# Patient Record
Sex: FEMALE | Race: WHITE | Employment: UNEMPLOYED | URBAN - NONMETROPOLITAN AREA
[De-identification: names, ages, dates, MRNs, and addresses within clinical notes are randomized per-mention and may not be internally consistent; named-entity substitution may affect disease eponyms.]

---

## 2017-05-19 ENCOUNTER — HOSPITAL ENCOUNTER (EMERGENCY)
Age: 42
Discharge: HOME | End: 2017-05-19
Attending: EMERGENCY MEDICINE
Payer: COMMERCIAL

## 2017-05-19 VITALS
RESPIRATION RATE: 20 BRPM | DIASTOLIC BLOOD PRESSURE: 61 MMHG | SYSTOLIC BLOOD PRESSURE: 155 MMHG | OXYGEN SATURATION: 96 % | HEART RATE: 86 BPM | TEMPERATURE: 98.1 F

## 2017-05-19 DIAGNOSIS — J45.901 ASTHMA ATTACK: Primary | ICD-10-CM

## 2017-05-19 PROCEDURE — 6370000000 HC RX 637 (ALT 250 FOR IP)

## 2017-05-19 PROCEDURE — 99284 EMERGENCY DEPT VISIT MOD MDM: CPT

## 2017-05-19 PROCEDURE — 6360000002 HC RX W HCPCS: Performed by: EMERGENCY MEDICINE

## 2017-05-19 PROCEDURE — 94664 DEMO&/EVAL PT USE INHALER: CPT

## 2017-05-19 RX ORDER — ALBUTEROL SULFATE 2.5 MG/3ML
2.5 SOLUTION RESPIRATORY (INHALATION) ONCE
Status: COMPLETED | OUTPATIENT
Start: 2017-05-19 | End: 2017-05-19

## 2017-05-19 RX ORDER — ALBUTEROL SULFATE 90 UG/1
2 AEROSOL, METERED RESPIRATORY (INHALATION) EVERY 6 HOURS PRN
Qty: 1 INHALER | Refills: 0 | Status: SHIPPED | OUTPATIENT
Start: 2017-05-19

## 2017-05-19 RX ORDER — ALBUTEROL SULFATE 90 UG/1
2 AEROSOL, METERED RESPIRATORY (INHALATION) ONCE
Status: COMPLETED | OUTPATIENT
Start: 2017-05-19 | End: 2017-05-19

## 2017-05-19 RX ORDER — ALBUTEROL SULFATE 90 UG/1
2 AEROSOL, METERED RESPIRATORY (INHALATION) EVERY 6 HOURS PRN
COMMUNITY
End: 2017-05-19 | Stop reason: HOSPADM

## 2017-05-19 RX ORDER — VALACYCLOVIR HYDROCHLORIDE 500 MG/1
500 TABLET, FILM COATED ORAL DAILY
COMMUNITY

## 2017-05-19 RX ORDER — INSULIN GLARGINE 100 [IU]/ML
40 INJECTION, SOLUTION SUBCUTANEOUS NIGHTLY
COMMUNITY
End: 2018-09-11 | Stop reason: ALTCHOICE

## 2017-05-19 RX ORDER — ALBUTEROL SULFATE 90 UG/1
AEROSOL, METERED RESPIRATORY (INHALATION)
Status: COMPLETED
Start: 2017-05-19 | End: 2017-05-19

## 2017-05-19 RX ADMIN — ALBUTEROL SULFATE 2 PUFF: 90 AEROSOL, METERED RESPIRATORY (INHALATION) at 23:14

## 2017-05-19 RX ADMIN — ALBUTEROL SULFATE 2.5 MG: 2.5 SOLUTION RESPIRATORY (INHALATION) at 22:27

## 2017-05-19 ASSESSMENT — ENCOUNTER SYMPTOMS
ABDOMINAL PAIN: 0
COUGH: 0
CHEST TIGHTNESS: 0
APNEA: 0
SWOLLEN GLANDS: 0
SPUTUM PRODUCTION: 0
HEMOPTYSIS: 0
SHORTNESS OF BREATH: 1
EYES NEGATIVE: 1
VOMITING: 0
SORE THROAT: 0
ALLERGIC/IMMUNOLOGIC NEGATIVE: 1
GASTROINTESTINAL NEGATIVE: 1
CHOKING: 0
WHEEZING: 1

## 2017-06-02 ENCOUNTER — HOSPITAL ENCOUNTER (EMERGENCY)
Age: 42
Discharge: HOME | End: 2017-06-02
Attending: EMERGENCY MEDICINE
Payer: COMMERCIAL

## 2017-06-02 VITALS
TEMPERATURE: 97.6 F | SYSTOLIC BLOOD PRESSURE: 158 MMHG | RESPIRATION RATE: 20 BRPM | OXYGEN SATURATION: 95 % | HEART RATE: 95 BPM | DIASTOLIC BLOOD PRESSURE: 92 MMHG

## 2017-06-02 DIAGNOSIS — T17.1XXA NASAL FOREIGN BODY, INITIAL ENCOUNTER: ICD-10-CM

## 2017-06-02 DIAGNOSIS — L73.9 FOLLICULITIS: Primary | ICD-10-CM

## 2017-06-02 PROCEDURE — 99283 EMERGENCY DEPT VISIT LOW MDM: CPT

## 2017-06-02 RX ORDER — SULFAMETHOXAZOLE AND TRIMETHOPRIM 800; 160 MG/1; MG/1
1 TABLET ORAL 2 TIMES DAILY
Qty: 14 TABLET | Refills: 0 | Status: SHIPPED | OUTPATIENT
Start: 2017-06-02 | End: 2017-06-12

## 2017-06-02 RX ORDER — ALBUTEROL SULFATE 90 UG/1
2 AEROSOL, METERED RESPIRATORY (INHALATION) EVERY 6 HOURS PRN
Status: DISCONTINUED | OUTPATIENT
Start: 2017-06-02 | End: 2017-06-03 | Stop reason: HOSPADM

## 2018-09-11 ENCOUNTER — APPOINTMENT (OUTPATIENT)
Dept: CT IMAGING | Age: 43
End: 2018-09-11
Payer: COMMERCIAL

## 2018-09-11 ENCOUNTER — HOSPITAL ENCOUNTER (EMERGENCY)
Age: 43
Discharge: HOME | End: 2018-09-11
Attending: EMERGENCY MEDICINE
Payer: COMMERCIAL

## 2018-09-11 VITALS
DIASTOLIC BLOOD PRESSURE: 78 MMHG | HEART RATE: 70 BPM | WEIGHT: 260 LBS | OXYGEN SATURATION: 97 % | TEMPERATURE: 97.8 F | SYSTOLIC BLOOD PRESSURE: 172 MMHG | BODY MASS INDEX: 41.97 KG/M2 | RESPIRATION RATE: 16 BRPM

## 2018-09-11 DIAGNOSIS — N39.0 URINARY TRACT INFECTION WITHOUT HEMATURIA, SITE UNSPECIFIED: ICD-10-CM

## 2018-09-11 DIAGNOSIS — R10.9 ABDOMINAL PAIN, UNSPECIFIED ABDOMINAL LOCATION: Primary | ICD-10-CM

## 2018-09-11 LAB
-: NORMAL
ABSOLUTE EOS #: 0.3 K/UL (ref 0–0.4)
ABSOLUTE IMMATURE GRANULOCYTE: ABNORMAL K/UL (ref 0–0.3)
ABSOLUTE LYMPH #: 3.8 K/UL (ref 1–4.8)
ABSOLUTE MONO #: 0.8 K/UL (ref 0–1)
ALBUMIN SERPL-MCNC: 3.7 G/DL (ref 3.5–5.2)
ALBUMIN/GLOBULIN RATIO: ABNORMAL (ref 1–2.5)
ALP BLD-CCNC: 64 U/L (ref 35–104)
ALT SERPL-CCNC: 22 U/L (ref 5–33)
AMORPHOUS: NORMAL
ANION GAP SERPL CALCULATED.3IONS-SCNC: 16 MMOL/L (ref 9–17)
AST SERPL-CCNC: 13 U/L
BACTERIA: NORMAL
BASOPHILS # BLD: 1 % (ref 0–2)
BASOPHILS ABSOLUTE: 0.1 K/UL (ref 0–0.2)
BILIRUB SERPL-MCNC: 0.2 MG/DL (ref 0.3–1.2)
BILIRUBIN URINE: ABNORMAL
BUN BLDV-MCNC: 13 MG/DL (ref 6–20)
BUN/CREAT BLD: 23 (ref 9–20)
CALCIUM SERPL-MCNC: 9 MG/DL (ref 8.6–10.4)
CASTS UA: NORMAL /LPF
CHLORIDE BLD-SCNC: 99 MMOL/L (ref 98–107)
CO2: 20 MMOL/L (ref 20–31)
COLOR: YELLOW
COMMENT UA: ABNORMAL
CREAT SERPL-MCNC: 0.56 MG/DL (ref 0.5–0.9)
CRYSTALS, UA: NORMAL /HPF
DIFFERENTIAL TYPE: YES
EKG ATRIAL RATE: 74 BPM
EKG P AXIS: 59 DEGREES
EKG P-R INTERVAL: 144 MS
EKG Q-T INTERVAL: 394 MS
EKG QRS DURATION: 84 MS
EKG QTC CALCULATION (BAZETT): 437 MS
EKG R AXIS: 74 DEGREES
EKG T AXIS: 49 DEGREES
EKG VENTRICULAR RATE: 74 BPM
EOSINOPHILS RELATIVE PERCENT: 2 % (ref 0–5)
EPITHELIAL CELLS UA: NORMAL /HPF
GFR AFRICAN AMERICAN: >60 ML/MIN
GFR NON-AFRICAN AMERICAN: >60 ML/MIN
GFR SERPL CREATININE-BSD FRML MDRD: ABNORMAL ML/MIN/{1.73_M2}
GFR SERPL CREATININE-BSD FRML MDRD: ABNORMAL ML/MIN/{1.73_M2}
GLUCOSE BLD-MCNC: 227 MG/DL (ref 70–99)
GLUCOSE URINE: ABNORMAL
HCT VFR BLD CALC: 53.2 % (ref 36–46)
HEMOGLOBIN: 18 G/DL (ref 12–16)
IMMATURE GRANULOCYTES: ABNORMAL %
KETONES, URINE: ABNORMAL
LACTIC ACID, WHOLE BLOOD: ABNORMAL MMOL/L (ref 0.7–2.1)
LACTIC ACID: 2.7 MMOL/L (ref 0.5–2.2)
LEUKOCYTE ESTERASE, URINE: ABNORMAL
LIPASE: 56 U/L (ref 13–60)
LYMPHOCYTES # BLD: 25 % (ref 15–40)
MCH RBC QN AUTO: 32.6 PG (ref 26–34)
MCHC RBC AUTO-ENTMCNC: 33.8 G/DL (ref 31–37)
MCV RBC AUTO: 96.5 FL (ref 80–100)
MONOCYTES # BLD: 5 % (ref 4–8)
MUCUS: NORMAL
NITRITE, URINE: NEGATIVE
NRBC AUTOMATED: ABNORMAL PER 100 WBC
OTHER OBSERVATIONS UA: NORMAL
PDW BLD-RTO: 13.9 % (ref 12.1–15.2)
PH UA: 5 (ref 5–8)
PLATELET # BLD: 222 K/UL (ref 140–450)
PLATELET ESTIMATE: ABNORMAL
PMV BLD AUTO: ABNORMAL FL (ref 6–12)
POTASSIUM SERPL-SCNC: 4 MMOL/L (ref 3.7–5.3)
PROTEIN UA: ABNORMAL
RBC # BLD: 5.51 M/UL (ref 4–5.2)
RBC # BLD: ABNORMAL 10*6/UL
RBC UA: NORMAL /HPF (ref 0–2)
RENAL EPITHELIAL, UA: NORMAL /HPF
SEG NEUTROPHILS: 67 % (ref 47–75)
SEGMENTED NEUTROPHILS ABSOLUTE COUNT: 10 K/UL (ref 2.5–7)
SODIUM BLD-SCNC: 135 MMOL/L (ref 135–144)
SPECIFIC GRAVITY UA: 1.02 (ref 1–1.03)
TOTAL PROTEIN: 7 G/DL (ref 6.4–8.3)
TRICHOMONAS: NORMAL
TROPONIN INTERP: NORMAL
TROPONIN T: <0.03 NG/ML
TURBIDITY: CLEAR
URINE HGB: ABNORMAL
UROBILINOGEN, URINE: ABNORMAL
WBC # BLD: 15 K/UL (ref 3.5–11)
WBC # BLD: ABNORMAL 10*3/UL
WBC UA: NORMAL /HPF
YEAST: NORMAL

## 2018-09-11 PROCEDURE — 99284 EMERGENCY DEPT VISIT MOD MDM: CPT

## 2018-09-11 PROCEDURE — 83690 ASSAY OF LIPASE: CPT

## 2018-09-11 PROCEDURE — 6360000004 HC RX CONTRAST MEDICATION: Performed by: EMERGENCY MEDICINE

## 2018-09-11 PROCEDURE — 81001 URINALYSIS AUTO W/SCOPE: CPT

## 2018-09-11 PROCEDURE — 6360000002 HC RX W HCPCS: Performed by: EMERGENCY MEDICINE

## 2018-09-11 PROCEDURE — 83605 ASSAY OF LACTIC ACID: CPT

## 2018-09-11 PROCEDURE — 87086 URINE CULTURE/COLONY COUNT: CPT

## 2018-09-11 PROCEDURE — 93005 ELECTROCARDIOGRAM TRACING: CPT

## 2018-09-11 PROCEDURE — 6370000000 HC RX 637 (ALT 250 FOR IP): Performed by: EMERGENCY MEDICINE

## 2018-09-11 PROCEDURE — 85025 COMPLETE CBC W/AUTO DIFF WBC: CPT

## 2018-09-11 PROCEDURE — 96372 THER/PROPH/DIAG INJ SC/IM: CPT

## 2018-09-11 PROCEDURE — 80053 COMPREHEN METABOLIC PANEL: CPT

## 2018-09-11 PROCEDURE — 74177 CT ABD & PELVIS W/CONTRAST: CPT

## 2018-09-11 PROCEDURE — 84484 ASSAY OF TROPONIN QUANT: CPT

## 2018-09-11 PROCEDURE — 36415 COLL VENOUS BLD VENIPUNCTURE: CPT

## 2018-09-11 RX ORDER — FLUCONAZOLE 100 MG/1
100 TABLET ORAL DAILY
Qty: 5 TABLET | Refills: 0 | Status: SHIPPED | OUTPATIENT
Start: 2018-09-11 | End: 2018-09-16

## 2018-09-11 RX ORDER — NITROFURANTOIN 25; 75 MG/1; MG/1
100 CAPSULE ORAL ONCE
Status: COMPLETED | OUTPATIENT
Start: 2018-09-11 | End: 2018-09-11

## 2018-09-11 RX ORDER — NALBUPHINE HCL 10 MG/ML
20 AMPUL (ML) INJECTION ONCE
Status: COMPLETED | OUTPATIENT
Start: 2018-09-11 | End: 2018-09-11

## 2018-09-11 RX ORDER — NITROFURANTOIN 25; 75 MG/1; MG/1
100 CAPSULE ORAL 2 TIMES DAILY
Qty: 14 CAPSULE | Refills: 0 | Status: SHIPPED | OUTPATIENT
Start: 2018-09-11 | End: 2018-09-18

## 2018-09-11 RX ADMIN — NALBUPHINE HYDROCHLORIDE 20 MG: 10 INJECTION, SOLUTION INTRAMUSCULAR; INTRAVENOUS; SUBCUTANEOUS at 21:16

## 2018-09-11 RX ADMIN — NITROFURANTOIN MONOHYDRATE/MACROCRYSTALLINE 100 MG: 25; 75 CAPSULE ORAL at 23:40

## 2018-09-11 RX ADMIN — IOPAMIDOL 75 ML: 755 INJECTION, SOLUTION INTRAVENOUS at 21:40

## 2018-09-11 ASSESSMENT — PAIN SCALES - GENERAL
PAINLEVEL_OUTOF10: 7
PAINLEVEL_OUTOF10: 7

## 2018-09-11 ASSESSMENT — PAIN DESCRIPTION - FREQUENCY: FREQUENCY: CONTINUOUS

## 2018-09-11 ASSESSMENT — PAIN DESCRIPTION - ONSET: ONSET: ON-GOING

## 2018-09-11 ASSESSMENT — PAIN DESCRIPTION - PAIN TYPE: TYPE: ACUTE PAIN

## 2018-09-11 ASSESSMENT — PAIN DESCRIPTION - DESCRIPTORS: DESCRIPTORS: ACHING

## 2018-09-11 ASSESSMENT — PAIN DESCRIPTION - ORIENTATION: ORIENTATION: RIGHT;LEFT

## 2018-09-11 ASSESSMENT — PAIN DESCRIPTION - LOCATION: LOCATION: ABDOMEN

## 2018-09-12 LAB
CULTURE: NORMAL
Lab: NORMAL
SPECIMEN DESCRIPTION: NORMAL
STATUS: NORMAL

## 2018-09-12 NOTE — ED PROVIDER NOTES
eMERGENCY dEPARTMENT eNCOUnter      [facility]    Chief Complaint   Patient presents with    Flank Pain     Pt states this all started today     Urinary Tract Infection       HPI    Stlela Mitchell is a 43 y.o. female who presents to ED from home. By car. With complaint of Epigastric abdominal pain that radiates to the back. Onset after lunch today. Intensity of symptoms mother and  Location of symptoms epigastric pain with radiation to her back. Patient complains of nausea no vomiting. No diarrhea  Patient denies alcohol use. Patient has periods history of  cholecystectomy.     REVIEW OF SYSTEMS    All systems reviewed and positives are in the HPI    PAST MEDICAL HISTORY    Past Medical History:   Diagnosis Date    Acid reflux     Asthma     Bipolar 1 disorder ([facility] [address]     Brunner's gland hyperplasia of duodenum     Depression     Diabetes mellitus ([facility] [address]     States \"diabetes is controlled\"    DUB (dysfunctional uterine bleeding)     Gastritis and duodenitis     Headache(784.0)     Hiatal hernia     HIGH CHOLESTEROL     Hypertension     MRSA (methicillin resistant staph aureus) culture positive     Neuropathy     Neuropathy     from DM bilat UE and LE     Obesity     Polycystic ovarian disease     Restless legs syndrome     Seizures (HCC)     Last seizure june 2014    Sleep apnea     Unspecified sleep apnea        SURGICAL HISTORY    Past Surgical History:   Procedure Laterality Date    CHOLECYSTECTOMY      COLONOSCOPY  07-    DILATION AND CURETTAGE OF UTERUS      TONSILLECTOMY AND ADENOIDECTOMY      UPPER GASTROINTESTINAL ENDOSCOPY  07-    UPPER GASTROINTESTINAL ENDOSCOPY  09-11-13       CURRENT MEDICATIONS    Current Outpatient Rx   Medication Sig Dispense Refill    nitrofurantoin, macrocrystal-monohydrate, (MACROBID) 100 MG capsule Take 1 capsule by mouth 2 times daily for 7 days 14 capsule 0    fluconazole (DIFLUCAN) 100 MG tablet Take 1 tablet by mouth this visit:    Medications   nalbuphine (NUBAIN) injection 20 mg (20 mg Intramuscular Given 9/11/18 2116)   iopamidol (ISOVUE-370) 76 % injection 75 mL (75 mLs Intravenous Given 9/11/18 2140)   nitrofurantoin (macrocrystal-monohydrate) (MACROBID) capsule 100 mg (100 mg Oral Given 9/11/18 2340)       New Prescriptions from this visit:    Discharge Medication List as of 9/11/2018 11:36 PM      START taking these medications    Details   nitrofurantoin, macrocrystal-monohydrate, (MACROBID) 100 MG capsule Take 1 capsule by mouth 2 times daily for 7 days, Disp-14 capsule, R-0Print             Follow-up:  [facility] ED  [address] [address] [phone]    As needed, If symptoms worsen        Final Impression:   1. Abdominal pain, unspecified abdominal location    2.  Urinary tract infection without hematuria, site unspecified               (Please note that portions of this note were completed with a voice recognition program.  Efforts were made to edit the dictations but occasionally words are mis-transcribed.)      (Please note that portions of this note were completed with a voice recognition program.  Efforts were made to edit the dictations but occasionally words are mis-transcribed.)      Demetrice Bearden MD  09/12/18 0281

## 2018-10-20 ENCOUNTER — APPOINTMENT (OUTPATIENT)
Dept: GENERAL RADIOLOGY | Age: 43
End: 2018-10-20
Payer: COMMERCIAL

## 2018-10-20 ENCOUNTER — HOSPITAL ENCOUNTER (EMERGENCY)
Age: 43
Discharge: HOME | End: 2018-10-20
Attending: EMERGENCY MEDICINE
Payer: COMMERCIAL

## 2018-10-20 VITALS
OXYGEN SATURATION: 93 % | RESPIRATION RATE: 18 BRPM | HEIGHT: 65 IN | SYSTOLIC BLOOD PRESSURE: 111 MMHG | DIASTOLIC BLOOD PRESSURE: 62 MMHG | WEIGHT: 274 LBS | TEMPERATURE: 99 F | HEART RATE: 82 BPM | BODY MASS INDEX: 45.65 KG/M2

## 2018-10-20 DIAGNOSIS — S93.401A SPRAIN OF RIGHT ANKLE, UNSPECIFIED LIGAMENT, INITIAL ENCOUNTER: Primary | ICD-10-CM

## 2018-10-20 PROCEDURE — 6370000000 HC RX 637 (ALT 250 FOR IP): Performed by: EMERGENCY MEDICINE

## 2018-10-20 PROCEDURE — 99283 EMERGENCY DEPT VISIT LOW MDM: CPT

## 2018-10-20 PROCEDURE — 73590 X-RAY EXAM OF LOWER LEG: CPT

## 2018-10-20 PROCEDURE — 73610 X-RAY EXAM OF ANKLE: CPT

## 2018-10-20 RX ORDER — IBUPROFEN 200 MG
800 TABLET ORAL ONCE
Status: COMPLETED | OUTPATIENT
Start: 2018-10-20 | End: 2018-10-20

## 2018-10-20 RX ADMIN — IBUPROFEN 800 MG: 200 TABLET, FILM COATED ORAL at 21:52

## 2018-10-20 ASSESSMENT — PAIN DESCRIPTION - ORIENTATION: ORIENTATION: RIGHT

## 2018-10-20 ASSESSMENT — PAIN DESCRIPTION - LOCATION: LOCATION: ANKLE;FOOT

## 2018-10-20 ASSESSMENT — PAIN DESCRIPTION - PAIN TYPE: TYPE: ACUTE PAIN

## 2018-10-20 ASSESSMENT — PAIN SCALES - GENERAL
PAINLEVEL_OUTOF10: 5
PAINLEVEL_OUTOF10: 2
PAINLEVEL_OUTOF10: 5

## 2023-04-04 LAB
CALCIDIOL (25 OH VITAMIN D3) (NG/ML) IN SER/PLAS: 46 NG/ML
COBALAMIN (VITAMIN B12) (PG/ML) IN SER/PLAS: 187 PG/ML (ref 211–911)
ESTIMATED AVERAGE GLUCOSE FOR HBA1C: 123 MG/DL
HEMOGLOBIN A1C/HEMOGLOBIN TOTAL IN BLOOD: 5.9 %
THYROTROPIN (MIU/L) IN SER/PLAS BY DETECTION LIMIT <= 0.05 MIU/L: 1.02 MIU/L (ref 0.44–3.98)

## 2023-06-20 LAB
ANION GAP IN SER/PLAS: 13 MMOL/L (ref 10–20)
ANION GAP SERPL CALCULATED.3IONS-SCNC: 13 MMOL/L (ref 10–20)
BICARBONATE: 20 MMOL/L (ref 21–32)
CALCIUM (MG/DL) IN SER/PLAS: 9.2 MG/DL (ref 8.6–10.3)
CALCIUM SERPL-MCNC: 9.2 MG/DL (ref 8.6–10.3)
CARBON DIOXIDE, TOTAL (MMOL/L) IN SER/PLAS: 20 MMOL/L (ref 21–32)
CHLORIDE (MMOL/L) IN SER/PLAS: 107 MMOL/L (ref 98–107)
CHLORIDE BLD-SCNC: 107 MMOL/L (ref 98–107)
CREAT SERPL-MCNC: 0.9 MG/DL (ref 0.5–1.05)
CREATININE (MG/DL) IN SER/PLAS: 0.9 MG/DL (ref 0.5–1.05)
EGFR FEMALE: 79 ML/MIN/1.73M2
ERYTHROCYTE DISTRIBUTION WIDTH (RATIO) BY AUTOMATED COUNT: 12.9 % (ref 11.5–14.5)
ERYTHROCYTE MEAN CORPUSCULAR HEMOGLOBIN CONCENTRATION (G/DL) BY AUTOMATED: 34.1 G/DL (ref 32–36)
ERYTHROCYTE MEAN CORPUSCULAR VOLUME (FL) BY AUTOMATED COUNT: 102 FL (ref 80–100)
ERYTHROCYTE [DISTWIDTH] IN BLOOD BY AUTOMATED COUNT: 12.9 % (ref 11.5–14.5)
ERYTHROCYTES (10*6/UL) IN BLOOD BY AUTOMATED COUNT: 4.93 X10E12/L (ref 4–5.2)
GFR FEMALE: 79 ML/MIN/1.73M2
GLUCOSE (MG/DL) IN SER/PLAS: 98 MG/DL (ref 74–99)
GLUCOSE: 98 MG/DL (ref 74–99)
HCT VFR BLD CALC: 50.2 % (ref 36–46)
HEMATOCRIT (%) IN BLOOD BY AUTOMATED COUNT: 50.2 % (ref 36–46)
HEMOGLOBIN (G/DL) IN BLOOD: 17.1 G/DL (ref 12–16)
HEMOGLOBIN: 17.1 G/DL (ref 12–16)
INR BLD: 1 (ref 0.9–1.1)
INR IN PPP BY COAGULATION ASSAY: 1 (ref 0.9–1.1)
LEUKOCYTES (10*3/UL) IN BLOOD BY AUTOMATED COUNT: 11.9 X10E9/L (ref 4.4–11.3)
MCHC RBC AUTO-ENTMCNC: 34.1 G/DL (ref 32–36)
MCV RBC AUTO: 102 FL (ref 80–100)
PLATELET # BLD: 217 X10E9/L (ref 150–450)
PLATELETS (10*3/UL) IN BLOOD AUTOMATED COUNT: 217 X10E9/L (ref 150–450)
POTASSIUM (MMOL/L) IN SER/PLAS: 3.9 MMOL/L (ref 3.5–5.3)
POTASSIUM SERPL-SCNC: 3.9 MMOL/L (ref 3.5–5.3)
PROTHROMBIN TIME (PT) IN PPP BY COAGULATION ASSAY: 10.9 SEC (ref 9.8–12.8)
PROTHROMBIN TIME: 10.9 SEC (ref 9.8–12.8)
RBC # BLD: 4.93 X10E12/L (ref 4–5.2)
SODIUM (MMOL/L) IN SER/PLAS: 136 MMOL/L (ref 136–145)
SODIUM BLD-SCNC: 136 MMOL/L (ref 136–145)
UREA NITROGEN (MG/DL) IN SER/PLAS: 21 MG/DL (ref 6–23)
UREA NITROGEN: 21 MG/DL (ref 6–23)
WBC: 11.9 X10E9/L (ref 4.4–11.3)

## 2023-09-21 PROBLEM — E78.5 HYPERLIPIDEMIA LDL GOAL <100: Status: ACTIVE | Noted: 2023-09-21

## 2023-09-21 PROBLEM — E11.9 DIABETES MELLITUS TYPE II, NON INSULIN DEPENDENT (MULTI): Status: ACTIVE | Noted: 2023-09-21

## 2023-09-21 PROBLEM — I49.5 SINUS NODE DYSFUNCTION (MULTI): Status: ACTIVE | Noted: 2023-09-21

## 2023-09-21 PROBLEM — E04.2 MULTINODULAR GOITER (NONTOXIC): Status: ACTIVE | Noted: 2023-09-21

## 2023-09-21 PROBLEM — E28.2 PCOS (POLYCYSTIC OVARIAN SYNDROME): Status: ACTIVE | Noted: 2023-09-21

## 2023-09-21 PROBLEM — H90.A22 SENSORINEURAL HEARING LOSS (SNHL) OF LEFT EAR WITH RESTRICTED HEARING OF RIGHT EAR: Status: ACTIVE | Noted: 2023-09-21

## 2023-09-21 PROBLEM — E11.40 DIABETIC NEUROPATHY (MULTI): Status: ACTIVE | Noted: 2023-09-21

## 2023-09-21 PROBLEM — H90.A31 MIXED CONDUCTIVE AND SENSORINEURAL HEARING LOSS OF RIGHT EAR WITH RESTRICTED HEARING OF LEFT EAR: Status: ACTIVE | Noted: 2023-09-21

## 2023-09-21 PROBLEM — G40.909 SEIZURE DISORDER (MULTI): Status: ACTIVE | Noted: 2023-09-21

## 2023-09-21 PROBLEM — R51.9 UNILATERAL HEADACHE: Status: ACTIVE | Noted: 2023-09-21

## 2023-09-21 PROBLEM — R11.2 NAUSEA AND VOMITING: Status: ACTIVE | Noted: 2023-09-21

## 2023-09-21 PROBLEM — G62.9 NEUROPATHY: Status: ACTIVE | Noted: 2023-09-21

## 2023-09-21 PROBLEM — G95.0 SYRINGOMYELIA (MULTI): Status: ACTIVE | Noted: 2023-09-21

## 2023-09-21 PROBLEM — E55.9 VITAMIN D DEFICIENCY: Status: ACTIVE | Noted: 2023-09-21

## 2023-09-21 PROBLEM — R00.1 BRADYCARDIA: Status: ACTIVE | Noted: 2023-09-21

## 2023-09-21 PROBLEM — E66.9 OBESITY: Status: ACTIVE | Noted: 2023-09-21

## 2023-09-21 PROBLEM — E87.6 HYPOKALEMIA: Status: ACTIVE | Noted: 2023-09-21

## 2023-09-21 PROBLEM — R55 SYNCOPE AND COLLAPSE: Status: ACTIVE | Noted: 2023-09-21

## 2023-09-21 PROBLEM — I10 BENIGN ESSENTIAL HYPERTENSION: Status: ACTIVE | Noted: 2023-09-21

## 2023-09-21 PROBLEM — H65.91 FLUID LEVEL BEHIND TYMPANIC MEMBRANE OF RIGHT EAR: Status: ACTIVE | Noted: 2023-09-21

## 2023-09-21 PROBLEM — Z86.39 HISTORY OF THYROID NODULE: Status: ACTIVE | Noted: 2023-09-21

## 2023-09-21 PROBLEM — E53.8 VITAMIN B12 DEFICIENCY: Status: ACTIVE | Noted: 2023-09-21

## 2023-09-21 PROBLEM — G93.5 CHIARI MALFORMATION TYPE I (MULTI): Status: ACTIVE | Noted: 2023-09-21

## 2023-09-21 PROBLEM — E24.9 HYPERCORTISOLISM (MULTI): Status: ACTIVE | Noted: 2023-09-21

## 2023-09-21 RX ORDER — ATORVASTATIN CALCIUM 40 MG/1
1 TABLET, FILM COATED ORAL NIGHTLY
COMMUNITY
Start: 2019-09-24 | End: 2023-11-08

## 2023-09-21 RX ORDER — TOPIRAMATE 25 MG/1
100 TABLET ORAL DAILY
COMMUNITY
End: 2024-05-08 | Stop reason: SDUPTHER

## 2023-09-21 RX ORDER — MONTELUKAST SODIUM 10 MG/1
10 TABLET ORAL NIGHTLY
COMMUNITY
Start: 2020-07-08

## 2023-09-21 RX ORDER — DULAGLUTIDE 1.5 MG/.5ML
1.5 INJECTION, SOLUTION SUBCUTANEOUS
COMMUNITY
Start: 2021-12-07 | End: 2023-10-31 | Stop reason: SDUPTHER

## 2023-09-21 RX ORDER — ALBUTEROL SULFATE 90 UG/1
2 AEROSOL, METERED RESPIRATORY (INHALATION) EVERY 6 HOURS PRN
COMMUNITY
Start: 2017-02-24

## 2023-09-21 RX ORDER — BLOOD-GLUCOSE METER
EACH MISCELLANEOUS
COMMUNITY
Start: 2021-09-28

## 2023-09-21 RX ORDER — TRAMADOL HYDROCHLORIDE 50 MG/1
1 TABLET ORAL 3 TIMES DAILY
COMMUNITY
Start: 2021-12-07 | End: 2024-01-17 | Stop reason: WASHOUT

## 2023-09-21 RX ORDER — LISINOPRIL 5 MG/1
1 TABLET ORAL DAILY
COMMUNITY
Start: 2019-11-07 | End: 2023-11-14

## 2023-09-21 RX ORDER — PANTOPRAZOLE SODIUM 40 MG/1
1 TABLET, DELAYED RELEASE ORAL 2 TIMES DAILY
COMMUNITY

## 2023-09-21 RX ORDER — ONDANSETRON HYDROCHLORIDE 8 MG/1
8 TABLET, FILM COATED ORAL EVERY 8 HOURS PRN
COMMUNITY
Start: 2020-03-02

## 2023-09-21 RX ORDER — GABAPENTIN 600 MG/1
600 TABLET ORAL 3 TIMES DAILY
COMMUNITY

## 2023-09-21 RX ORDER — CHOLECALCIFEROL (VITAMIN D3) 125 MCG
1 CAPSULE ORAL
COMMUNITY

## 2023-09-21 RX ORDER — FENOFIBRATE 145 MG/1
1 TABLET, FILM COATED ORAL DAILY
COMMUNITY
Start: 2021-05-01 | End: 2023-10-20 | Stop reason: ALTCHOICE

## 2023-09-21 RX ORDER — INSULIN LISPRO 100 [IU]/ML
1 INJECTION, SOLUTION INTRAVENOUS; SUBCUTANEOUS
COMMUNITY
Start: 2020-06-01 | End: 2023-10-31 | Stop reason: SDUPTHER

## 2023-09-21 RX ORDER — TIOTROPIUM BROMIDE INHALATION SPRAY 3.12 UG/1
2 SPRAY, METERED RESPIRATORY (INHALATION) DAILY
COMMUNITY
Start: 2017-08-31 | End: 2023-10-20 | Stop reason: ALTCHOICE

## 2023-09-21 RX ORDER — ASPIRIN 81 MG/1
1 TABLET ORAL DAILY
COMMUNITY
Start: 2020-07-08 | End: 2024-01-17 | Stop reason: WASHOUT

## 2023-09-21 RX ORDER — CYCLOBENZAPRINE HCL 10 MG
10 TABLET ORAL 3 TIMES DAILY PRN
COMMUNITY
End: 2024-01-17 | Stop reason: WASHOUT

## 2023-10-06 ENCOUNTER — TELEPHONE (OUTPATIENT)
Dept: CARDIOLOGY | Facility: CLINIC | Age: 48
End: 2023-10-06
Payer: COMMERCIAL

## 2023-10-20 ENCOUNTER — OFFICE VISIT (OUTPATIENT)
Dept: CARDIOLOGY | Facility: CLINIC | Age: 48
End: 2023-10-20
Payer: COMMERCIAL

## 2023-10-20 ENCOUNTER — HOSPITAL ENCOUNTER (OUTPATIENT)
Facility: HOSPITAL | Age: 48
Setting detail: OUTPATIENT SURGERY
End: 2023-10-20
Attending: INTERNAL MEDICINE | Admitting: INTERNAL MEDICINE
Payer: COMMERCIAL

## 2023-10-20 VITALS
SYSTOLIC BLOOD PRESSURE: 116 MMHG | DIASTOLIC BLOOD PRESSURE: 64 MMHG | HEART RATE: 65 BPM | BODY MASS INDEX: 34.98 KG/M2 | WEIGHT: 207 LBS

## 2023-10-20 DIAGNOSIS — R55 SYNCOPE AND COLLAPSE: Primary | ICD-10-CM

## 2023-10-20 DIAGNOSIS — F17.200 CURRENT EVERY DAY SMOKER: ICD-10-CM

## 2023-10-20 DIAGNOSIS — Z01.818 PRE-OPERATIVE CLEARANCE: ICD-10-CM

## 2023-10-20 DIAGNOSIS — R00.1 BRADYCARDIA: ICD-10-CM

## 2023-10-20 DIAGNOSIS — E78.5 HYPERLIPIDEMIA LDL GOAL <100: ICD-10-CM

## 2023-10-20 DIAGNOSIS — R55 SYNCOPE AND COLLAPSE: ICD-10-CM

## 2023-10-20 DIAGNOSIS — I49.5 SINUS NODE DYSFUNCTION (MULTI): ICD-10-CM

## 2023-10-20 DIAGNOSIS — I10 BENIGN ESSENTIAL HYPERTENSION: ICD-10-CM

## 2023-10-20 PROCEDURE — 3044F HG A1C LEVEL LT 7.0%: CPT | Performed by: INTERNAL MEDICINE

## 2023-10-20 PROCEDURE — 3048F LDL-C <100 MG/DL: CPT | Performed by: INTERNAL MEDICINE

## 2023-10-20 PROCEDURE — 3008F BODY MASS INDEX DOCD: CPT | Performed by: INTERNAL MEDICINE

## 2023-10-20 PROCEDURE — 4010F ACE/ARB THERAPY RXD/TAKEN: CPT | Performed by: INTERNAL MEDICINE

## 2023-10-20 PROCEDURE — 3060F POS MICROALBUMINURIA REV: CPT | Performed by: INTERNAL MEDICINE

## 2023-10-20 PROCEDURE — 3074F SYST BP LT 130 MM HG: CPT | Performed by: INTERNAL MEDICINE

## 2023-10-20 PROCEDURE — 93000 ELECTROCARDIOGRAM COMPLETE: CPT | Performed by: INTERNAL MEDICINE

## 2023-10-20 PROCEDURE — 3078F DIAST BP <80 MM HG: CPT | Performed by: INTERNAL MEDICINE

## 2023-10-20 PROCEDURE — 99214 OFFICE O/P EST MOD 30 MIN: CPT | Performed by: INTERNAL MEDICINE

## 2023-10-20 RX ORDER — VALACYCLOVIR HYDROCHLORIDE 500 MG/1
500 TABLET, FILM COATED ORAL DAILY
COMMUNITY

## 2023-10-20 RX ORDER — LAMOTRIGINE 25 MG/1
50 TABLET ORAL DAILY
COMMUNITY
End: 2024-01-17 | Stop reason: WASHOUT

## 2023-10-20 NOTE — PROGRESS NOTES
CARDIOLOGY OFFICE VISIT      CHIEF COMPLAINT      HISTORY OF PRESENT ILLNESS  The patient is being seen today as a new patient visit for preoperative cardiac evaluation prior to undergoing left knee replacement surgery in [place].  The patient has been seeing Dr. Monson and EP.  He plans to have a loop recorder performed.  please see his office small from June 20, 2023 for complete details.  She is not having any chest discomfort recently.  She denies any significant dyspnea.  She denies palpitations and syncope.  She does have a past history of syncope.She also has a history of nonsustained ventricular tachycardia.  Therefore she has been to have loop recorder implanted.  She has not had any problems in the past with surgeries as far as an anesthesia or cardiac standpoint.  She states she is going to be evaluated in near future by her vascular doctor at [facility].  She states she does have some narrowing of what sounds like her left iliac artery.    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  The patient is a satisfactory risk for her upcoming needed knee surgery assuming routine preop lab is satisfactory.  The patient will hold her aspirin for 1 week prior to procedure.      Past Medical History  Past Medical History:   Diagnosis Date    Other disorders of lung     Lung trouble    Personal history of other diseases of the digestive system     History of gastroesophageal reflux (GERD)    Personal history of other diseases of the  musculoskeletal system and connective tissue     History of arthritis    Personal history of other diseases of the nervous system and sense organs     History of sleep apnea    Personal history of other diseases of the respiratory system     History of chronic obstructive lung disease    Personal history of other diseases of the respiratory system     History of bronchitis    Personal history of other diseases of the respiratory system     History of asthma    Personal history of other endocrine, nutritional and metabolic disease     History of diabetes mellitus    Personal history of other endocrine, nutritional and metabolic disease     History of thyroid disorder    Personal history of other mental and behavioral disorders     History of depression    Personal history of other mental and behavioral disorders     History of mental disorder    Personal history of other specified conditions     History of snoring       Social History  Social History     Tobacco Use    Smoking status: Every Day     Packs/day: .5     Types: Cigarettes    Smokeless tobacco: Never   Substance Use Topics    Alcohol use: Not Currently    Drug use: Yes     Types: Marijuana       Family History     Family History   Problem Relation Name Age of Onset    No Known Problems Mother      No Known Problems Father          Allergies:  Allergies   Allergen Reactions    Duloxetine Unknown    Pregabalin Unknown    Shellfish Derived Unknown        Outpatient Medications:  Current Outpatient Medications   Medication Instructions    albuterol (Ventolin HFA) 90 mcg/actuation inhaler 2 puffs, inhalation, 4 times daily    aspirin 81 mg EC tablet 1 tablet, oral, Daily    atorvastatin (Lipitor) 40 mg tablet 1 tablet, oral, Nightly    blood sugar diagnostic (OneTouch Verio test strips) strip Test blood sugars 4 times a day as directed    brivaracetam (Briviact) 100 mg tablet tablet 2 tablets, oral, 2 times daily    cholecalciferol (Vitamin D-3) 125 MCG (5000  UT) capsule 1 capsule, oral, Weekly    cyclobenzaprine (FLEXERIL) 10 mg, oral, 3 times daily PRN    gabapentin (NEURONTIN) 600 mg, oral, 3 times daily    insulin lispro (HUMALOG) 1 Units, subcutaneous, 3 times daily with meals, Sliding scale    lamoTRIgine (LAMICTAL) 50 mg, oral, Daily    lisinopril 5 mg tablet 1 tablet, oral, Daily    montelukast (SINGULAIR) 10 mg, oral, Nightly    ondansetron (ZOFRAN) 8 mg, oral, Every 8 hours PRN    pantoprazole (ProtoNix) 40 mg EC tablet 1 tablet, oral, Daily    perampaneL (FYCOMPA) 4 mg, oral, Nightly    topiramate (TOPAMAX) 25 mg, oral, 3 times daily    traMADol (Ultram) 50 mg tablet 1 tablet, oral, 3 times daily    Trulicity 1.5 mg, subcutaneous, Weekly    umeclidinium-vilanteroL (Anoro Ellipta) 62.5-25 mcg/actuation blister with device 1 puff, inhalation, Daily    valACYclovir (VALTREX) 500 mg, oral, Daily          REVIEW OF SYSTEMS  Review of Systems   All other systems reviewed and are negative.        VITALS  Vitals:    10/20/23 1123   BP: 116/64   Pulse: 65       PHYSICAL EXAM  Constitutional:       Appearance: Healthy appearance. Not in distress.   Neck:      Vascular: No JVR. JVD normal.   Pulmonary:      Effort: Pulmonary effort is normal.      Breath sounds: Normal breath sounds. No wheezing. No rhonchi. No rales.   Chest:      Chest wall: Not tender to palpatation.   Cardiovascular:      PMI at left midclavicular line. Normal rate. Regular rhythm. Normal S1. Normal S2.       Murmurs: There is no murmur.      No gallop.  No click. No rub.   Pulses:     Intact distal pulses.   Edema:     Peripheral edema absent.   Abdominal:      General: Bowel sounds are normal.      Palpations: Abdomen is soft.      Tenderness: There is no abdominal tenderness.   Musculoskeletal: Normal range of motion.         General: No tenderness. Skin:     General: Skin is warm and dry.   Neurological:      General: No focal deficit present.      Mental Status: Alert and oriented to person,  place and time.           ASSESSMENT AND PLAN      [unfilled]

## 2023-10-20 NOTE — PATIENT INSTRUCTIONS
Continue same medications/treatment.  Patient educated on proper medication use.  Patient educated on risk factor modification.  Please bring any lab results from other providers/physicians to your next appointment.    Please bring all medicines, vitamins, and herbal supplements with you when you come to the office.    Prescriptions will not be filled unless you are compliant with your follow up appointments or have a follow up appointment scheduled as per instruction of your physician. Refills should be requested at the time of your visit.    Follow with DR. Latrell Monson M.D.    Loop recorder implant    I, Jovita Beltrán RN, am scribing for and in the presence of, Dr. Alfredito Hudson MD, FACC

## 2023-10-23 NOTE — TELEPHONE ENCOUNTER
This patient is scheduled for the loop recorder to be done 11/14/23. She was here the other day and we took care of it I believe.

## 2023-10-31 ENCOUNTER — OFFICE VISIT (OUTPATIENT)
Dept: ENDOCRINOLOGY | Facility: CLINIC | Age: 48
End: 2023-10-31
Payer: COMMERCIAL

## 2023-10-31 ENCOUNTER — LAB (OUTPATIENT)
Dept: LAB | Facility: LAB | Age: 48
End: 2023-10-31
Payer: COMMERCIAL

## 2023-10-31 VITALS
DIASTOLIC BLOOD PRESSURE: 50 MMHG | HEIGHT: 65 IN | WEIGHT: 199.9 LBS | HEART RATE: 60 BPM | OXYGEN SATURATION: 95 % | SYSTOLIC BLOOD PRESSURE: 87 MMHG | BODY MASS INDEX: 33.3 KG/M2

## 2023-10-31 DIAGNOSIS — G95.0 SYRINGOMYELIA (MULTI): ICD-10-CM

## 2023-10-31 DIAGNOSIS — Z86.39 HISTORY OF THYROID NODULE: ICD-10-CM

## 2023-10-31 DIAGNOSIS — G40.909 SEIZURE DISORDER (MULTI): ICD-10-CM

## 2023-10-31 DIAGNOSIS — J43.8 OTHER EMPHYSEMA (MULTI): ICD-10-CM

## 2023-10-31 DIAGNOSIS — Q07.00 ARNOLD-CHIARI SYNDROME WITHOUT SPINA BIFIDA OR HYDROCEPHALUS (MULTI): ICD-10-CM

## 2023-10-31 DIAGNOSIS — E11.9 DIABETES MELLITUS TYPE II, NON INSULIN DEPENDENT (MULTI): Primary | ICD-10-CM

## 2023-10-31 DIAGNOSIS — E24.9 HYPERCORTISOLISM (MULTI): ICD-10-CM

## 2023-10-31 DIAGNOSIS — R55 SYNCOPE AND COLLAPSE: ICD-10-CM

## 2023-10-31 DIAGNOSIS — E11.42 DIABETIC POLYNEUROPATHY ASSOCIATED WITH TYPE 2 DIABETES MELLITUS (MULTI): ICD-10-CM

## 2023-10-31 DIAGNOSIS — E11.9 DIABETES MELLITUS TYPE II, NON INSULIN DEPENDENT (MULTI): ICD-10-CM

## 2023-10-31 LAB
ALBUMIN SERPL BCP-MCNC: 4.2 G/DL (ref 3.4–5)
ALP SERPL-CCNC: 55 U/L (ref 33–110)
ALT SERPL W P-5'-P-CCNC: 23 U/L (ref 7–45)
ANION GAP SERPL CALC-SCNC: 16 MMOL/L (ref 10–20)
AST SERPL W P-5'-P-CCNC: 15 U/L (ref 9–39)
BILIRUB SERPL-MCNC: 0.4 MG/DL (ref 0–1.2)
BUN SERPL-MCNC: 15 MG/DL (ref 6–23)
CALCIUM SERPL-MCNC: 8.5 MG/DL (ref 8.6–10.3)
CHLORIDE SERPL-SCNC: 110 MMOL/L (ref 98–107)
CHOLEST SERPL-MCNC: 161 MG/DL (ref 0–199)
CHOLESTEROL/HDL RATIO: 4.9
CO2 SERPL-SCNC: 18 MMOL/L (ref 21–32)
CREAT SERPL-MCNC: 0.7 MG/DL (ref 0.5–1.05)
CREAT UR-MCNC: 189.8 MG/DL (ref 20–320)
ERYTHROCYTE [DISTWIDTH] IN BLOOD BY AUTOMATED COUNT: 13.2 % (ref 11.5–14.5)
EST. AVERAGE GLUCOSE BLD GHB EST-MCNC: 114 MG/DL
GFR SERPL CREATININE-BSD FRML MDRD: >90 ML/MIN/1.73M*2
GLUCOSE SERPL-MCNC: 111 MG/DL (ref 74–99)
HBA1C MFR BLD: 5.6 %
HCT VFR BLD AUTO: 54.7 % (ref 36–46)
HDLC SERPL-MCNC: 33.1 MG/DL
HGB BLD-MCNC: 19 G/DL (ref 12–16)
INR PPP: 1 (ref 0.9–1.1)
LDLC SERPL CALC-MCNC: 96 MG/DL
MCH RBC QN AUTO: 34.7 PG (ref 26–34)
MCHC RBC AUTO-ENTMCNC: 34.7 G/DL (ref 32–36)
MCV RBC AUTO: 100 FL (ref 80–100)
MICROALBUMIN UR-MCNC: 34.7 MG/L
MICROALBUMIN/CREAT UR: 18.3 UG/MG CREAT
NON HDL CHOLESTEROL: 128 MG/DL (ref 0–149)
NRBC BLD-RTO: 0 /100 WBCS (ref 0–0)
PLATELET # BLD AUTO: 268 X10*3/UL (ref 150–450)
PMV BLD AUTO: 11.8 FL (ref 7.5–11.5)
POTASSIUM SERPL-SCNC: 4.1 MMOL/L (ref 3.5–5.3)
PROT SERPL-MCNC: 7.1 G/DL (ref 6.4–8.2)
PROTHROMBIN TIME: 11.6 SECONDS (ref 9.8–12.8)
RBC # BLD AUTO: 5.47 X10*6/UL (ref 4–5.2)
SODIUM SERPL-SCNC: 140 MMOL/L (ref 136–145)
T3 SERPL-MCNC: 126 NG/DL (ref 60–200)
T4 FREE SERPL-MCNC: 0.95 NG/DL (ref 0.61–1.12)
TRIGL SERPL-MCNC: 161 MG/DL (ref 0–149)
TSH SERPL-ACNC: 0.84 MIU/L (ref 0.44–3.98)
VLDL: 32 MG/DL (ref 0–40)
WBC # BLD AUTO: 11.6 X10*3/UL (ref 4.4–11.3)

## 2023-10-31 PROCEDURE — 99214 OFFICE O/P EST MOD 30 MIN: CPT | Performed by: PHYSICIAN ASSISTANT

## 2023-10-31 PROCEDURE — 80053 COMPREHEN METABOLIC PANEL: CPT

## 2023-10-31 PROCEDURE — 3008F BODY MASS INDEX DOCD: CPT | Performed by: PHYSICIAN ASSISTANT

## 2023-10-31 PROCEDURE — 83036 HEMOGLOBIN GLYCOSYLATED A1C: CPT

## 2023-10-31 PROCEDURE — 3078F DIAST BP <80 MM HG: CPT | Performed by: PHYSICIAN ASSISTANT

## 2023-10-31 PROCEDURE — 4010F ACE/ARB THERAPY RXD/TAKEN: CPT | Performed by: PHYSICIAN ASSISTANT

## 2023-10-31 PROCEDURE — 84443 ASSAY THYROID STIM HORMONE: CPT

## 2023-10-31 PROCEDURE — 84480 ASSAY TRIIODOTHYRONINE (T3): CPT

## 2023-10-31 PROCEDURE — 82043 UR ALBUMIN QUANTITATIVE: CPT

## 2023-10-31 PROCEDURE — 3044F HG A1C LEVEL LT 7.0%: CPT | Performed by: PHYSICIAN ASSISTANT

## 2023-10-31 PROCEDURE — 85610 PROTHROMBIN TIME: CPT

## 2023-10-31 PROCEDURE — 85027 COMPLETE CBC AUTOMATED: CPT

## 2023-10-31 PROCEDURE — 80061 LIPID PANEL: CPT

## 2023-10-31 PROCEDURE — 84439 ASSAY OF FREE THYROXINE: CPT

## 2023-10-31 PROCEDURE — 3074F SYST BP LT 130 MM HG: CPT | Performed by: PHYSICIAN ASSISTANT

## 2023-10-31 PROCEDURE — 82570 ASSAY OF URINE CREATININE: CPT

## 2023-10-31 RX ORDER — INSULIN LISPRO 100 [IU]/ML
1 INJECTION, SOLUTION INTRAVENOUS; SUBCUTANEOUS
Qty: 3 ML | Refills: 11 | Status: SHIPPED | OUTPATIENT
Start: 2023-10-31 | End: 2024-01-17 | Stop reason: WASHOUT

## 2023-10-31 RX ORDER — DULAGLUTIDE 1.5 MG/.5ML
1.5 INJECTION, SOLUTION SUBCUTANEOUS
Qty: 2 ML | Refills: 11 | Status: SHIPPED | OUTPATIENT
Start: 2023-10-31 | End: 2024-04-02 | Stop reason: WASHOUT

## 2023-10-31 ASSESSMENT — PATIENT HEALTH QUESTIONNAIRE - PHQ9
2. FEELING DOWN, DEPRESSED OR HOPELESS: NOT AT ALL
SUM OF ALL RESPONSES TO PHQ9 QUESTIONS 1 AND 2: 0
2. FEELING DOWN, DEPRESSED OR HOPELESS: NOT AT ALL
SUM OF ALL RESPONSES TO PHQ9 QUESTIONS 1 AND 2: 0
1. LITTLE INTEREST OR PLEASURE IN DOING THINGS: NOT AT ALL
1. LITTLE INTEREST OR PLEASURE IN DOING THINGS: NOT AT ALL
SUM OF ALL RESPONSES TO PHQ9 QUESTIONS 1 AND 2: 0
2. FEELING DOWN, DEPRESSED OR HOPELESS: NOT AT ALL
1. LITTLE INTEREST OR PLEASURE IN DOING THINGS: NOT AT ALL

## 2023-10-31 ASSESSMENT — PAIN SCALES - GENERAL: PAINLEVEL: 6

## 2023-10-31 ASSESSMENT — ENCOUNTER SYMPTOMS
OCCASIONAL FEELINGS OF UNSTEADINESS: 1
LOSS OF SENSATION IN FEET: 1
NERVOUS/ANXIOUS: 0
DEPRESSION: 0

## 2023-10-31 ASSESSMENT — COLUMBIA-SUICIDE SEVERITY RATING SCALE - C-SSRS
2. HAVE YOU ACTUALLY HAD ANY THOUGHTS OF KILLING YOURSELF?: NO
1. IN THE PAST MONTH, HAVE YOU WISHED YOU WERE DEAD OR WISHED YOU COULD GO TO SLEEP AND NOT WAKE UP?: NO
6. HAVE YOU EVER DONE ANYTHING, STARTED TO DO ANYTHING, OR PREPARED TO DO ANYTHING TO END YOUR LIFE?: NO

## 2023-10-31 NOTE — PROGRESS NOTES
Subjective   Ruth Marin is a 47 y.o. female who presents for follow-up of Type 2 diabetes mellitus. The initial diagnosis of diabetes was made  over 10 years ago . The patient does have a known family history of diabetes.    Known complications due to diabetes included peripheral neuropathy, peripheral vascular disease, and obesity    Cardiovascular risk factors include diabetes mellitus, obesity (BMI >= 30 kg/m2), sedentary lifestyle, and smoking/ tobacco exposure. The patient is on an ACE inhibitor or angiotensin II receptor blocker.  The patient has not been previously hospitalized due to diabetic ketoacidosis.     Current symptoms/problems include hyperglycemia and paresthesia of the feet. Her clinical course has been stable.     Current diabetes regimen is as follows:  trulicity 1.5mg, lispro sliding scale as needed      The patient is currently checking the blood glucose 2+ times per day.    Patient is using: glucometer    Hypoglycemia frequency: n/a  Hypoglycemia awareness: Yes     Exercise: intermittently   Meal panning: She is using avoidance of concentrated sweets.    Getting set up for L knee surgery and aortic stent placement. Hopes to find a new plastic surgeon who accepts her insurance for pannus skin reduction.    Review of Systems   Psychiatric/Behavioral:  The patient is not nervous/anxious.         Mood stability improved with lamictal start   All other systems reviewed and are negative.      Objective   There were no vitals taken for this visit.  Physical Exam  Constitutional:       Appearance: Normal appearance. She is obese.   Cardiovascular:      Pulses: Normal pulses.           Dorsalis pedis pulses are 2+ on the right side and 2+ on the left side.        Posterior tibial pulses are 2+ on the right side and 2+ on the left side.   Feet:      Right foot:      Protective Sensation: 10 sites tested.   1 site sensed.     Skin integrity: Dry skin present. No skin breakdown.      Toenail  Condition: Right toenails are normal.      Left foot:      Protective Sensation: 10 sites tested.   1 site sensed.     Skin integrity: Dry skin present. No skin breakdown.      Toenail Condition: Left toenails are normal.   Skin:     Findings: Erythema present.      Comments: Telangectasias on face   Neurological:      General: No focal deficit present.      Mental Status: She is alert and oriented to person, place, and time. Mental status is at baseline.   Psychiatric:         Mood and Affect: Mood normal.         Behavior: Behavior normal.         Thought Content: Thought content normal.         Judgment: Judgment normal.      Comments: Talkative - baseline         Lab Review  Glucose (mg/dL)   Date Value   06/20/2023 98   01/28/2022 225 (H)   01/13/2022 159 (H)     Hemoglobin A1C (%)   Date Value   04/04/2023 5.9 (A)   01/13/2022 8.5 (A)   06/15/2021 7.5     Bicarbonate (mmol/L)   Date Value   06/20/2023 20 (L)   01/28/2022 28   01/13/2022 27     Urea Nitrogen (mg/dL)   Date Value   06/20/2023 21   01/28/2022 15   01/13/2022 24 (H)     Creatinine (mg/dL)   Date Value   06/20/2023 0.90   01/28/2022 0.62   01/13/2022 0.95       Health Maintenance:   Foot Exam: updated today  Eye Exam: due for update, scheduled for tomorrow  Lipid Panel: due for update, ordered today  Urine Albumin: due for update, ordered today    Assessment/Plan   Type 2 diabetes mellitus, is at goal. A1C, lipid panel, urine albumin labs all due to for update.     RX changes: none - continue Trulicity 1.5mg once weekly and lispro as per sliding scale as needed  Education:  interpretation of lab results and blood sugar goals  Follow up: I recommend diabetes care be in 6 months.

## 2023-10-31 NOTE — PATIENT INSTRUCTIONS
Type 2 diabetes mellitus, is at goal. A1C, lipid panel, urine albumin labs all due to for update.     RX changes: none - continue Trulicity 1.5mg once weekly and lispro as per sliding scale as needed  Education:  interpretation of lab results and blood sugar goals  Follow up: I recommend diabetes care be in 6 months.

## 2023-11-01 ENCOUNTER — APPOINTMENT (OUTPATIENT)
Dept: CARDIOLOGY | Facility: CLINIC | Age: 48
End: 2023-11-01
Payer: COMMERCIAL

## 2023-11-05 DIAGNOSIS — E78.5 HYPERLIPIDEMIA LDL GOAL <100: Primary | ICD-10-CM

## 2023-11-08 RX ORDER — ATORVASTATIN CALCIUM 40 MG/1
40 TABLET, FILM COATED ORAL NIGHTLY
Qty: 90 TABLET | Refills: 3 | Status: SHIPPED | OUTPATIENT
Start: 2023-11-08 | End: 2024-04-03 | Stop reason: SDUPTHER

## 2023-11-10 RX ORDER — SODIUM CHLORIDE 9 MG/ML
10 INJECTION, SOLUTION INTRAVENOUS CONTINUOUS
Status: CANCELLED | OUTPATIENT
Start: 2023-11-10

## 2023-11-10 RX ORDER — MUPIROCIN 20 MG/G
1 OINTMENT TOPICAL ONCE
Status: CANCELLED | OUTPATIENT
Start: 2023-11-10 | End: 2023-11-10

## 2023-11-10 RX ORDER — CHLORHEXIDINE GLUCONATE 40 MG/ML
SOLUTION TOPICAL ONCE
Status: CANCELLED | OUTPATIENT
Start: 2023-11-10 | End: 2023-11-10

## 2023-11-10 RX ORDER — VANCOMYCIN HYDROCHLORIDE 1 G/200ML
1000 INJECTION, SOLUTION INTRAVENOUS ONCE
Status: CANCELLED | OUTPATIENT
Start: 2023-11-10 | End: 2023-11-10

## 2023-11-13 DIAGNOSIS — R55 SYNCOPE AND COLLAPSE: ICD-10-CM

## 2023-11-13 DIAGNOSIS — I49.5 SINUS NODE DYSFUNCTION (MULTI): ICD-10-CM

## 2023-11-13 RX ORDER — LAMOTRIGINE 100 MG/1
TABLET ORAL
COMMUNITY
End: 2024-01-17 | Stop reason: WASHOUT

## 2023-11-13 RX ORDER — CLOTRIMAZOLE 1 %
1 CREAM (GRAM) TOPICAL 2 TIMES DAILY
COMMUNITY

## 2023-11-13 RX ORDER — FENOFIBRATE 145 MG/1
145 TABLET, FILM COATED ORAL DAILY
COMMUNITY
End: 2024-01-17 | Stop reason: WASHOUT

## 2023-11-13 RX ORDER — SPIRONOLACTONE 50 MG/1
50 TABLET, FILM COATED ORAL DAILY
COMMUNITY
End: 2024-01-17 | Stop reason: WASHOUT

## 2023-11-15 ENCOUNTER — DOCUMENTATION (OUTPATIENT)
Dept: CARDIOLOGY | Facility: CLINIC | Age: 48
End: 2023-11-15
Payer: COMMERCIAL

## 2023-11-15 NOTE — PROGRESS NOTES
Patient was called.  Patient was supposed to have a loop recorder implanted around summer 2023 but apparently insurance has denied.  Patient has a history of bradycardia and also nonsustained ventricular tachycardia and apparently she still feeling palpitations.  We will set up an appointment within the next few days to do we discussed options of therapy.    Latrell Monson MD

## 2023-12-05 ENCOUNTER — OFFICE VISIT (OUTPATIENT)
Dept: OTOLARYNGOLOGY | Facility: CLINIC | Age: 48
End: 2023-12-05
Payer: COMMERCIAL

## 2023-12-05 ENCOUNTER — CLINICAL SUPPORT (OUTPATIENT)
Dept: AUDIOLOGY | Facility: CLINIC | Age: 48
End: 2023-12-05
Payer: COMMERCIAL

## 2023-12-05 VITALS — TEMPERATURE: 97.1 F | WEIGHT: 201.8 LBS | BODY MASS INDEX: 35.75 KG/M2 | HEIGHT: 63 IN

## 2023-12-05 DIAGNOSIS — H92.11 OTORRHEA OF RIGHT EAR: ICD-10-CM

## 2023-12-05 DIAGNOSIS — H90.A31 MIXED CONDUCTIVE AND SENSORINEURAL HEARING LOSS OF RIGHT EAR WITH RESTRICTED HEARING OF LEFT EAR: ICD-10-CM

## 2023-12-05 DIAGNOSIS — H61.21 IMPACTED CERUMEN OF RIGHT EAR: ICD-10-CM

## 2023-12-05 DIAGNOSIS — H69.93 DYSFUNCTION OF BOTH EUSTACHIAN TUBES: Primary | ICD-10-CM

## 2023-12-05 DIAGNOSIS — H65.199 ACUTE NONSUPPURATIVE OTITIS MEDIA: Primary | ICD-10-CM

## 2023-12-05 PROCEDURE — 3060F POS MICROALBUMINURIA REV: CPT | Performed by: OTOLARYNGOLOGY

## 2023-12-05 PROCEDURE — 69210 REMOVE IMPACTED EAR WAX UNI: CPT | Performed by: OTOLARYNGOLOGY

## 2023-12-05 PROCEDURE — 99204 OFFICE O/P NEW MOD 45 MIN: CPT | Performed by: OTOLARYNGOLOGY

## 2023-12-05 PROCEDURE — 92557 COMPREHENSIVE HEARING TEST: CPT | Performed by: AUDIOLOGIST

## 2023-12-05 PROCEDURE — 4004F PT TOBACCO SCREEN RCVD TLK: CPT | Performed by: OTOLARYNGOLOGY

## 2023-12-05 PROCEDURE — 92550 TYMPANOMETRY & REFLEX THRESH: CPT | Performed by: AUDIOLOGIST

## 2023-12-05 PROCEDURE — 3044F HG A1C LEVEL LT 7.0%: CPT | Performed by: OTOLARYNGOLOGY

## 2023-12-05 PROCEDURE — 3048F LDL-C <100 MG/DL: CPT | Performed by: OTOLARYNGOLOGY

## 2023-12-05 PROCEDURE — 4010F ACE/ARB THERAPY RXD/TAKEN: CPT | Performed by: OTOLARYNGOLOGY

## 2023-12-05 PROCEDURE — 3008F BODY MASS INDEX DOCD: CPT | Performed by: OTOLARYNGOLOGY

## 2023-12-05 RX ORDER — CIPROFLOXACIN AND DEXAMETHASONE 3; 1 MG/ML; MG/ML
4 SUSPENSION/ DROPS AURICULAR (OTIC) 2 TIMES DAILY
Qty: 7.5 ML | Refills: 0 | Status: SHIPPED | OUTPATIENT
Start: 2023-12-05 | End: 2023-12-15

## 2023-12-05 ASSESSMENT — ENCOUNTER SYMPTOMS: OCCASIONAL FEELINGS OF UNSTEADINESS: 0

## 2023-12-05 ASSESSMENT — PAIN SCALES - GENERAL: PAINLEVEL_OUTOF10: 0 - NO PAIN

## 2023-12-05 ASSESSMENT — PAIN - FUNCTIONAL ASSESSMENT: PAIN_FUNCTIONAL_ASSESSMENT: 0-10

## 2023-12-05 NOTE — PROGRESS NOTES
Impression:  1. Acute nonsuppurative otitis media  ciprofloxacin-dexamethasone (CiproDEX) otic suspension      2. Otorrhea of right ear  ciprofloxacin-dexamethasone (CiproDEX) otic suspension      3. Impacted cerumen of right ear              RECOMMENDATIONS/PLAN :  I explained to the patient that she does have a middle ear infection on that right side with significant drainage coming through her PE tube.  I was able to remove a large amount of cerumen and infection from her external canal using the operative microscope and suction.  We will need to start her on Ciprodex drops-4 drops in that right ear twice daily for the next 10 days.  She absolutely must keep all water out of her ears and she absolutely must stop smoking.  I will see her back in the office over the next 4 weeks to recheck that right ear.      **This electronic medical record note was created with the use of voice recognition software.  Despite proofreading, typographical or grammatical errors may be present that could affect meaning of content **    Subjective   Patient ID:     Ruth Marin is a 48 y.o. female who presents to the office today complaining of persistent drainage and fullness with pressure in the right ear.  Apparently she had a PE tube placed by a different ENT surgeon about 4 -5 years ago.  Recently she has had persistent drainage and she has been on some other eardrop however the drainage continues.  She has been trying to keep all water out of her right ear.  No problems in the left ear.  Unfortunately she is still smoking and we had a lengthy discussion regarding the fact that she absolutely must quit.    ROS:  A detailed 12 system review of systems is noted on the intake form has been reviewed with the patient with details noted in the HPI and scanned into the patient's medical record.    Objective     Past Medical History:   Diagnosis Date    Other disorders of lung     Lung trouble    Personal history of other diseases  of the digestive system     History of gastroesophageal reflux (GERD)    Personal history of other diseases of the musculoskeletal system and connective tissue     History of arthritis    Personal history of other diseases of the nervous system and sense organs     History of sleep apnea    Personal history of other diseases of the respiratory system     History of chronic obstructive lung disease    Personal history of other diseases of the respiratory system     History of bronchitis    Personal history of other diseases of the respiratory system     History of asthma    Personal history of other endocrine, nutritional and metabolic disease     History of diabetes mellitus    Personal history of other endocrine, nutritional and metabolic disease     History of thyroid disorder    Personal history of other mental and behavioral disorders     History of depression    Personal history of other mental and behavioral disorders     History of mental disorder    Personal history of other specified conditions     History of snoring        Past Surgical History:   Procedure Laterality Date    BRAIN SURGERY      GALLBLADDER SURGERY  12/07/2017    Gallbladder Surgery    TONSILLECTOMY  12/07/2017    Tonsillectomy With Adenoidectomy        Allergies   Allergen Reactions    Duloxetine Unknown    Pregabalin Unknown    Shellfish Derived Unknown          Current Outpatient Medications:     atorvastatin (Lipitor) 40 mg tablet, TAKE 1 TABLET BY MOUTH EVERYDAY AT BEDTIME (Patient taking differently: Take 2 tablets (80 mg) by mouth once daily at bedtime.), Disp: 90 tablet, Rfl: 3    blood sugar diagnostic (OneTouch Verio test strips) strip, Test blood sugars 4 times a day as directed, Disp: , Rfl:     Briviact 100 mg tablet tablet, TAKE 2 TABLETS BY MOUTH TWICE A DAY, Disp: 120 tablet, Rfl: 0    cholecalciferol (Vitamin D-3) 125 MCG (5000 UT) capsule, Take 1 capsule (125 mcg) by mouth 1 (one) time per week. Once weekly on friday,  Disp: , Rfl:     clotrimazole (Lotrimin) 1 % cream, Apply 1 Application topically 2 times a day., Disp: , Rfl:     diclofenac sodium (Voltaren) 1 % gel gel, APPLY 4 INCHES OF MEDICATION TO EACH KNEE EVERY 4 HOURS AS NEEDED, Disp: , Rfl:     dulaglutide (Trulicity) 1.5 mg/0.5 mL pen injector injection, Inject 1.5 mg under the skin 1 (one) time per week. (Patient taking differently: Inject 1.5 mg under the skin 1 (one) time per week. Patient states  Put on hold.), Disp: 2 mL, Rfl: 11    gabapentin (Neurontin) 600 mg tablet, Take 2 tablets (1,200 mg) by mouth., Disp: , Rfl:     lisinopril 5 mg tablet, TAKE 1 TABLET BY MOUTH EVERY DAY, Disp: 30 tablet, Rfl: 3    montelukast (Singulair) 10 mg tablet, Take 1 tablet (10 mg) by mouth once daily at bedtime., Disp: , Rfl:     ondansetron (Zofran) 8 mg tablet, Take 1 tablet (8 mg) by mouth every 8 hours if needed for nausea., Disp: , Rfl:     pantoprazole (ProtoNix) 40 mg EC tablet, Take 1 tablet (40 mg) by mouth 2 times a day., Disp: , Rfl:     perampaneL (Fycompa) 4 mg tablet, Take 1 tablet (4 mg) by mouth once daily at bedtime., Disp: , Rfl:     topiramate (Topamax) 25 mg tablet, Take 4 tablets (100 mg) by mouth once daily., Disp: , Rfl:     traMADol (Ultram) 50 mg tablet, Take 1 tablet (50 mg) by mouth 3 times a day., Disp: , Rfl:     umeclidinium-vilanteroL (Anoro Ellipta) 62.5-25 mcg/actuation blister with device, Inhale 1 puff once daily., Disp: , Rfl:     valACYclovir (Valtrex) 500 mg tablet, Take 1 tablet (500 mg) by mouth once daily., Disp: , Rfl:     albuterol (Ventolin HFA) 90 mcg/actuation inhaler, Inhale 2 puffs every 6 hours if needed., Disp: , Rfl:     aspirin 81 mg EC tablet, Take 1 tablet (81 mg) by mouth once daily., Disp: , Rfl:     ciprofloxacin-dexamethasone (CiproDEX) otic suspension, Administer 4 drops into the right ear 2 times a day for 10 days., Disp: 7.5 mL, Rfl: 0    cyclobenzaprine (Flexeril) 10 mg tablet, Take 1 tablet (10 mg) by mouth 3 times  "a day as needed for muscle spasms., Disp: , Rfl:     fenofibrate (Tricor) 145 mg tablet, Take 1 tablet (145 mg) by mouth once daily., Disp: , Rfl:     insulin lispro (HumaLOG) 100 unit/mL injection, Inject 1 Units under the skin 3 times a day with meals. Sliding scale (Patient not taking: Reported on 12/5/2023), Disp: 3 mL, Rfl: 11    lamoTRIgine (LaMICtal) 100 mg tablet, Take by mouth., Disp: , Rfl:     lamoTRIgine (LaMICtal) 25 mg tablet, Take 2 tablets (50 mg) by mouth once daily., Disp: , Rfl:     spironolactone (Aldactone) 50 mg tablet, TAKE 1 TABLET BY MOUTH TWICE A DAY (Patient not taking: Reported on 12/5/2023), Disp: 60 tablet, Rfl: 3    spironolactone (Aldactone) 50 mg tablet, Take 1 tablet (50 mg) by mouth once daily., Disp: , Rfl:      Tobacco Use: High Risk (12/5/2023)    Patient History     Smoking Tobacco Use: Every Day     Smokeless Tobacco Use: Never     Passive Exposure: Not on file        Alcohol Use: Not on file        Social History     Substance and Sexual Activity   Drug Use Yes    Types: Marijuana        Physical Exam:  Visit Vitals  Temp 36.2 °C (97.1 °F) (Temporal)   Ht 1.588 m (5' 2.5\")   Wt 91.5 kg (201 lb 12.8 oz)   BMI 36.32 kg/m²   Smoking Status Every Day   BSA 2.01 m²      General: Patient is alert, oriented, cooperative in no apparent distress.  Head: Normocephalic, atraumatic.  Eyes: PERRL, EOMI, Conjunctiva is clear. No nystagmus.  Ears: Right Ear-- Pinna is normal.  External auditory canal is occluded with wet cerumen.  Using the operative microscope and suction I was able to remove this and she was feeling better..  Her PE tube is in place and she does have infectious drainage that is draining through it.  I was able to remove this and 4 drops of Ciprodex were placed in the external canal followed by cottonball.  Mastoid is nontender.  Left ear-- Pinna is normal.  External auditory canal is patent. Tympanic membrane is [intact, translucent and has good mobility with my pneumatic " otoscope.  No effusion].  Mastoid is nontender.  Nose: Septum is straight.  No septal perforation or lesions. No septal hematoma/ seroma.  No signs of bleeding.  Inferior turbinates are mildly swollen.   No evidence of intranasal polyps.  No infectious drainage.  Throat:  Floor of mouth is clear, no masses.  Tongue appears normal, no lesions or masses. Gums, gingiva, buccal mucosa appear pink and moist, no lesions. Teeth are in good repair.  No obvious dental infections.  Peritonsillar regions appear symmetric without swelling.  Hard and soft palate appear normal, no obvious cleft. Uvula is midline.  Oropharynx: No lesions. Retropharyngeal wall is flat.  No active postnasal drip.  Neck: Supple,  no lymphadenopathy.  No masses.  Salivary Glands: Symmetric bilaterally.  No palpable masses.  No evidence of acute infection or salivary stones  Neurologic: Cranial Nerves 2-12 are grossly intact without focal deficits. Cerebellar function testing is normal.     Results:   []    Procedure:   After informed consent was obtained with the risks, benefits, complications, and alternatives explained to the patient / guardian, the patient was laid back in the ENT chair and the operative microscope was brought to the right ear.  A speculum was placed and using the operative microscope and/or curette/ suction, I was able to carefully remove all of the impacted wet cerumen that was causing pain/ hearing loss.  After doing so, the patient was feeling much better and had much less pain.  She does have infectious drainage that is coming through her PE tube.  4 drops of Ciprodex suspension were instilled in the external canal followed by cottonball.  The patient tolerated the procedure well and there were no complications.      Marcin Montenegro, DO

## 2023-12-05 NOTE — PROGRESS NOTES
"AUDIOLOGY ADULT AUDIOMETRIC EVALUATION      Name:  Ruth Marin  :  1975  Age:  48 y.o.  Date of Evaluation: 23    History:  Reason for visit:  Ms. Ruth Marin was seen today as part of the visit with Marcin Montenegro D.O. for an evaluation of hearing.   Chief Complaint   Patient presents with    Hearing Loss    Hearing Problem     The patient stated she has experiencing difficulty with hear ears, especially the right ear for many years. Stated she had a right PE tube placed in the ear approximately 4-5 years ago by an outside physician. She feels the tube has not been helpful and she has continued to experience right sided ear infections with drainage, occasional bloody discharge and discomfort. She is interested in determining if the right tube could be removed.   Mentioned she has experienced a right sided hearing loss since childhood and that is she \"deaf\" and utilizes lipreading. She has not pursued traditional amplification. Reported she has hearing loss in the left ear, however, feels the left ear is able to hear better then the right.   Reported a history of brain surgery for seizures as well as a previous history of a fall with head trauma since childhood.   She has continued to experience some dizziness and imbalance.   Denied any current otalgia, aural fullness, tinnitus, ear pressure,  recent ear/sinus infections, or sudden hearing loss.    EVALUATION      See Audiogram    RESULTS:    Otoscopic Evaluation:   Right Ear: Otoscopy for the right ear revealed a clear healthy canal with a pressure equalization tube, drainage and a scarred tympanic membrane was visualized.   Left Ear: Otoscopy for the left ear revealed a clear healthy canal and a healthy tympanic membrane was visualized.     Immittance:  Immittance Measures: 226 Hz   Right Ear: Tympanometric testing revealed a type B flat tympanogram with no measurable middle ear pressure or static compliance and normal ear canal volume. " Results may be consistent with middle ear effusion or a non-functioning PE tube.  Left Ear: Tympanometric testing revealed a type As tympanogram with normal middle ear pressure and reduced static compliance.    Right Ear: Ipsilateral acoustic reflexes were absent at, 500-4,000 Hz. Results are consistent with the test results.   Left Ear: Ipsilateral acoustic reflexes were present at, 500-2,000 Hz, at expected sensation levels and absent at 4,000 Hz.    Test technique:  Pure Tone Audiometry via insert earphones.     Reliability:   excellent    Pure Tone Audiometry:    Right Ear: Audiometric testing of the right ear using insert earphones indicated a profound mixed hearing loss.   Left Ear:   Audiometric testing of the left ear using insert earphones indicated a moderate to moderately severe sensorineural hearing loss through 2,000 Hz, sloping to a severe sensorineural hearing loss above.  Note conductive components and significant asymmetry in the right ear.       Speech Audiometry:   Right Ear:  Speech Reception Threshold (SRT) was obtained at 75 dBHL                 Word Recognition scores were poor (56%) in quiet when words were presented at 105 dBHL  Left Ear:  Speech Reception Threshold (SRT) was obtained at 50 dBHL                 Word Recognition scores were fair (72%) in quiet when words were presented at 90 dBHL  Testing was performed with recorded NU-6 speech words in quiet. Speech thresholds were in in poor agreement with the pure tone averages in each ear. Note discrepancy between right PTA and SRT agreement.     IMPRESSIONS:  Today's test results are hearing loss requiring medical/otologic and audiologic follow-up.  The patient was counseled with regard to the findings.    RECOMMENDATIONS:  * Continue medical follow up with Marcin Montenegro D.O.  * Retest as medically indicated, or sooner if a change in hearing sensitivity is noticed.   * Wear hearing protection while in the presence of loud sounds.   * Use  tinnitus coping strategies as needed, such as sound apps on a smart phone, utilizing calming noise in the room, running a fan at night, etc.   * Pending medical management and patient desire, consider a hearing aid evaluation with an in network hearing aid provider to discuss amplification options and communication needs. T* Use effective communication strategies such as asking the speaker to gain attention prior to speaking, speaking in the same room, repeating words that were heard, etc.  * Consider a cochlear implant evaluation with the [place] office.    PATIENT EDUCATION:   Discussed results and recommendations with the patient and a copy of the hearing test was provided.  Questions were addressed and the patient was encouraged to contact our department should concerns arise.  The patient was seen from  8:30-9:00 am.

## 2023-12-13 PROBLEM — I10 HTN (HYPERTENSION), BENIGN: Status: ACTIVE | Noted: 2023-12-13

## 2023-12-13 PROBLEM — K59.00 CONSTIPATION, UNSPECIFIED: Status: ACTIVE | Noted: 2023-12-13

## 2023-12-13 PROBLEM — J44.9 CHRONIC OBSTRUCTIVE PULMONARY DISEASE (MULTI): Status: ACTIVE | Noted: 2018-08-29

## 2023-12-13 PROBLEM — E24.0 PITUITARY CUSHING'S SYNDROME (MULTI): Status: ACTIVE | Noted: 2023-12-13

## 2023-12-13 PROBLEM — A60.00 GENITAL HSV: Status: ACTIVE | Noted: 2023-12-13

## 2023-12-13 PROBLEM — E04.1 THYROID NODULE: Status: ACTIVE | Noted: 2019-01-24

## 2023-12-13 PROBLEM — R51.9 HEADACHE DISORDER: Status: ACTIVE | Noted: 2019-01-10

## 2023-12-13 PROBLEM — R10.13 EPIGASTRIC PAIN: Status: ACTIVE | Noted: 2023-12-13

## 2023-12-13 PROBLEM — R63.4 ABNORMAL LOSS OF WEIGHT: Status: ACTIVE | Noted: 2023-12-13

## 2023-12-13 PROBLEM — F41.9 ANXIETY: Status: ACTIVE | Noted: 2023-12-13

## 2023-12-13 PROBLEM — G89.29 CHRONIC MIDLINE LOW BACK PAIN WITH BILATERAL SCIATICA: Status: ACTIVE | Noted: 2019-02-14

## 2023-12-13 PROBLEM — R56.9 SEIZURE (MULTI): Status: ACTIVE | Noted: 2018-08-29

## 2023-12-13 PROBLEM — E11.9 DIABETES MELLITUS (MULTI): Status: ACTIVE | Noted: 2023-12-13

## 2023-12-13 PROBLEM — J44.89 COPD WITH ASTHMA (MULTI): Status: ACTIVE | Noted: 2023-12-13

## 2023-12-13 PROBLEM — F19.10 DRUG ABUSE (MULTI): Status: ACTIVE | Noted: 2023-12-13

## 2023-12-13 PROBLEM — E28.2 POLYCYSTIC DISEASE, OVARIES: Status: ACTIVE | Noted: 2023-12-13

## 2023-12-13 PROBLEM — E24.0: Status: ACTIVE | Noted: 2023-12-13

## 2023-12-13 PROBLEM — F32.A DEPRESSION: Status: ACTIVE | Noted: 2019-02-13

## 2023-12-13 PROBLEM — K62.5 RECTAL BLEED: Status: ACTIVE | Noted: 2023-12-13

## 2023-12-13 PROBLEM — E78.00 HIGH CHOLESTEROL: Chronic | Status: ACTIVE | Noted: 2023-12-13

## 2023-12-13 PROBLEM — E78.00 HYPERCHOLESTEROLEMIA: Status: ACTIVE | Noted: 2019-01-24

## 2023-12-13 PROBLEM — G40.909 EPILEPSY (MULTI): Status: ACTIVE | Noted: 2023-12-13

## 2023-12-13 PROBLEM — M76.30 ITB SYNDROME: Status: ACTIVE | Noted: 2023-12-13

## 2023-12-13 PROBLEM — G47.30 SLEEP APNEA: Status: ACTIVE | Noted: 2023-12-13

## 2023-12-13 PROBLEM — I70.213 ATHEROSCLEROSIS OF NATIVE ARTERY OF BOTH LOWER EXTREMITIES WITH INTERMITTENT CLAUDICATION (CMS-HCC): Status: ACTIVE | Noted: 2019-02-14

## 2023-12-13 PROBLEM — I10 HYPERTENSION: Status: ACTIVE | Noted: 2019-01-24

## 2023-12-13 PROBLEM — F17.200 NICOTINE USE DISORDER: Status: ACTIVE | Noted: 2019-01-10

## 2023-12-13 PROBLEM — J43.8 OTHER EMPHYSEMA (MULTI): Status: ACTIVE | Noted: 2019-01-24

## 2023-12-13 PROBLEM — E11.9 DIABETES (MULTI): Status: ACTIVE | Noted: 2023-12-13

## 2023-12-13 PROBLEM — I74.09 AORTOILIAC OCCLUSIVE DISEASE (MULTI): Status: ACTIVE | Noted: 2019-02-14

## 2023-12-13 PROBLEM — N92.6 IRREGULAR MENSES: Status: ACTIVE | Noted: 2019-01-24

## 2023-12-13 PROBLEM — N92.0 MENORRHAGIA: Status: ACTIVE | Noted: 2023-12-13

## 2023-12-13 PROBLEM — F17.200 CURRENT SMOKER: Status: ACTIVE | Noted: 2023-12-13

## 2023-12-13 PROBLEM — M54.41 CHRONIC MIDLINE LOW BACK PAIN WITH BILATERAL SCIATICA: Status: ACTIVE | Noted: 2019-02-14

## 2023-12-13 PROBLEM — H90.3 ASYMMETRIC SNHL (SENSORINEURAL HEARING LOSS): Status: ACTIVE | Noted: 2023-12-13

## 2023-12-13 PROBLEM — M54.42 CHRONIC MIDLINE LOW BACK PAIN WITH BILATERAL SCIATICA: Status: ACTIVE | Noted: 2019-02-14

## 2023-12-13 PROBLEM — K21.9 ACID REFLUX: Status: ACTIVE | Noted: 2023-12-13

## 2023-12-13 PROBLEM — F17.200 TOBACCO USE DISORDER: Status: ACTIVE | Noted: 2023-12-13

## 2023-12-13 PROBLEM — N93.8 DUB (DYSFUNCTIONAL UTERINE BLEEDING): Status: ACTIVE | Noted: 2023-12-13

## 2023-12-13 PROBLEM — G47.33 OSA ON CPAP: Status: ACTIVE | Noted: 2023-12-13

## 2023-12-13 PROBLEM — F10.11 H/O ETOH ABUSE: Status: ACTIVE | Noted: 2023-12-13

## 2023-12-13 RX ORDER — TOPIRAMATE 100 MG/1
100 TABLET, FILM COATED ORAL DAILY
COMMUNITY
Start: 2023-11-06 | End: 2024-01-17 | Stop reason: WASHOUT

## 2023-12-13 RX ORDER — CLOTRIMAZOLE AND BETAMETHASONE DIPROPIONATE 10; .64 MG/G; MG/G
CREAM TOPICAL
COMMUNITY
Start: 2023-12-01 | End: 2024-01-17 | Stop reason: WASHOUT

## 2023-12-13 RX ORDER — BUPROPION HYDROCHLORIDE 150 MG/1
TABLET, EXTENDED RELEASE ORAL
COMMUNITY
Start: 2023-12-03

## 2023-12-13 RX ORDER — ERGOCALCIFEROL 1.25 MG/1
CAPSULE ORAL
COMMUNITY
Start: 2023-12-01 | End: 2023-12-19

## 2023-12-19 DIAGNOSIS — E55.9 VITAMIN D DEFICIENCY: Primary | ICD-10-CM

## 2023-12-19 RX ORDER — ERGOCALCIFEROL 1.25 MG/1
1 CAPSULE ORAL
Qty: 4 CAPSULE | Refills: 4 | Status: SHIPPED | OUTPATIENT
Start: 2023-12-19

## 2023-12-28 ENCOUNTER — APPOINTMENT (OUTPATIENT)
Dept: ORTHOPEDIC SURGERY | Facility: CLINIC | Age: 48
End: 2023-12-28
Payer: COMMERCIAL

## 2024-01-02 ENCOUNTER — OFFICE VISIT (OUTPATIENT)
Dept: OTOLARYNGOLOGY | Facility: CLINIC | Age: 49
End: 2024-01-02
Payer: COMMERCIAL

## 2024-01-02 VITALS — HEIGHT: 63 IN | WEIGHT: 201 LBS | BODY MASS INDEX: 35.61 KG/M2 | TEMPERATURE: 97.6 F

## 2024-01-02 DIAGNOSIS — H65.199 ACUTE NONSUPPURATIVE OTITIS MEDIA: Primary | ICD-10-CM

## 2024-01-02 PROCEDURE — 99213 OFFICE O/P EST LOW 20 MIN: CPT | Performed by: OTOLARYNGOLOGY

## 2024-01-02 PROCEDURE — 3008F BODY MASS INDEX DOCD: CPT | Performed by: OTOLARYNGOLOGY

## 2024-01-02 PROCEDURE — 4010F ACE/ARB THERAPY RXD/TAKEN: CPT | Performed by: OTOLARYNGOLOGY

## 2024-01-02 NOTE — PROGRESS NOTES
Impression:              1. Acute nonsuppurative otitis media             Recommendations/Plan:  I reassured the patient there is no further evidence of infection in that right ear and her ear tube is functioning normally now.  She can stop her Ciprodex drops for now however she must keep all water out of her right ear.  We also discussed the fact that she absolutely must quit smoking sooner than later.  I will see her back in the office in 6 months and recheck her right ear tube.    **This electronic medical record note was created with the use of voice recognition software.  Despite proofreading, typographical or grammatical errors may be present that could affect meaning of content **    Subjective:  Ruth returns to the office today as a recheck on her ears.  She had been using her Ciprodex drops as directed.  She no longer has had drainage from that right ear.  Her hearing has improved.  She has been keeping all water out of her ears.  No problems in the left ear.    Objective:    Visit Vitals  Temp 36.4 °C (97.6 °F) (Temporal)        Current Outpatient Medications   Medication Instructions    albuterol (Ventolin HFA) 90 mcg/actuation inhaler 2 puffs, inhalation, Every 6 hours PRN    aspirin 81 mg EC tablet 1 tablet, oral, Daily    atorvastatin (LIPITOR) 40 mg, oral, Nightly    blood sugar diagnostic (OneTouch Verio test strips) strip Test blood sugars 4 times a day as directed    Briviact 200 mg, oral, 2 times daily    buPROPion SR (Wellbutrin SR) 150 mg 12 hr tablet     cholecalciferol (Vitamin D-3) 125 MCG (5000 UT) capsule 1 capsule, oral, Weekly, Once weekly on friday    clotrimazole (Lotrimin) 1 % cream 1 Application, Topical, 2 times daily    clotrimazole-betamethasone (Lotrisone) cream     cyclobenzaprine (FLEXERIL) 10 mg, oral, 3 times daily PRN    diclofenac sodium (Voltaren) 1 % gel gel APPLY 4 INCHES OF MEDICATION TO EACH KNEE EVERY 4 HOURS AS NEEDED    ergocalciferol (VITAMIN D-2) 1,250 mcg,  oral, Weekly    fenofibrate (TRICOR) 145 mg, oral, Daily    Fycompa 4 mg, oral, Nightly    gabapentin (NEURONTIN) 1,200 mg, oral    insulin lispro (HUMALOG) 1 Units, subcutaneous, 3 times daily with meals, Sliding scale    lamoTRIgine (LaMICtal) 100 mg tablet oral    lamoTRIgine (LAMICTAL) 50 mg, oral, Daily    lisinopril 5 mg, oral, Daily    montelukast (SINGULAIR) 10 mg, oral, Nightly    ondansetron (ZOFRAN) 8 mg, oral, Every 8 hours PRN    pantoprazole (ProtoNix) 40 mg EC tablet 1 tablet, oral, 2 times daily    spironolactone (ALDACTONE) 50 mg, oral, 2 times daily    spironolactone (ALDACTONE) 50 mg, oral, Daily    topiramate (TOPAMAX) 100 mg, oral, Daily    topiramate (TOPAMAX) 100 mg, oral, Daily    traMADol (Ultram) 50 mg tablet 1 tablet, oral, 3 times daily    Trulicity 1.5 mg, subcutaneous, Weekly    umeclidinium-vilanteroL (Anoro Ellipta) 62.5-25 mcg/actuation blister with device 1 puff, inhalation, Daily    valACYclovir (VALTREX) 500 mg, oral, Daily        Allergies   Allergen Reactions    Duloxetine Unknown    Pregabalin Unknown    Shellfish Derived Unknown        Physical Exam:  Right ear-external canal is patent.  Her PE tube is functioning normally.  She no longer has pus draining through the tube and there is no further infection today.  No granulation tissue.  Mastoid nontender.  Left ear-external canal is patent.  TM intact with good mobility.  No effusion.  Mastoid nontender.  Nose-clear no rhinorrhea    Results:  []    Procedure:  []    Marcin Montenegro, DO

## 2024-01-16 ENCOUNTER — HOSPITAL ENCOUNTER (OUTPATIENT)
Dept: RADIOLOGY | Facility: EXTERNAL LOCATION | Age: 49
Discharge: HOME | End: 2024-01-16

## 2024-01-17 ENCOUNTER — OFFICE VISIT (OUTPATIENT)
Dept: CARDIOLOGY | Facility: CLINIC | Age: 49
End: 2024-01-17
Payer: COMMERCIAL

## 2024-01-17 VITALS
HEIGHT: 63 IN | DIASTOLIC BLOOD PRESSURE: 60 MMHG | SYSTOLIC BLOOD PRESSURE: 102 MMHG | BODY MASS INDEX: 35.44 KG/M2 | HEART RATE: 70 BPM | WEIGHT: 200 LBS

## 2024-01-17 DIAGNOSIS — R61 NIGHT SWEATS: ICD-10-CM

## 2024-01-17 DIAGNOSIS — F17.200 CURRENT EVERY DAY SMOKER: ICD-10-CM

## 2024-01-17 DIAGNOSIS — R55 SYNCOPE AND COLLAPSE: ICD-10-CM

## 2024-01-17 DIAGNOSIS — I49.5 SINUS NODE DYSFUNCTION (MULTI): ICD-10-CM

## 2024-01-17 DIAGNOSIS — R00.1 BRADYCARDIA: ICD-10-CM

## 2024-01-17 DIAGNOSIS — R00.0 WIDE-COMPLEX TACHYCARDIA: Primary | ICD-10-CM

## 2024-01-17 DIAGNOSIS — I10 BENIGN ESSENTIAL HYPERTENSION: ICD-10-CM

## 2024-01-17 DIAGNOSIS — R00.2 PALPITATIONS: ICD-10-CM

## 2024-01-17 PROCEDURE — 3078F DIAST BP <80 MM HG: CPT | Performed by: INTERNAL MEDICINE

## 2024-01-17 PROCEDURE — 99215 OFFICE O/P EST HI 40 MIN: CPT | Performed by: INTERNAL MEDICINE

## 2024-01-17 PROCEDURE — 93000 ELECTROCARDIOGRAM COMPLETE: CPT | Performed by: INTERNAL MEDICINE

## 2024-01-17 PROCEDURE — 3074F SYST BP LT 130 MM HG: CPT | Performed by: INTERNAL MEDICINE

## 2024-01-17 PROCEDURE — 4010F ACE/ARB THERAPY RXD/TAKEN: CPT | Performed by: INTERNAL MEDICINE

## 2024-01-17 PROCEDURE — 3008F BODY MASS INDEX DOCD: CPT | Performed by: INTERNAL MEDICINE

## 2024-01-17 RX ORDER — CHLORHEXIDINE GLUCONATE 40 MG/ML
SOLUTION TOPICAL ONCE
Status: CANCELLED | OUTPATIENT
Start: 2024-01-17 | End: 2024-01-17

## 2024-01-17 RX ORDER — CEFAZOLIN SODIUM 2 G/100ML
2 INJECTION, SOLUTION INTRAVENOUS ONCE
Status: CANCELLED | OUTPATIENT
Start: 2024-01-17 | End: 2024-01-17

## 2024-01-17 RX ORDER — METOPROLOL TARTRATE 25 MG/1
25 TABLET, FILM COATED ORAL 2 TIMES DAILY
Qty: 180 TABLET | Refills: 3 | Status: SHIPPED | OUTPATIENT
Start: 2024-01-17 | End: 2025-01-16

## 2024-01-17 RX ORDER — MUPIROCIN 20 MG/G
1 OINTMENT TOPICAL ONCE
Status: CANCELLED | OUTPATIENT
Start: 2024-01-17 | End: 2024-01-17

## 2024-01-17 RX ORDER — LAMOTRIGINE 25 MG/1
50 TABLET ORAL DAILY
COMMUNITY

## 2024-01-17 RX ORDER — ACETAMINOPHEN 500 MG
1 TABLET ORAL ONCE
Qty: 1 KIT | Refills: 0 | Status: SHIPPED | OUTPATIENT
Start: 2024-01-17 | End: 2024-01-17

## 2024-01-17 RX ORDER — SODIUM CHLORIDE 9 MG/ML
100 INJECTION, SOLUTION INTRAVENOUS CONTINUOUS
Status: CANCELLED | OUTPATIENT
Start: 2024-01-17

## 2024-01-17 ASSESSMENT — ENCOUNTER SYMPTOMS
DYSPNEA ON EXERTION: 0
NIGHT SWEATS: 1
PALPITATIONS: 1

## 2024-01-17 NOTE — PATIENT INSTRUCTIONS
Continue same medications/treatment.  Patient educated on proper medication use.  Patient educated on risk factor modification.  Please bring any lab results from other providers/physicians to your next appointment.    Please bring all medicines, vitamins, and herbal supplements with you when you come to the office.    Prescriptions will not be filled unless you are compliant with your follow up appointments or have a follow up appointment scheduled as per instruction of your physician. Refills should be requested at the time of your visit.    SCHEDULE loop recorder implant. Obtain blood work a few days prior to procedure. Orders are in the system.   Follow up with Dr. Hudson in the next 2-3 weeks  START metoprolol tartrate 25mg twice daily.   Monitor your BP 2 hours after you take your medication in the AM and monitor your BP again in the evening.   Follow up with Dr. Jameson following the procedure     I, LESLY VICKERS RN, AM SCRIBING FOR, AND IN THE PRESENCE OF DR. JEFF JAMESON MD

## 2024-01-17 NOTE — H&P (VIEW-ONLY)
CARDIOLOGY OFFICE VISIT      CHIEF COMPLAINT  Chief Complaint   Patient presents with    Follow-up     Discuss loop alternative       HISTORY OF PRESENT ILLNESS  HPI    48-year-old female with a past medical history of cystic compulsive disorder and also history of Chiari malformation with brain surgery done approximately a year ago. Patient has been followed by gynecology service since August last year after patient was admitted to outside hospital. 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 Then she got some radiation into the right arm. Then she started noticing right arm and right lower extremity weakness and she went home with those symptoms. At home her blood pressure systolic was found to be in the 80s with heart rates in the 40s. She came to emergency department for an evaluation. During telemetry patient states that her heart rate was always bradycardic. She was referred for cardiology for an evaluation. She had an a stress test and echocardiogram and also a Holter monitor that were unremarkable.     Echocardiogram in February 2023 shows left ventricular ejection fraction of 60 to 65% with no significant valvular normalities. Holter monitor in December 2022 shows underlying rhythm of sinus rhythm with minimum heart rate 45 bpm maximal heart rate of 129 beats per hours heart of 64 bpm. There were brief episodes of nonsustained supraventricular tachycardia up to 4 beats of duration. Asymptomatic. No evidence of heart block or atrial fibrillation. Had a stress test in February 2023 shows no evidence of ischemia with a left ventricular ejection fraction of 69%.     Due to persisting of symptoms of palpitations chest discomfort and sometimes diaphoresis an event monitor was ordered in May 2023. Event monitor shows underlying rhythm was sinus rhythm. There were 23 triggered events with symptoms that they were not specified. 1  of these episodes were related with wide-complex tachycardia total of 9 beats that occurred on April 17, 2023 at 1 in the morning. rate of 173 bpm.     Based on the results of the event monitor a cardiac MRI was done that shows left ventricular ejection fraction of 62% with delayed enhancement normal. No significant valvular normalities with mild asymmetric left ventricular hypertrophy.    During the last visit, we discussed the option of loop recorder implantation.  Apparently for insurance did not cover her for this procedure.  Today she complains of multiple symptoms including palpitations diaphoresis dizziness lightheadedness chest discomfort and also some pain in the legs.  She has been followed by vascular surgery at [facility].    EKG performed today shows sinus rhythm at a rate of 70 bpm QRS duration 80 ms QT corrected 440 ms.  Rhythm strip shows the same pattern.        Past Medical History  Past Medical History:   Diagnosis Date    Other disorders of lung     Lung trouble    Personal history of other diseases of the digestive system     History of gastroesophageal reflux (GERD)    Personal history of other diseases of the musculoskeletal system and connective tissue     History of arthritis    Personal history of other diseases of the nervous system and sense organs     History of sleep apnea    Personal history of other diseases of the respiratory system     History of chronic obstructive lung disease    Personal history of other diseases of the respiratory system     History of bronchitis    Personal history of other diseases of the respiratory system     History of asthma    Personal history of other endocrine, nutritional and metabolic disease     History of diabetes mellitus    Personal history of other endocrine, nutritional and metabolic disease     History of thyroid disorder    Personal history of other mental and behavioral disorders     History of depression    Personal history of other  mental and behavioral disorders     History of mental disorder    Personal history of other specified conditions     History of snoring       Social History  Social History     Tobacco Use    Smoking status: Every Day     Packs/day: .5     Types: Cigarettes    Smokeless tobacco: Never   Substance Use Topics    Alcohol use: Not Currently    Drug use: Yes     Types: Marijuana       Family History     Family History   Problem Relation Name Age of Onset    No Known Problems Mother      No Known Problems Father          Allergies:  Allergies   Allergen Reactions    Duloxetine Unknown    Pregabalin Unknown    Shellfish Derived Unknown        Outpatient Medications:  Current Outpatient Medications   Medication Instructions    albuterol (Ventolin HFA) 90 mcg/actuation inhaler 2 puffs, inhalation, Every 6 hours PRN    atorvastatin (LIPITOR) 40 mg, oral, Nightly    blood sugar diagnostic (OneTouch Verio test strips) strip Test blood sugars 4 times a day as directed    Briviact 200 mg, oral, 2 times daily    buPROPion SR (Wellbutrin SR) 150 mg 12 hr tablet     cholecalciferol (Vitamin D-3) 125 MCG (5000 UT) capsule 1 capsule, oral, Weekly, Once weekly on friday    clotrimazole (Lotrimin) 1 % cream 1 Application, Topical, 2 times daily    diclofenac sodium (Voltaren) 1 % gel gel APPLY 4 INCHES OF MEDICATION TO EACH KNEE EVERY 4 HOURS AS NEEDED    ergocalciferol (VITAMIN D-2) 1,250 mcg, oral, Weekly    Fycompa 4 mg, oral, Nightly    gabapentin (NEURONTIN) 600 mg, oral, 3 times daily    insulin lispro (HUMALOG) 1 Units, subcutaneous, 3 times daily with meals, Sliding scale    lamoTRIgine (LAMICTAL) 50 mg, oral, Daily    lisinopril 5 mg, oral, Daily    montelukast (SINGULAIR) 10 mg, oral, Nightly    ondansetron (ZOFRAN) 8 mg, oral, Every 8 hours PRN    pantoprazole (ProtoNix) 40 mg EC tablet 1 tablet, oral, 2 times daily    spironolactone (ALDACTONE) 50 mg, oral, 2 times daily    topiramate (TOPAMAX) 100 mg, oral, Daily     Trulicity 1.5 mg, subcutaneous, Weekly    umeclidinium-vilanteroL (Anoro Ellipta) 62.5-25 mcg/actuation blister with device 1 puff, inhalation, Daily    valACYclovir (VALTREX) 500 mg, oral, Daily          REVIEW OF SYSTEMS  Review of Systems   Constitutional: Positive for night sweats.   Cardiovascular:  Positive for palpitations. Negative for chest pain and dyspnea on exertion.         VITALS  Vitals:    01/17/24 1227   BP: 102/60   Pulse: 70       PHYSICAL EXAM  Constitutional:       General: Awake.      Appearance: Normal and healthy appearance. Well-developed and not in distress.   Neck:      Vascular: No JVR. JVD normal.   Pulmonary:      Effort: Pulmonary effort is normal.      Breath sounds: Normal breath sounds. No wheezing. No rhonchi. No rales.   Chest:      Chest wall: Not tender to palpatation.   Cardiovascular:      PMI at left midclavicular line. Normal rate. Regular rhythm. Normal S1. Normal S2.       Murmurs: There is no murmur.      No gallop.  No click. No rub.   Pulses:     Intact distal pulses.   Edema:     Peripheral edema absent.   Abdominal:      Tenderness: There is no abdominal tenderness.   Musculoskeletal: Normal range of motion.         General: No tenderness. Skin:     General: Skin is warm and dry.   Neurological:      General: No focal deficit present.      Mental Status: Alert and oriented to person, place and time.           ASSESSMENT AND PLAN    Clinical impression     1. Evidence of bradycardia during admission in outside hospital.  2. Palpitations  3. Evidence of nonsustained ventricular tachycardia on event monitor  4. Normal left ventricular function per echocardiogram in 2023 and cardiac MRI in 2023  5. No evidence of ischemia per stress test in 2023  6. 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 Obsessive-compulsive disorder    Plan-recommendations    Patient still having multiple symptoms including palpitations and dizziness.  She has a history of bradycardia  and also wide-complex rhythm by event monitors in the past.  Patient needs to have long-term monitoring with a loop recorder implantation.Procedure, risk, benefits and possible complications were explained to patient.  All questions were answered.  Patient agrees with plan.      Patient also needs to cut the dose of caffeine and also caffeine drinks that she takes to try to counteract her episodes of hypotension.  She needs to talk to primary cardiology service for adjustment of medical therapy if needed.    Patient also was instructed to talk to vascular surgery regarding pain in her legs and questionable blood clots in her legs.    Follow my office 7 days postprocedure for wound assessment.    Risk factor modification and lifestyle modification discussed with patient. Diet , exercise and hydration discussed with patient.    I have personally review with patient during this office visit, laboratory data, echocardiogram results, stress test results, Holter-event monitor results prior and after the last electrophysiology visit. All questions has been answered.    Please excuse any errors in grammar or translation related to this dictation.  Voice recognition software was utilized to prepare this document.

## 2024-01-17 NOTE — PROGRESS NOTES
CARDIOLOGY OFFICE VISIT      CHIEF COMPLAINT  Chief Complaint   Patient presents with    Follow-up     Discuss loop alternative       HISTORY OF PRESENT ILLNESS  HPI    48-year-old female with a past medical history of cystic compulsive disorder and also history of Chiari malformation with brain surgery done approximately a year ago. Patient has been followed by gynecology service since August last year after patient was admitted to outside hospital. 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 Then she got some radiation into the right arm. Then she started noticing right arm and right lower extremity weakness and she went home with those symptoms. At home her blood pressure systolic was found to be in the 80s with heart rates in the 40s. She came to emergency department for an evaluation. During telemetry patient states that her heart rate was always bradycardic. She was referred for cardiology for an evaluation. She had an a stress test and echocardiogram and also a Holter monitor that were unremarkable.     Echocardiogram in February 2023 shows left ventricular ejection fraction of 60 to 65% with no significant valvular normalities. Holter monitor in December 2022 shows underlying rhythm of sinus rhythm with minimum heart rate 45 bpm maximal heart rate of 129 beats per hours heart of 64 bpm. There were brief episodes of nonsustained supraventricular tachycardia up to 4 beats of duration. Asymptomatic. No evidence of heart block or atrial fibrillation. Had a stress test in February 2023 shows no evidence of ischemia with a left ventricular ejection fraction of 69%.     Due to persisting of symptoms of palpitations chest discomfort and sometimes diaphoresis an event monitor was ordered in May 2023. Event monitor shows underlying rhythm was sinus rhythm. There were 23 triggered events with symptoms that they were not specified. 1  of these episodes were related with wide-complex tachycardia total of 9 beats that occurred on April 17, 2023 at 1 in the morning. rate of 173 bpm.     Based on the results of the event monitor a cardiac MRI was done that shows left ventricular ejection fraction of 62% with delayed enhancement normal. No significant valvular normalities with mild asymmetric left ventricular hypertrophy.    During the last visit, we discussed the option of loop recorder implantation.  Apparently for insurance did not cover her for this procedure.  Today she complains of multiple symptoms including palpitations diaphoresis dizziness lightheadedness chest discomfort and also some pain in the legs.  She has been followed by vascular surgery at [facility].    EKG performed today shows sinus rhythm at a rate of 70 bpm QRS duration 80 ms QT corrected 440 ms.  Rhythm strip shows the same pattern.        Past Medical History  Past Medical History:   Diagnosis Date    Other disorders of lung     Lung trouble    Personal history of other diseases of the digestive system     History of gastroesophageal reflux (GERD)    Personal history of other diseases of the musculoskeletal system and connective tissue     History of arthritis    Personal history of other diseases of the nervous system and sense organs     History of sleep apnea    Personal history of other diseases of the respiratory system     History of chronic obstructive lung disease    Personal history of other diseases of the respiratory system     History of bronchitis    Personal history of other diseases of the respiratory system     History of asthma    Personal history of other endocrine, nutritional and metabolic disease     History of diabetes mellitus    Personal history of other endocrine, nutritional and metabolic disease     History of thyroid disorder    Personal history of other mental and behavioral disorders     History of depression    Personal history of other  mental and behavioral disorders     History of mental disorder    Personal history of other specified conditions     History of snoring       Social History  Social History     Tobacco Use    Smoking status: Every Day     Packs/day: .5     Types: Cigarettes    Smokeless tobacco: Never   Substance Use Topics    Alcohol use: Not Currently    Drug use: Yes     Types: Marijuana       Family History     Family History   Problem Relation Name Age of Onset    No Known Problems Mother      No Known Problems Father          Allergies:  Allergies   Allergen Reactions    Duloxetine Unknown    Pregabalin Unknown    Shellfish Derived Unknown        Outpatient Medications:  Current Outpatient Medications   Medication Instructions    albuterol (Ventolin HFA) 90 mcg/actuation inhaler 2 puffs, inhalation, Every 6 hours PRN    atorvastatin (LIPITOR) 40 mg, oral, Nightly    blood sugar diagnostic (OneTouch Verio test strips) strip Test blood sugars 4 times a day as directed    Briviact 200 mg, oral, 2 times daily    buPROPion SR (Wellbutrin SR) 150 mg 12 hr tablet     cholecalciferol (Vitamin D-3) 125 MCG (5000 UT) capsule 1 capsule, oral, Weekly, Once weekly on friday    clotrimazole (Lotrimin) 1 % cream 1 Application, Topical, 2 times daily    diclofenac sodium (Voltaren) 1 % gel gel APPLY 4 INCHES OF MEDICATION TO EACH KNEE EVERY 4 HOURS AS NEEDED    ergocalciferol (VITAMIN D-2) 1,250 mcg, oral, Weekly    Fycompa 4 mg, oral, Nightly    gabapentin (NEURONTIN) 600 mg, oral, 3 times daily    insulin lispro (HUMALOG) 1 Units, subcutaneous, 3 times daily with meals, Sliding scale    lamoTRIgine (LAMICTAL) 50 mg, oral, Daily    lisinopril 5 mg, oral, Daily    montelukast (SINGULAIR) 10 mg, oral, Nightly    ondansetron (ZOFRAN) 8 mg, oral, Every 8 hours PRN    pantoprazole (ProtoNix) 40 mg EC tablet 1 tablet, oral, 2 times daily    spironolactone (ALDACTONE) 50 mg, oral, 2 times daily    topiramate (TOPAMAX) 100 mg, oral, Daily     Trulicity 1.5 mg, subcutaneous, Weekly    umeclidinium-vilanteroL (Anoro Ellipta) 62.5-25 mcg/actuation blister with device 1 puff, inhalation, Daily    valACYclovir (VALTREX) 500 mg, oral, Daily          REVIEW OF SYSTEMS  Review of Systems   Constitutional: Positive for night sweats.   Cardiovascular:  Positive for palpitations. Negative for chest pain and dyspnea on exertion.         VITALS  Vitals:    01/17/24 1227   BP: 102/60   Pulse: 70       PHYSICAL EXAM  Constitutional:       General: Awake.      Appearance: Normal and healthy appearance. Well-developed and not in distress.   Neck:      Vascular: No JVR. JVD normal.   Pulmonary:      Effort: Pulmonary effort is normal.      Breath sounds: Normal breath sounds. No wheezing. No rhonchi. No rales.   Chest:      Chest wall: Not tender to palpatation.   Cardiovascular:      PMI at left midclavicular line. Normal rate. Regular rhythm. Normal S1. Normal S2.       Murmurs: There is no murmur.      No gallop.  No click. No rub.   Pulses:     Intact distal pulses.   Edema:     Peripheral edema absent.   Abdominal:      Tenderness: There is no abdominal tenderness.   Musculoskeletal: Normal range of motion.         General: No tenderness. Skin:     General: Skin is warm and dry.   Neurological:      General: No focal deficit present.      Mental Status: Alert and oriented to person, place and time.           ASSESSMENT AND PLAN    Clinical impression     1. Evidence of bradycardia during admission in outside hospital.  2. Palpitations  3. Evidence of nonsustained ventricular tachycardia on event monitor  4. Normal left ventricular function per echocardiogram in 2023 and cardiac MRI in 2023  5. No evidence of ischemia per stress test in 2023  6. 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 Obsessive-compulsive disorder    Plan-recommendations    Patient still having multiple symptoms including palpitations and dizziness.  She has a history of bradycardia  and also wide-complex rhythm by event monitors in the past.  Patient needs to have long-term monitoring with a loop recorder implantation.Procedure, risk, benefits and possible complications were explained to patient.  All questions were answered.  Patient agrees with plan.      Patient also needs to cut the dose of caffeine and also caffeine drinks that she takes to try to counteract her episodes of hypotension.  She needs to talk to primary cardiology service for adjustment of medical therapy if needed.    Patient also was instructed to talk to vascular surgery regarding pain in her legs and questionable blood clots in her legs.    Follow my office 7 days postprocedure for wound assessment.    Risk factor modification and lifestyle modification discussed with patient. Diet , exercise and hydration discussed with patient.    I have personally review with patient during this office visit, laboratory data, echocardiogram results, stress test results, Holter-event monitor results prior and after the last electrophysiology visit. All questions has been answered.    Please excuse any errors in grammar or translation related to this dictation.  Voice recognition software was utilized to prepare this document.

## 2024-01-18 ENCOUNTER — OFFICE VISIT (OUTPATIENT)
Dept: ORTHOPEDIC SURGERY | Facility: CLINIC | Age: 49
End: 2024-01-18
Payer: COMMERCIAL

## 2024-01-18 DIAGNOSIS — M17.0 PRIMARY OSTEOARTHRITIS OF BOTH KNEES: Primary | ICD-10-CM

## 2024-01-18 PROBLEM — R00.0 WIDE-COMPLEX TACHYCARDIA: Status: ACTIVE | Noted: 2024-01-17

## 2024-01-18 PROBLEM — R00.2 PALPITATIONS: Status: ACTIVE | Noted: 2024-01-17

## 2024-01-18 PROBLEM — R61 NIGHT SWEATS: Status: ACTIVE | Noted: 2024-01-17

## 2024-01-18 PROCEDURE — 99214 OFFICE O/P EST MOD 30 MIN: CPT | Performed by: STUDENT IN AN ORGANIZED HEALTH CARE EDUCATION/TRAINING PROGRAM

## 2024-01-18 PROCEDURE — 2500000005 HC RX 250 GENERAL PHARMACY W/O HCPCS: Performed by: STUDENT IN AN ORGANIZED HEALTH CARE EDUCATION/TRAINING PROGRAM

## 2024-01-18 PROCEDURE — 2500000004 HC RX 250 GENERAL PHARMACY W/ HCPCS (ALT 636 FOR OP/ED): Performed by: STUDENT IN AN ORGANIZED HEALTH CARE EDUCATION/TRAINING PROGRAM

## 2024-01-18 PROCEDURE — 99204 OFFICE O/P NEW MOD 45 MIN: CPT | Performed by: STUDENT IN AN ORGANIZED HEALTH CARE EDUCATION/TRAINING PROGRAM

## 2024-01-18 PROCEDURE — 4010F ACE/ARB THERAPY RXD/TAKEN: CPT | Performed by: STUDENT IN AN ORGANIZED HEALTH CARE EDUCATION/TRAINING PROGRAM

## 2024-01-18 PROCEDURE — 20610 DRAIN/INJ JOINT/BURSA W/O US: CPT | Performed by: STUDENT IN AN ORGANIZED HEALTH CARE EDUCATION/TRAINING PROGRAM

## 2024-01-18 PROCEDURE — 3008F BODY MASS INDEX DOCD: CPT | Performed by: STUDENT IN AN ORGANIZED HEALTH CARE EDUCATION/TRAINING PROGRAM

## 2024-01-18 RX ORDER — LIDOCAINE HYDROCHLORIDE 20 MG/ML
2 INJECTION, SOLUTION INFILTRATION; PERINEURAL
Status: COMPLETED | OUTPATIENT
Start: 2024-01-18 | End: 2024-01-18

## 2024-01-18 RX ORDER — TRIAMCINOLONE ACETONIDE 40 MG/ML
2.5 INJECTION, SUSPENSION INTRA-ARTICULAR; INTRAMUSCULAR
Status: COMPLETED | OUTPATIENT
Start: 2024-01-18 | End: 2024-01-18

## 2024-01-18 RX ADMIN — LIDOCAINE HYDROCHLORIDE 2 ML: 20 INJECTION, SOLUTION INFILTRATION; PERINEURAL at 11:01

## 2024-01-18 RX ADMIN — TRIAMCINOLONE ACETONIDE 2.5 MG: 40 INJECTION, SUSPENSION INTRA-ARTICULAR; INTRAMUSCULAR at 11:01

## 2024-01-18 NOTE — PROGRESS NOTES
Orthopaedic Surgery  New Patient Clinic Note    Ruth Marin [id number] January 18, 2024    Reason for Consult: Left knee osteoarthritis    HPI: This is a pleasant 48-year-old female with multiple medical comorbidities at baseline including diabetes, obesity, syringomyelia, multiple GI issues, Cushing's, as well as significant bilateral peripheral vascular disease with decreased ABIs bilaterally.  She has had multiple years of left greater than right knee pain.  She is currently trying a brace.  She is never done physical therapy.  She is never tried injections.  She states that she was interested in getting a knee injection about 4 years ago but put it off.  She seen another orthopedic surgeon closer to her home who said she was too high risk for any surgery.  She is here to discuss total knee replacement surgery.  She is also had recently an arrhythmia and had a loop monitor placed or is at least pending to and is also recently seen her vascular surgeon team for preoperative total knee replacement clearance.  She does occasionally take NSAIDs and states that that does seem to help with the pain.    ROS:  15 point review of systems collected per intake sheet and negative except for as noted in HPI.    PMH:   Past Medical History:   Diagnosis Date    Other disorders of lung     Lung trouble    Personal history of other diseases of the digestive system     History of gastroesophageal reflux (GERD)    Personal history of other diseases of the musculoskeletal system and connective tissue     History of arthritis    Personal history of other diseases of the nervous system and sense organs     History of sleep apnea    Personal history of other diseases of the respiratory system     History of chronic obstructive lung disease    Personal history of other diseases of the respiratory system     History of bronchitis    Personal history of other diseases of the respiratory system     History of asthma    Personal history  of other endocrine, nutritional and metabolic disease     History of diabetes mellitus    Personal history of other endocrine, nutritional and metabolic disease     History of thyroid disorder    Personal history of other mental and behavioral disorders     History of depression    Personal history of other mental and behavioral disorders     History of mental disorder    Personal history of other specified conditions     History of snoring    No history of DVT.     PSH:    Past Surgical History:   Procedure Laterality Date    BRAIN SURGERY      GALLBLADDER SURGERY  2017    Gallbladder Surgery    TONSILLECTOMY  2017    Tonsillectomy With Adenoidectomy        SHx: No smoking. No IVDU    Meds:   Current Outpatient Medications on File Prior to Visit   Medication Sig Dispense Refill    albuterol (Ventolin HFA) 90 mcg/actuation inhaler Inhale 2 puffs every 6 hours if needed.      atorvastatin (Lipitor) 40 mg tablet TAKE 1 TABLET BY MOUTH EVERYDAY AT BEDTIME 90 tablet 3    [] blood pressure monitor kit 1 kit 1 time for 1 dose. 1 kit 0    blood sugar diagnostic (OneTouch Verio test strips) strip Test blood sugars 4 times a day as directed      Briviact 100 mg tablet tablet TAKE 2 TABLETS BY MOUTH TWICE A  tablet 5    buPROPion SR (Wellbutrin SR) 150 mg 12 hr tablet       cholecalciferol (Vitamin D-3) 125 MCG (5000 UT) capsule Take 1 capsule (125 mcg) by mouth 1 (one) time per week. Once weekly on friday      clotrimazole (Lotrimin) 1 % cream Apply 1 Application topically 2 times a day.      diclofenac sodium (Voltaren) 1 % gel gel APPLY 4 INCHES OF MEDICATION TO EACH KNEE EVERY 4 HOURS AS NEEDED      dulaglutide (Trulicity) 1.5 mg/0.5 mL pen injector injection Inject 1.5 mg under the skin 1 (one) time per week. (Patient taking differently: Inject 1.5 mg under the skin 1 (one) time per week. Patient states  Put on hold.) 2 mL 11    ergocalciferol (Vitamin D-2) 1.25 MG (50526 UT) capsule TAKE 1  CAPSULE BY MOUTH ONE TIME PER WEEK 4 capsule 4    gabapentin (Neurontin) 600 mg tablet Take 1 tablet (600 mg) by mouth 3 times a day.      lamoTRIgine (LaMICtal) 25 mg tablet Take 2 tablets (50 mg) by mouth once daily.      lisinopril 5 mg tablet TAKE 1 TABLET BY MOUTH EVERY DAY 30 tablet 3    metoprolol tartrate (Lopressor) 25 mg tablet Take 1 tablet (25 mg) by mouth 2 times a day. 180 tablet 3    montelukast (Singulair) 10 mg tablet Take 1 tablet (10 mg) by mouth once daily at bedtime.      ondansetron (Zofran) 8 mg tablet Take 1 tablet (8 mg) by mouth every 8 hours if needed for nausea.      pantoprazole (ProtoNix) 40 mg EC tablet Take 1 tablet (40 mg) by mouth 2 times a day.      perampaneL (Fycompa) 4 mg tablet TAKE 1 TABLET BY MOUTH EVERYDAY AT BEDTIME 30 tablet 5    topiramate (Topamax) 25 mg tablet Take 4 tablets (100 mg) by mouth once daily.      umeclidinium-vilanteroL (Anoro Ellipta) 62.5-25 mcg/actuation blister with device Inhale 1 puff once daily.      valACYclovir (Valtrex) 500 mg tablet Take 1 tablet (500 mg) by mouth once daily.      [DISCONTINUED] aspirin 81 mg EC tablet Take 1 tablet (81 mg) by mouth once daily.      [DISCONTINUED] clotrimazole-betamethasone (Lotrisone) cream       [DISCONTINUED] cyclobenzaprine (Flexeril) 10 mg tablet Take 1 tablet (10 mg) by mouth 3 times a day as needed for muscle spasms.      [DISCONTINUED] fenofibrate (Tricor) 145 mg tablet Take 1 tablet (145 mg) by mouth once daily.      [DISCONTINUED] insulin lispro (HumaLOG) 100 unit/mL injection Inject 1 Units under the skin 3 times a day with meals. Sliding scale 3 mL 11    [DISCONTINUED] lamoTRIgine (LaMICtal) 100 mg tablet Take by mouth.      [DISCONTINUED] lamoTRIgine (LaMICtal) 25 mg tablet Take 2 tablets (50 mg) by mouth once daily.      [DISCONTINUED] spironolactone (Aldactone) 50 mg tablet TAKE 1 TABLET BY MOUTH TWICE A DAY 60 tablet 3    [DISCONTINUED] spironolactone (Aldactone) 50 mg tablet Take 1 tablet (50 mg)  by mouth once daily.      [DISCONTINUED] topiramate (Topamax) 100 mg tablet Take 1 tablet (100 mg) by mouth once daily.      [DISCONTINUED] traMADol (Ultram) 50 mg tablet Take 1 tablet (50 mg) by mouth 3 times a day.       No current facility-administered medications on file prior to visit.       PHYSICAL EXAM    GEN: AaOx4, NAD  HEENT: normocephalic atraumatic, EOMI, MMM, pupils equal and round  PSYCH: appropriate mood and affect  RESP: nonlabored breathing   CARDIAC: Extremities WWP, RRR to peripheral palpation  NEURO: CN 2-12 grossly intact  SKIN: Atraumatic    Physical exam of the left lower extremity reveals no obvious knee joint effusion.  She has mild medial joint line pain but no lateral joint line pain.  Knee is stable to varus valgus stress.  She has a stable Lachman.  Sensation is intact light touch in all distributions.  She has palpable pulses and brisk capillary fill distally.  She has full knee flexion extension with no evidence of crepitation.  She does have palpable pulses although they are noticeably diminished.    Imaging:    XR of the bilateral knee, obtained and personally reviewed today, shows moderate tricompartmental osteoarthritis bilaterally with joint space narrowing as well as peripheral osteophyte formation.    Patient ID: Ruth Marin is a 48 y.o. female.    L Inj/Asp: L knee on 1/18/2024 11:01 AM  Indications: pain  Details: 22 G needle, anterolateral approach  Medications: 2 mL lidocaine 20 mg/mL (2 %); 2.5 mg triamcinolone acetonide 40 mg/mL  Outcome: tolerated well, no immediate complications  Procedure, treatment alternatives, risks and benefits explained, specific risks discussed. Consent was given by the patient. Immediately prior to procedure a time out was called to verify the correct patient, procedure, equipment, support staff and site/side marked as required. Patient was prepped and draped in the usual sterile fashion.       Assessment:    48-year-old female with left  knee osteoarthritis    Plan:    I discussed with the patient that she certainly does have a higher risk profile than others in regards to total knee arthroplasty.  She is very young and although she does have diabetes at baseline her hemoglobin A1c has been optimized and her weight is definitely coming down.  She does have multiple medical comorbidities and my largest concerning 1 would be smoking as well as her peripheral vascular disease.  I told her it would be a absolute necessity for her to completely stop smoking before elective surgery would be entertained.  Additionally I told her that it may be worth seeing if her vascular surgeons would entertain any sort of procedure in order to optimize her wound healing potential in the lower extremities before proceeding to elective total knee arthroplasty.  I also think it is worthwhile to try some conservative measures as she has yet to try any formal therapy or injections.  For that reason we gave her a PT prescription today as well as an intra-articular knee injection.  Ultimately should give us a call once she is completely quit smoking and wants from a vascular disease standpoint she is more optimized if she wants to consider total knee arthroplasty in the future.

## 2024-01-22 ENCOUNTER — TELEPHONE (OUTPATIENT)
Dept: CARDIOLOGY | Facility: CLINIC | Age: 49
End: 2024-01-22
Payer: COMMERCIAL

## 2024-01-22 NOTE — TELEPHONE ENCOUNTER
Provide a ride form filled out and signed by Dr. Monson. Placed to be mailed back to provide a ride.

## 2024-01-23 ENCOUNTER — HOSPITAL ENCOUNTER (OUTPATIENT)
Dept: CARDIOLOGY | Facility: HOSPITAL | Age: 49
Discharge: HOME | End: 2024-01-23
Payer: COMMERCIAL

## 2024-01-23 DIAGNOSIS — R00.0 WIDE-COMPLEX TACHYCARDIA: ICD-10-CM

## 2024-01-23 PROCEDURE — 93005 ELECTROCARDIOGRAM TRACING: CPT

## 2024-01-23 PROCEDURE — 93010 ELECTROCARDIOGRAM REPORT: CPT | Performed by: INTERNAL MEDICINE

## 2024-01-24 LAB
ATRIAL RATE: 55 BPM
P AXIS: 49 DEGREES
P OFFSET: 208 MS
P ONSET: 157 MS
PR INTERVAL: 126 MS
Q ONSET: 220 MS
QRS COUNT: 9 BEATS
QRS DURATION: 82 MS
QT INTERVAL: 438 MS
QTC CALCULATION(BAZETT): 419 MS
QTC FREDERICIA: 425 MS
R AXIS: 66 DEGREES
T AXIS: 35 DEGREES
T OFFSET: 439 MS
VENTRICULAR RATE: 55 BPM

## 2024-01-29 ENCOUNTER — TELEPHONE (OUTPATIENT)
Dept: CARDIOLOGY | Facility: HOSPITAL | Age: 49
End: 2024-01-29

## 2024-01-29 RX ORDER — SPIRONOLACTONE 50 MG/1
50 TABLET, FILM COATED ORAL 2 TIMES DAILY
COMMUNITY
End: 2024-04-03 | Stop reason: ALTCHOICE

## 2024-02-02 ENCOUNTER — APPOINTMENT (OUTPATIENT)
Dept: CARDIOLOGY | Facility: HOSPITAL | Age: 49
End: 2024-02-02
Payer: COMMERCIAL

## 2024-02-02 ENCOUNTER — HOSPITAL ENCOUNTER (OUTPATIENT)
Facility: HOSPITAL | Age: 49
Setting detail: OUTPATIENT SURGERY
Discharge: HOME | End: 2024-02-02
Attending: INTERNAL MEDICINE | Admitting: INTERNAL MEDICINE
Payer: COMMERCIAL

## 2024-02-02 VITALS
BODY MASS INDEX: 35.27 KG/M2 | RESPIRATION RATE: 17 BRPM | TEMPERATURE: 97.3 F | SYSTOLIC BLOOD PRESSURE: 134 MMHG | OXYGEN SATURATION: 94 % | DIASTOLIC BLOOD PRESSURE: 54 MMHG | WEIGHT: 199.08 LBS | HEIGHT: 63 IN | HEART RATE: 55 BPM

## 2024-02-02 DIAGNOSIS — R55 SYNCOPE AND COLLAPSE: ICD-10-CM

## 2024-02-02 DIAGNOSIS — R00.2 PALPITATIONS: ICD-10-CM

## 2024-02-02 DIAGNOSIS — R61 NIGHT SWEATS: ICD-10-CM

## 2024-02-02 DIAGNOSIS — Z95.818 PRESENCE OF OTHER CARDIAC IMPLANTS AND GRAFTS: Primary | ICD-10-CM

## 2024-02-02 DIAGNOSIS — N92.6 IRREGULAR MENSES: ICD-10-CM

## 2024-02-02 DIAGNOSIS — R00.0 WIDE-COMPLEX TACHYCARDIA: Primary | ICD-10-CM

## 2024-02-02 LAB
ANION GAP SERPL CALC-SCNC: 16 MMOL/L (ref 10–20)
APTT PPP: 32 SECONDS (ref 27–38)
ATRIAL RATE: 53 BPM
BUN SERPL-MCNC: 15 MG/DL (ref 6–23)
CALCIUM SERPL-MCNC: 9 MG/DL (ref 8.6–10.3)
CHLORIDE SERPL-SCNC: 107 MMOL/L (ref 98–107)
CO2 SERPL-SCNC: 21 MMOL/L (ref 21–32)
CREAT SERPL-MCNC: 0.79 MG/DL (ref 0.5–1.05)
EGFRCR SERPLBLD CKD-EPI 2021: >90 ML/MIN/1.73M*2
ERYTHROCYTE [DISTWIDTH] IN BLOOD BY AUTOMATED COUNT: 13.4 % (ref 11.5–14.5)
GLUCOSE SERPL-MCNC: 126 MG/DL (ref 74–99)
HCT VFR BLD AUTO: 43.9 % (ref 36–46)
HGB BLD-MCNC: 15.3 G/DL (ref 12–16)
INR PPP: 1.1 (ref 0.9–1.1)
MCH RBC QN AUTO: 35.1 PG (ref 26–34)
MCHC RBC AUTO-ENTMCNC: 34.9 G/DL (ref 32–36)
MCV RBC AUTO: 101 FL (ref 80–100)
NRBC BLD-RTO: 0 /100 WBCS (ref 0–0)
P AXIS: 57 DEGREES
P OFFSET: 202 MS
P ONSET: 150 MS
PLATELET # BLD AUTO: 244 X10*3/UL (ref 150–450)
POTASSIUM SERPL-SCNC: 3.8 MMOL/L (ref 3.5–5.3)
PR INTERVAL: 136 MS
PROTHROMBIN TIME: 12.1 SECONDS (ref 9.8–12.8)
Q ONSET: 218 MS
QRS COUNT: 9 BEATS
QRS DURATION: 88 MS
QT INTERVAL: 430 MS
QTC CALCULATION(BAZETT): 403 MS
QTC FREDERICIA: 412 MS
R AXIS: 53 DEGREES
RBC # BLD AUTO: 4.36 X10*6/UL (ref 4–5.2)
SODIUM SERPL-SCNC: 140 MMOL/L (ref 136–145)
T AXIS: 31 DEGREES
T OFFSET: 433 MS
VENTRICULAR RATE: 53 BPM
WBC # BLD AUTO: 11.3 X10*3/UL (ref 4.4–11.3)

## 2024-02-02 PROCEDURE — 82374 ASSAY BLOOD CARBON DIOXIDE: CPT | Performed by: NURSE PRACTITIONER

## 2024-02-02 PROCEDURE — 33285 INSJ SUBQ CAR RHYTHM MNTR: CPT | Performed by: INTERNAL MEDICINE

## 2024-02-02 PROCEDURE — 85027 COMPLETE CBC AUTOMATED: CPT | Performed by: NURSE PRACTITIONER

## 2024-02-02 PROCEDURE — 7100000009 HC PHASE TWO TIME - INITIAL BASE CHARGE: Performed by: INTERNAL MEDICINE

## 2024-02-02 PROCEDURE — C1764 EVENT RECORDER, CARDIAC: HCPCS | Performed by: INTERNAL MEDICINE

## 2024-02-02 PROCEDURE — 2500000004 HC RX 250 GENERAL PHARMACY W/ HCPCS (ALT 636 FOR OP/ED): Performed by: INTERNAL MEDICINE

## 2024-02-02 PROCEDURE — 2780000003 HC OR 278 NO HCPCS: Performed by: INTERNAL MEDICINE

## 2024-02-02 PROCEDURE — 85610 PROTHROMBIN TIME: CPT | Performed by: NURSE PRACTITIONER

## 2024-02-02 PROCEDURE — 93005 ELECTROCARDIOGRAM TRACING: CPT | Mod: 59

## 2024-02-02 PROCEDURE — 7100000010 HC PHASE TWO TIME - EACH INCREMENTAL 1 MINUTE: Performed by: INTERNAL MEDICINE

## 2024-02-02 PROCEDURE — 2500000005 HC RX 250 GENERAL PHARMACY W/O HCPCS: Performed by: INTERNAL MEDICINE

## 2024-02-02 PROCEDURE — 36415 COLL VENOUS BLD VENIPUNCTURE: CPT | Performed by: NURSE PRACTITIONER

## 2024-02-02 PROCEDURE — 93010 ELECTROCARDIOGRAM REPORT: CPT | Performed by: INTERNAL MEDICINE

## 2024-02-02 DEVICE — ICM LNQ22 LINQ II USA
Type: IMPLANTABLE DEVICE | Site: CHEST | Status: FUNCTIONAL
Brand: LINQ II™

## 2024-02-02 RX ORDER — SODIUM CHLORIDE 9 MG/ML
100 INJECTION, SOLUTION INTRAVENOUS CONTINUOUS
Status: DISCONTINUED | OUTPATIENT
Start: 2024-02-02 | End: 2024-02-02 | Stop reason: HOSPADM

## 2024-02-02 RX ORDER — ONDANSETRON HYDROCHLORIDE 2 MG/ML
4 INJECTION, SOLUTION INTRAVENOUS EVERY 8 HOURS PRN
Status: DISCONTINUED | OUTPATIENT
Start: 2024-02-02 | End: 2024-02-02 | Stop reason: HOSPADM

## 2024-02-02 RX ORDER — ACETAMINOPHEN 325 MG/1
650 TABLET ORAL EVERY 4 HOURS PRN
Status: DISCONTINUED | OUTPATIENT
Start: 2024-02-02 | End: 2024-02-02 | Stop reason: HOSPADM

## 2024-02-02 RX ORDER — MUPIROCIN 20 MG/G
1 OINTMENT TOPICAL ONCE
Status: DISCONTINUED | OUTPATIENT
Start: 2024-02-02 | End: 2024-02-02 | Stop reason: HOSPADM

## 2024-02-02 RX ORDER — LIDOCAINE HYDROCHLORIDE 10 MG/ML
INJECTION, SOLUTION EPIDURAL; INFILTRATION; INTRACAUDAL; PERINEURAL AS NEEDED
Status: DISCONTINUED | OUTPATIENT
Start: 2024-02-02 | End: 2024-02-02 | Stop reason: HOSPADM

## 2024-02-02 RX ORDER — CHLORHEXIDINE GLUCONATE 40 MG/ML
SOLUTION TOPICAL ONCE
Status: DISCONTINUED | OUTPATIENT
Start: 2024-02-02 | End: 2024-02-02 | Stop reason: HOSPADM

## 2024-02-02 RX ORDER — CEFAZOLIN SODIUM 2 G/50ML
2 SOLUTION INTRAVENOUS ONCE
Status: COMPLETED | OUTPATIENT
Start: 2024-02-02 | End: 2024-02-02

## 2024-02-02 RX ADMIN — CEFAZOLIN SODIUM 2 G: 2 SOLUTION INTRAVENOUS at 05:45

## 2024-02-02 ASSESSMENT — COLUMBIA-SUICIDE SEVERITY RATING SCALE - C-SSRS
6. HAVE YOU EVER DONE ANYTHING, STARTED TO DO ANYTHING, OR PREPARED TO DO ANYTHING TO END YOUR LIFE?: NO
1. IN THE PAST MONTH, HAVE YOU WISHED YOU WERE DEAD OR WISHED YOU COULD GO TO SLEEP AND NOT WAKE UP?: NO
2. HAVE YOU ACTUALLY HAD ANY THOUGHTS OF KILLING YOURSELF?: NO

## 2024-02-02 NOTE — Clinical Note
Patient ID band present and verified. Patient contact is in waiting room. Contact(s) present: father.

## 2024-02-02 NOTE — DISCHARGE INSTRUCTIONS
Home going instructions after a loop recorder implant     After a procedure using sedation  You should return home and rest for the remainder of the day and evening.   It is recommended a responsible adult be with you for the first 24 hours after the procedure.  Do not make any legal decisions for 24 hours after your procedure.  Do not drink alcoholic beverages for 24 hours after your procedure.    Wound care  Leave the surgical dressing in place for 7 days post implant  The dressing will be removed at the 1 week follow up appointment.    The dressing is water resistant.    You may shower and avoid water directly hitting the dressing.     Inspect your incisional site/ dressing each day  It is normal for the area around the incision to be tender for a few weeks following surgery.    Apply ice to the site 3-4 times per day in 20 minute intervals for at least 2 days after surgery.    Pain relievers such as Tylenol or Motrin are usually sufficient for pain relief.      Activity  Avoid driving for 24 hours  If you have had passing out spells or previously restricted from driving, discuss driving restrictions with your doctor.    Report to your physician    Increased redness, swelling, drainage or gaping of your incisional site  Increased pain at site unrelieved by pain medication  Fever or chills prior to the 1 week appointment  Bright red bleeding from the incisional site or complete saturation of the dressing  Dizziness, lightheadedness, or passing out    Remote monitoring/ Device ID card  You have been instructed by the device company representative regarding remote home monitoring.   There are multiple types of home monitoring units, please follow the instructions given  If you have questions please contact the device clinic for further instruction  After implant you will receive a temporary card.    Permanent card will come in the mail in the next few weeks.  It is important that you carry your ID card with you  at all times.      Follow up appointments  Incision (wound) check in 1 week  Appointment for device check and provider follow up in the next 4 months   These appointments will be scheduled and appear on your after visit summary

## 2024-02-07 ENCOUNTER — OFFICE VISIT (OUTPATIENT)
Dept: CARDIOLOGY | Facility: CLINIC | Age: 49
End: 2024-02-07
Payer: COMMERCIAL

## 2024-02-07 VITALS
SYSTOLIC BLOOD PRESSURE: 126 MMHG | HEART RATE: 60 BPM | BODY MASS INDEX: 35.25 KG/M2 | DIASTOLIC BLOOD PRESSURE: 76 MMHG | WEIGHT: 199 LBS

## 2024-02-07 DIAGNOSIS — R09.89 BRUIT OF RIGHT CAROTID ARTERY: ICD-10-CM

## 2024-02-07 DIAGNOSIS — J44.89 COPD WITH ASTHMA (MULTI): ICD-10-CM

## 2024-02-07 DIAGNOSIS — F17.200 CURRENT EVERY DAY SMOKER: ICD-10-CM

## 2024-02-07 DIAGNOSIS — R00.0 WIDE-COMPLEX TACHYCARDIA: ICD-10-CM

## 2024-02-07 DIAGNOSIS — I74.09 AORTOILIAC OCCLUSIVE DISEASE (MULTI): ICD-10-CM

## 2024-02-07 DIAGNOSIS — E11.9 DIABETES MELLITUS TYPE II, NON INSULIN DEPENDENT (MULTI): ICD-10-CM

## 2024-02-07 DIAGNOSIS — E78.5 HYPERLIPIDEMIA LDL GOAL <100: ICD-10-CM

## 2024-02-07 DIAGNOSIS — R06.02 SHORTNESS OF BREATH: ICD-10-CM

## 2024-02-07 DIAGNOSIS — E78.00 HYPERCHOLESTEROLEMIA: ICD-10-CM

## 2024-02-07 DIAGNOSIS — G47.33 OSA ON CPAP: ICD-10-CM

## 2024-02-07 DIAGNOSIS — R07.9 CHEST PAIN, UNSPECIFIED TYPE: ICD-10-CM

## 2024-02-07 DIAGNOSIS — I10 HTN (HYPERTENSION), BENIGN: ICD-10-CM

## 2024-02-07 DIAGNOSIS — Z95.818 STATUS POST PLACEMENT OF IMPLANTABLE LOOP RECORDER: ICD-10-CM

## 2024-02-07 DIAGNOSIS — Q07.00 ARNOLD-CHIARI SYNDROME WITHOUT SPINA BIFIDA OR HYDROCEPHALUS (MULTI): ICD-10-CM

## 2024-02-07 DIAGNOSIS — E24.0 CUSHING'S DISEASE (MULTI): ICD-10-CM

## 2024-02-07 DIAGNOSIS — R00.2 PALPITATIONS: ICD-10-CM

## 2024-02-07 DIAGNOSIS — I70.213 ATHEROSCLEROSIS OF NATIVE ARTERY OF BOTH LOWER EXTREMITIES WITH INTERMITTENT CLAUDICATION (CMS-HCC): ICD-10-CM

## 2024-02-07 DIAGNOSIS — I49.5 SINUS NODE DYSFUNCTION (MULTI): ICD-10-CM

## 2024-02-07 PROCEDURE — 4010F ACE/ARB THERAPY RXD/TAKEN: CPT | Performed by: INTERNAL MEDICINE

## 2024-02-07 PROCEDURE — 3074F SYST BP LT 130 MM HG: CPT | Performed by: INTERNAL MEDICINE

## 2024-02-07 PROCEDURE — 3078F DIAST BP <80 MM HG: CPT | Performed by: INTERNAL MEDICINE

## 2024-02-07 PROCEDURE — 3008F BODY MASS INDEX DOCD: CPT | Performed by: INTERNAL MEDICINE

## 2024-02-07 PROCEDURE — 99214 OFFICE O/P EST MOD 30 MIN: CPT | Performed by: INTERNAL MEDICINE

## 2024-02-07 ASSESSMENT — ENCOUNTER SYMPTOMS
SHORTNESS OF BREATH: 1
PALPITATIONS: 1

## 2024-02-07 NOTE — PROGRESS NOTES
CARDIOLOGY OFFICE VISIT      CHIEF COMPLAINT      HISTORY OF PRESENT ILLNESS  The patient has been having some problems with chest discomfort and dyspnea with exertion.  Her symptoms sound suspicious for myocardial ischemia.  It sometimes difficult to get a good history on her.  Because of her past history of significant peripheral arterial disease I am going to obtain a CT coronary angiogram to further evaluate.  She denies any significant palpitations or syncope.  She does have a loop recorder in by Dr. LONG.  She denies any problem with her current medication.    Impression:  Chest pain, with walking and shortness of breath- will proceed with CCTA   History of nonsustained ventricular tachycardia on event monitor, Dr. Monson  Loop Recorder in place, Dr. Monson managing   Normal left ventricular systolic function by echocardiogram 2023  No evidence of myocardial ischemia per stress test 2023   Obesity  History of bronchial asthma  History of some sort of atherosclerotic disease of left iliac artery, being followed by vascular at [facility]  Arnold-Chiari Syndrome  Cushing Syndrome     Past Medical History  Past Medical History:   Diagnosis Date    Asthma     Current smoker     Diabetes mellitus (CMS/HCC)     Disease of thyroid gland     History of Chiari malformation     Hyperlipidemia     Hypertension     OCD (obsessive compulsive disorder)     Other disorders of lung     Lung trouble    Personal history of other diseases of the digestive system     History of gastroesophageal reflux (GERD)    Personal history of other diseases of the musculoskeletal system and connective tissue     History of arthritis    Personal history of other diseases of the nervous system and sense organs     History of sleep apnea    Personal history of other diseases of the respiratory system     History of chronic obstructive lung disease    Personal history of other diseases of the respiratory system     History of bronchitis    Personal history of  other diseases of the respiratory system     History of asthma    Personal history of other endocrine, nutritional and metabolic disease     History of diabetes mellitus    Personal history of other endocrine, nutritional and metabolic disease     History of thyroid disorder    Personal history of other mental and behavioral disorders     History of depression    Personal history of other mental and behavioral disorders     History of mental disorder    Personal history of other specified conditions     History of snoring       Social History  Social History     Tobacco Use    Smoking status: Every Day     Packs/day: .5     Types: Cigarettes    Smokeless tobacco: Never   Substance Use Topics    Alcohol use: Not Currently     Comment: quit 2008    Drug use: Not Currently     Types: Marijuana       Family History     Family History   Problem Relation Name Age of Onset    No Known Problems Mother      No Known Problems Father          Allergies:  Allergies   Allergen Reactions    Duloxetine Unknown    Pregabalin Unknown    Shellfish Derived Unknown        Outpatient Medications:  Current Outpatient Medications   Medication Instructions    albuterol (Ventolin HFA) 90 mcg/actuation inhaler 2 puffs, inhalation, Every 6 hours PRN    atorvastatin (LIPITOR) 40 mg, oral, Nightly    blood sugar diagnostic (OneTouch Verio test strips) strip Test blood sugars 4 times a day as directed    Briviact 200 mg, oral, 2 times daily    buPROPion SR (Wellbutrin SR) 150 mg 12 hr tablet     cholecalciferol (Vitamin D-3) 125 MCG (5000 UT) capsule 1 capsule, oral, Weekly, Once weekly on friday    clotrimazole (Lotrimin) 1 % cream 1 Application, Topical, 2 times daily    diclofenac sodium (Voltaren) 1 % gel gel APPLY 4 INCHES OF MEDICATION TO EACH KNEE EVERY 4 HOURS AS NEEDED    ergocalciferol (VITAMIN D-2) 1,250 mcg, oral, Weekly    Fycompa 4 mg, oral, Nightly    gabapentin (NEURONTIN) 600 mg, oral, 3 times daily    lamoTRIgine (LAMICTAL) 50  mg, oral, Daily    lisinopril 5 mg, oral, Daily    metoprolol tartrate (LOPRESSOR) 25 mg, oral, 2 times daily    montelukast (SINGULAIR) 10 mg, oral, Nightly    ondansetron (ZOFRAN) 8 mg, oral, Every 8 hours PRN    pantoprazole (ProtoNix) 40 mg EC tablet 1 tablet, oral, 2 times daily    spironolactone (ALDACTONE) 50 mg, oral, 2 times daily    topiramate (TOPAMAX) 100 mg, oral, Daily    Trulicity 1.5 mg, subcutaneous, Weekly    umeclidinium-vilanteroL (Anoro Ellipta) 62.5-25 mcg/actuation blister with device 1 puff, inhalation, Daily    valACYclovir (VALTREX) 500 mg, oral, Daily          REVIEW OF SYSTEMS  Review of Systems   Constitutional: Positive for malaise/fatigue.   Cardiovascular:  Positive for palpitations.   Respiratory:  Positive for shortness of breath.    All other systems reviewed and are negative.        VITALS  Vitals:    02/07/24 1408   BP: 126/76   Pulse: 60       PHYSICAL EXAM  Vitals reviewed.   Constitutional:       Appearance: Normal and healthy appearance. Well-developed and not in distress.   Eyes:      Conjunctiva/sclera: Conjunctivae normal.      Pupils: Pupils are equal, round, and reactive to light.   Neck:      Vascular: Carotid bruit present. No JVR. JVD normal.   Pulmonary:      Effort: Pulmonary effort is normal.      Breath sounds: Normal breath sounds. No wheezing. No rhonchi. No rales.   Chest:      Chest wall: Not tender to palpatation.   Cardiovascular:      PMI at left midclavicular line. Normal rate. Regular rhythm. Normal S1. Normal S2.       Murmurs: There is no murmur.      No gallop.  No click. No rub.   Pulses:     Intact distal pulses.   Edema:     Peripheral edema absent.   Abdominal:      Tenderness: There is no abdominal tenderness.   Musculoskeletal: Normal range of motion.         General: No tenderness.      Cervical back: Normal range of motion. Skin:     General: Skin is warm and dry.   Neurological:      General: No focal deficit present.      Mental Status: Alert  and oriented to person, place and time.   Psychiatric:         Behavior: Behavior is cooperative.           ASSESSMENT AND PLAN  Diagnoses and all orders for this visit:  Chest pain, unspecified type  Shortness of breath  HTN (hypertension), benign  Hypercholesterolemia  Atherosclerosis of native artery of both lower extremities with intermittent claudication (CMS/HCC)  Aortoiliac occlusive disease (CMS/HCC)  Hyperlipidemia LDL goal <100  Palpitations  Sinus node dysfunction (CMS/HCC)  Wide-complex tachycardia  Status post placement of implantable loop recorder  Cushing's disease (CMS/HCC)  Diabetes mellitus type II, non insulin dependent (CMS/HCC)  Arnold-Chiari syndrome without spina bifida or hydrocephalus (CMS/HCC)  COPD with asthma  PEGGY on CPAP  Current every day smoker  BMI 35.0-35.9,adult      [unfilled]

## 2024-02-07 NOTE — PATIENT INSTRUCTIONS
Patient to follow up after testing with Dr. Alfredito Tobias MD      Office will arrange Catscan Coronary arteries in near future.   Will also arrange Carotid Ultrasound    Please complete lab work prior to Catscan- orders in system.     No other changes today.   Continue same medications and treatments.   Patient educated on proper medication use.   Patient educated on risk factor modification.   Please bring any lab results from other providers / physicians to your next appointment.     Please bring all medicines, vitamins, and herbal supplements with you when you come to the office.     Prescriptions will not be filled unless you are compliant with your follow up appointments or have a follow up appointment scheduled as per instruction of your physician. Refills should be requested at the time of your visit.    IVickey RN am scribing for and in the presence of Dr. Alfredito Hudson MD

## 2024-02-09 ENCOUNTER — APPOINTMENT (OUTPATIENT)
Dept: CARDIOLOGY | Facility: CLINIC | Age: 49
End: 2024-02-09
Payer: COMMERCIAL

## 2024-02-14 ENCOUNTER — HOSPITAL ENCOUNTER (OUTPATIENT)
Dept: CARDIOLOGY | Facility: CLINIC | Age: 49
Discharge: HOME | End: 2024-02-14
Payer: COMMERCIAL

## 2024-02-14 DIAGNOSIS — E78.5 HYPERLIPIDEMIA LDL GOAL <100: ICD-10-CM

## 2024-02-14 DIAGNOSIS — I10 HTN (HYPERTENSION), BENIGN: ICD-10-CM

## 2024-02-14 DIAGNOSIS — R09.89 BRUIT OF RIGHT CAROTID ARTERY: ICD-10-CM

## 2024-02-14 DIAGNOSIS — E78.00 HYPERCHOLESTEROLEMIA: ICD-10-CM

## 2024-02-14 LAB
NON-UH HIE CREATININE: 0.73 MG/DL (ref 0.6–1.2)
NON-UH HIE ESTIMATED GFR: > 60

## 2024-02-14 PROCEDURE — 93880 EXTRACRANIAL BILAT STUDY: CPT

## 2024-02-14 PROCEDURE — 93880 EXTRACRANIAL BILAT STUDY: CPT | Performed by: INTERNAL MEDICINE

## 2024-02-15 ENCOUNTER — TELEPHONE (OUTPATIENT)
Dept: CARDIOLOGY | Facility: CLINIC | Age: 49
End: 2024-02-15
Payer: COMMERCIAL

## 2024-02-15 DIAGNOSIS — I65.23 BILATERAL CAROTID ARTERY STENOSIS: ICD-10-CM

## 2024-02-15 NOTE — TELEPHONE ENCOUNTER
2/15  Call placed to patient and left voice mail message requesting return call for results and recommendations.

## 2024-02-15 NOTE — TELEPHONE ENCOUNTER
----- Message from Alfredito Hudson MD sent at 2/15/2024  7:57 AM EST -----  Moderate bilateral carotid artery stenosis, no high-grade carotid artery stenosis, repeat carotid ultrasound in approximately 1 year prior to office visit  ----- Message -----  From: Olivier, Syngo - Cardiology Results In  Sent: 2/14/2024   5:04 PM EST  To: Alfredito Hudson MD

## 2024-03-08 ENCOUNTER — HOSPITAL ENCOUNTER (OUTPATIENT)
Dept: RADIOLOGY | Facility: CLINIC | Age: 49
Discharge: HOME | End: 2024-03-08
Payer: COMMERCIAL

## 2024-03-08 VITALS
HEART RATE: 56 BPM | DIASTOLIC BLOOD PRESSURE: 47 MMHG | OXYGEN SATURATION: 100 % | SYSTOLIC BLOOD PRESSURE: 105 MMHG | RESPIRATION RATE: 22 BRPM

## 2024-03-08 DIAGNOSIS — R06.02 SHORTNESS OF BREATH: ICD-10-CM

## 2024-03-08 DIAGNOSIS — R00.2 PALPITATIONS: ICD-10-CM

## 2024-03-08 DIAGNOSIS — R93.1 ABNORMAL FINDINGS ON DIAGNOSTIC IMAGING OF HEART AND CORONARY CIRCULATION: ICD-10-CM

## 2024-03-08 DIAGNOSIS — R07.9 CHEST PAIN, UNSPECIFIED TYPE: ICD-10-CM

## 2024-03-08 DIAGNOSIS — E78.5 HYPERLIPIDEMIA LDL GOAL <100: ICD-10-CM

## 2024-03-08 DIAGNOSIS — I10 HTN (HYPERTENSION), BENIGN: ICD-10-CM

## 2024-03-08 PROCEDURE — 75574 CT ANGIO HRT W/3D IMAGE: CPT

## 2024-03-08 PROCEDURE — 2550000001 HC RX 255 CONTRASTS: Performed by: INTERNAL MEDICINE

## 2024-03-08 PROCEDURE — 75580 N-INVAS EST C FFR SW ALY CTA: CPT

## 2024-03-08 PROCEDURE — 2500000001 HC RX 250 WO HCPCS SELF ADMINISTERED DRUGS (ALT 637 FOR MEDICARE OP): Performed by: INTERNAL MEDICINE

## 2024-03-08 RX ORDER — NITROGLYCERIN 400 UG/1
2 SPRAY ORAL ONCE
Status: COMPLETED | OUTPATIENT
Start: 2024-03-08 | End: 2024-03-08

## 2024-03-08 RX ADMIN — IOHEXOL 80 ML: 350 INJECTION, SOLUTION INTRAVENOUS at 09:42

## 2024-03-08 RX ADMIN — NITROGLYCERIN 2 SPRAY: 400 SPRAY ORAL at 09:20

## 2024-03-08 NOTE — NURSING NOTE
Post CCTA pt denies feeling dizzy or lightheaded, denies headache. Steady on feet, skin warm pink and dry. Pt verbalized understanding of increased water intake x24 hours, educated on side effects of medications. HL removed with tip intact, manual pressure held until hemostasis achieved, 2x2 and coban to site. Pt DC home to self care

## 2024-03-13 ENCOUNTER — TELEPHONE (OUTPATIENT)
Dept: CARDIOLOGY | Facility: CLINIC | Age: 49
End: 2024-03-13
Payer: COMMERCIAL

## 2024-03-13 NOTE — TELEPHONE ENCOUNTER
Patient seen recently with Dr. Alfredito Tobias MD  in office.     Needs follow up to discuss CTA  I do see that we ordered a Carotid US but patient has not completed or scheduled.     Can we call her to arrange follow up with Dr. Alfredito Tobias MD  soon for results.   And also schedule Carotid US sometime soon. Does not need to be before CDO visit.     Thanks

## 2024-03-13 NOTE — TELEPHONE ENCOUNTER
----- Message from Vickey Murray RN sent at 3/10/2024 11:33 PM EDT -----    ----- Message -----  From: Alfredito Hudson MD  Sent: 3/8/2024  12:48 PM EDT  To: Dunia Olivas LPN    50% mid LAD and 50% mid RCA on CT coronary angiography, needs office visit to further discuss  ----- Message -----  From: Interface, Radiology Results In  Sent: 3/8/2024  11:17 AM EST  To: Alfredito Hudson MD

## 2024-03-15 ENCOUNTER — HOSPITAL ENCOUNTER (OUTPATIENT)
Dept: CARDIOLOGY | Facility: HOSPITAL | Age: 49
Discharge: HOME | End: 2024-03-15
Payer: COMMERCIAL

## 2024-03-15 DIAGNOSIS — R00.2 PALPITATIONS: ICD-10-CM

## 2024-03-15 DIAGNOSIS — Z95.818 PRESENCE OF OTHER CARDIAC IMPLANTS AND GRAFTS: ICD-10-CM

## 2024-03-15 PROCEDURE — 93298 REM INTERROG DEV EVAL SCRMS: CPT

## 2024-03-15 PROCEDURE — 93298 REM INTERROG DEV EVAL SCRMS: CPT | Performed by: INTERNAL MEDICINE

## 2024-04-02 ENCOUNTER — OFFICE VISIT (OUTPATIENT)
Dept: ENDOCRINOLOGY | Facility: CLINIC | Age: 49
End: 2024-04-02
Payer: COMMERCIAL

## 2024-04-02 VITALS
DIASTOLIC BLOOD PRESSURE: 84 MMHG | OXYGEN SATURATION: 94 % | WEIGHT: 189 LBS | BODY MASS INDEX: 31.49 KG/M2 | HEART RATE: 92 BPM | HEIGHT: 65 IN | SYSTOLIC BLOOD PRESSURE: 119 MMHG

## 2024-04-02 DIAGNOSIS — E11.42 DIABETIC POLYNEUROPATHY ASSOCIATED WITH TYPE 2 DIABETES MELLITUS (MULTI): ICD-10-CM

## 2024-04-02 DIAGNOSIS — E66.01 MORBID OBESITY (MULTI): ICD-10-CM

## 2024-04-02 DIAGNOSIS — F17.200 CURRENT EVERY DAY SMOKER: ICD-10-CM

## 2024-04-02 DIAGNOSIS — E11.9 DIABETES MELLITUS TYPE II, NON INSULIN DEPENDENT (MULTI): Primary | ICD-10-CM

## 2024-04-02 DIAGNOSIS — E04.2 MULTINODULAR GOITER (NONTOXIC): ICD-10-CM

## 2024-04-02 PROBLEM — F31.81 BIPOLAR 2 DISORDER, MAJOR DEPRESSIVE EPISODE (MULTI): Chronic | Status: ACTIVE | Noted: 2024-04-02

## 2024-04-02 PROBLEM — E24.0 PITUITARY CUSHING'S SYNDROME (MULTI): Status: RESOLVED | Noted: 2023-12-13 | Resolved: 2024-04-02

## 2024-04-02 PROBLEM — I21.4 NSTEMI (NON-ST ELEVATED MYOCARDIAL INFARCTION) (MULTI): Status: ACTIVE | Noted: 2022-09-07

## 2024-04-02 PROBLEM — R09.89 CAROTID BRUIT: Status: ACTIVE | Noted: 2024-04-02

## 2024-04-02 PROBLEM — E24.9 HYPERCORTISOLISM (MULTI): Status: RESOLVED | Noted: 2023-09-21 | Resolved: 2024-04-02

## 2024-04-02 PROBLEM — E24.0: Status: RESOLVED | Noted: 2023-12-13 | Resolved: 2024-04-02

## 2024-04-02 PROCEDURE — 4010F ACE/ARB THERAPY RXD/TAKEN: CPT | Performed by: PHYSICIAN ASSISTANT

## 2024-04-02 PROCEDURE — 3074F SYST BP LT 130 MM HG: CPT | Performed by: PHYSICIAN ASSISTANT

## 2024-04-02 PROCEDURE — 3079F DIAST BP 80-89 MM HG: CPT | Performed by: PHYSICIAN ASSISTANT

## 2024-04-02 PROCEDURE — 99214 OFFICE O/P EST MOD 30 MIN: CPT | Performed by: PHYSICIAN ASSISTANT

## 2024-04-02 PROCEDURE — 3008F BODY MASS INDEX DOCD: CPT | Performed by: PHYSICIAN ASSISTANT

## 2024-04-02 ASSESSMENT — PAIN SCALES - GENERAL: PAINLEVEL: 0-NO PAIN

## 2024-04-02 ASSESSMENT — ENCOUNTER SYMPTOMS: NERVOUS/ANXIOUS: 0

## 2024-04-02 NOTE — PATIENT INSTRUCTIONS
Type 2 diabetes mellitus, is at goal. A1C, lipid panel, urine albumin labs all due to for update.     RX changes:  resume Trulicity as 0.75mg once weekly and lispro as per sliding scale as needed  Education:  interpretation of lab results and blood sugar goals  Follow up: I recommend diabetes care be in 6 months with your primary care provider or 4-5 months with new  Endocrinologist.   Skip trulicity for 1 week before any surgeries     Current Every Day Smoker  Work on quitting smoking in order to safely undergo your vascular, knee, and excess skin removal surgeries  Meeting with a smoking cessation hypno-therapist is a valid means to trial a non-medication tobacco use cessation

## 2024-04-02 NOTE — PROGRESS NOTES
"Aby Marin is a 48 y.o. female who presents for follow-up of Type 2 diabetes mellitus. The initial diagnosis of diabetes was made  over 10 years ago . The patient does have a known family history of diabetes.    Known complications due to diabetes included peripheral neuropathy, peripheral vascular disease, and obesity    Cardiovascular risk factors include diabetes mellitus, obesity (BMI >= 30 kg/m2), sedentary lifestyle, and smoking/ tobacco exposure. The patient is on an ACE inhibitor or angiotensin II receptor blocker.  The patient has not been previously hospitalized due to diabetic ketoacidosis.     Current symptoms/problems include hyperglycemia and paresthesia of the feet. Her clinical course has been stable.     Current diabetes regimen is as follows:  been without trulicity for 4 months, lispro sliding scale as needed      The patient is currently checking the blood glucose 2+ times per day.    Patient is using: glucometer    Hypoglycemia frequency: n/a  Hypoglycemia awareness: Yes     Exercise: intermittently   Meal panning: She is using avoidance of concentrated sweets.    Getting set up for L knee surgery and aortic stent placement. Hopes to find a new plastic surgeon who accepts her insurance for pannus skin reduction.    Review of Systems   Psychiatric/Behavioral:  The patient is not nervous/anxious.         Mood stability improved with lamictal start   All other systems reviewed and are negative.      Objective   /84 (BP Location: Left arm, Patient Position: Sitting)   Pulse 92   Ht 1.651 m (5' 5\")   Wt 85.7 kg (189 lb)   SpO2 94%   BMI 31.45 kg/m²   Physical Exam  Constitutional:       Appearance: Normal appearance. She is obese.   Cardiovascular:      Pulses: Normal pulses.   Abdominal:      Palpations: Abdomen is soft.      Comments: Pendulous panus    Skin:     Findings: Erythema and rash present. Rash is macular.      Comments: Telangectasias on face, rash under R " breast with erythematous satellite borders, Rash under panus is erythematous at friction sites,   Neurological:      General: No focal deficit present.      Mental Status: She is alert and oriented to person, place, and time. Mental status is at baseline.   Psychiatric:         Mood and Affect: Mood normal.         Behavior: Behavior normal.         Thought Content: Thought content normal.         Judgment: Judgment normal.      Comments: Talkative - baseline         Lab Review  Glucose (mg/dL)   Date Value   02/02/2024 126 (H)   10/31/2023 111 (H)   06/20/2023 98   01/28/2022 225 (H)   01/13/2022 159 (H)     Hemoglobin A1C (%)   Date Value   10/31/2023 5.6   04/04/2023 5.9 (A)   01/13/2022 8.5 (A)   06/15/2021 7.5     Bicarbonate (mmol/L)   Date Value   02/02/2024 21   10/31/2023 18 (L)   06/20/2023 20 (L)   01/28/2022 28   01/13/2022 27     Urea Nitrogen (mg/dL)   Date Value   02/02/2024 15   10/31/2023 15   06/20/2023 21   01/28/2022 15   01/13/2022 24 (H)     Creatinine (mg/dL)   Date Value   02/02/2024 0.79   10/31/2023 0.70   06/20/2023 0.90   01/28/2022 0.62   01/13/2022 0.95       Health Maintenance:   Foot Exam: up to date as of 10/31/23  Eye Exam: due for update, scheduled for tomorrow  Lipid Panel: up to date though above goal <70 LDL as of 10/31/23  Urine Albumin: up to date and at goal of <30 as of 10/31/23    Assessment/Plan   Type 2 diabetes mellitus, is at goal. A1C, lipid panel, urine albumin labs all due to for update.     RX changes:  resume Trulicity as 0.75mg once weekly and lispro as per sliding scale as needed  Education:  interpretation of lab results and blood sugar goals  Follow up: I recommend diabetes care be in 6 months with your primary care provider or 4-5 months with new  Endocrinologist.   Skip trulicity for 1 week before any surgeries     Current Every Day Smoker  Work on quitting smoking in order to safely undergo your vascular, knee, and excess skin removal surgeries  Meeting  with a smoking cessation hypno-therapist is a valid means to trial a non-medication tobacco use cessation

## 2024-04-03 ENCOUNTER — OFFICE VISIT (OUTPATIENT)
Dept: CARDIOLOGY | Facility: CLINIC | Age: 49
End: 2024-04-03
Payer: COMMERCIAL

## 2024-04-03 VITALS
SYSTOLIC BLOOD PRESSURE: 138 MMHG | WEIGHT: 198.9 LBS | DIASTOLIC BLOOD PRESSURE: 90 MMHG | HEIGHT: 66 IN | BODY MASS INDEX: 31.97 KG/M2 | HEART RATE: 58 BPM

## 2024-04-03 DIAGNOSIS — Q07.00 ARNOLD-CHIARI SYNDROME WITHOUT SPINA BIFIDA OR HYDROCEPHALUS (MULTI): ICD-10-CM

## 2024-04-03 DIAGNOSIS — J44.9 CHRONIC OBSTRUCTIVE PULMONARY DISEASE, UNSPECIFIED COPD TYPE (MULTI): ICD-10-CM

## 2024-04-03 DIAGNOSIS — I47.29 NSVT (NONSUSTAINED VENTRICULAR TACHYCARDIA) (MULTI): ICD-10-CM

## 2024-04-03 DIAGNOSIS — E78.5 HYPERLIPIDEMIA LDL GOAL <100: ICD-10-CM

## 2024-04-03 DIAGNOSIS — I70.213 ATHEROSCLEROSIS OF NATIVE ARTERY OF BOTH LOWER EXTREMITIES WITH INTERMITTENT CLAUDICATION (CMS-HCC): ICD-10-CM

## 2024-04-03 DIAGNOSIS — G47.33 OSA ON CPAP: ICD-10-CM

## 2024-04-03 DIAGNOSIS — Z95.818 STATUS POST PLACEMENT OF IMPLANTABLE LOOP RECORDER: ICD-10-CM

## 2024-04-03 DIAGNOSIS — E11.9 DIABETES MELLITUS TYPE II, NON INSULIN DEPENDENT (MULTI): ICD-10-CM

## 2024-04-03 DIAGNOSIS — F17.200 CURRENT EVERY DAY SMOKER: ICD-10-CM

## 2024-04-03 DIAGNOSIS — I10 HTN (HYPERTENSION), BENIGN: ICD-10-CM

## 2024-04-03 DIAGNOSIS — F41.9 ANXIETY: ICD-10-CM

## 2024-04-03 DIAGNOSIS — F31.81 BIPOLAR 2 DISORDER, MAJOR DEPRESSIVE EPISODE (MULTI): Chronic | ICD-10-CM

## 2024-04-03 DIAGNOSIS — J44.89 COPD WITH ASTHMA (MULTI): ICD-10-CM

## 2024-04-03 DIAGNOSIS — E78.00 HYPERCHOLESTEROLEMIA: ICD-10-CM

## 2024-04-03 DIAGNOSIS — R07.9 CHEST PAIN, UNSPECIFIED TYPE: ICD-10-CM

## 2024-04-03 DIAGNOSIS — F19.10 DRUG ABUSE (MULTI): ICD-10-CM

## 2024-04-03 DIAGNOSIS — I74.09 AORTOILIAC OCCLUSIVE DISEASE (MULTI): ICD-10-CM

## 2024-04-03 DIAGNOSIS — E11.59 TYPE 2 DIABETES MELLITUS WITH OTHER CIRCULATORY COMPLICATION, WITHOUT LONG-TERM CURRENT USE OF INSULIN (MULTI): ICD-10-CM

## 2024-04-03 PROCEDURE — 3075F SYST BP GE 130 - 139MM HG: CPT | Performed by: INTERNAL MEDICINE

## 2024-04-03 PROCEDURE — 3008F BODY MASS INDEX DOCD: CPT | Performed by: INTERNAL MEDICINE

## 2024-04-03 PROCEDURE — 3080F DIAST BP >= 90 MM HG: CPT | Performed by: INTERNAL MEDICINE

## 2024-04-03 PROCEDURE — 99214 OFFICE O/P EST MOD 30 MIN: CPT | Performed by: INTERNAL MEDICINE

## 2024-04-03 PROCEDURE — 4010F ACE/ARB THERAPY RXD/TAKEN: CPT | Performed by: INTERNAL MEDICINE

## 2024-04-03 RX ORDER — NAPROXEN SODIUM 220 MG/1
81 TABLET, FILM COATED ORAL DAILY
Qty: 90 TABLET | Refills: 3 | OUTPATIENT
Start: 2024-04-03 | End: 2025-04-03

## 2024-04-03 RX ORDER — ATORVASTATIN CALCIUM 80 MG/1
80 TABLET, FILM COATED ORAL NIGHTLY
Qty: 90 TABLET | Refills: 3 | OUTPATIENT
Start: 2024-04-03 | End: 2025-04-03

## 2024-04-03 NOTE — PATIENT INSTRUCTIONS
Patient to follow up in 1 year with Dr. Alfredito Tobias MD      Please continue higher dose of Atorvastatin, and repeat lab work in 2 months to check cholesterol.   Orders in system. Will call with results.     No other changes today.   Continue same medications and treatments.   Patient educated on proper medication use.   Patient educated on risk factor modification.   Please bring any lab results from other providers / physicians to your next appointment.     Please bring all medicines, vitamins, and herbal supplements with you when you come to the office.     Prescriptions will not be filled unless you are compliant with your follow up appointments or have a follow up appointment scheduled as per instruction of your physician. Refills should be requested at the time of your visit.    IVickey RN am scribing for and in the presence of Dr. Alfredito Hudson MD

## 2024-04-03 NOTE — PROGRESS NOTES
CARDIOLOGY OFFICE VISIT      CHIEF COMPLAINT      HISTORY OF PRESENT ILLNESS  The patient states that she is not having any further chest discomfort.  She does have some dyspnea at times due to her bronchial asthma and COPD.  This seems to be stable.  She denies palpitations and syncope.  Unfortunately she started smoking cigarettes again.  I told her she needs to stop smoking.  I did go over her most recent lipid profile with her.  This is from October of last year.  Cholesterol 161, HDL 33, LDL 96, triglycerides 161.  She states her vascular surgeon at [facility] just increased her atorvastatin from 40 mg a day to 80 mg a day.  I told her I agree with that and would recommend rechecking her lipids in 2 months.  I told her we will call her with results.  I did discuss her CT coronary angiograms with her.  Her worst lesions are 50% mid RCA and 50% mid circumflex.  She has mild CAD elsewhere.  I explained to her that she needs to try to get her LDL cholesterol down to 70 or below and discontinue smoking cigarettes again.    Impression:  Coronary Artery Disease, mild to moderate by CCTA 3/2024  History of nonsustained ventricular tachycardia on event monitor, Dr. Monson  Loop Recorder in place, Dr. Monson managing   Normal left ventricular systolic function by echocardiogram 2023  No evidence of myocardial ischemia per stress test 2023   Obesity  History of bronchial asthma  PAD / Aortoiliac Occlusive Disease. Vascular at [facility] managing   Arnold-Chiari Syndrome  Cushing Syndrome   Hypertension  Hyperlipidemia   Diabetes Mellitus, Type 2.  JOSE- Dr. Lechuga   Bipolar/ Anxiety / Drug Abuse   COPD/Asthma   Obstructive Sleep Apnea on PAP therapy   Current Smoker Daily         Past Medical History  Past Medical History:   Diagnosis Date    Asthma     Current smoker     Diabetes mellitus (CMS/[facility])     Disease of thyroid gland     History of Chiari malformation     Hypercortisolism (CMS/HCC) 09/21/2023    Hyperlipidemia      Hypertension     OCD (obsessive compulsive disorder)     Other disorders of lung     Lung trouble    Personal history of other diseases of the digestive system     History of gastroesophageal reflux (GERD)    Personal history of other diseases of the musculoskeletal system and connective tissue     History of arthritis    Personal history of other diseases of the nervous system and sense organs     History of sleep apnea    Personal history of other diseases of the respiratory system     History of chronic obstructive lung disease    Personal history of other diseases of the respiratory system     History of bronchitis    Personal history of other diseases of the respiratory system     History of asthma    Personal history of other endocrine, nutritional and metabolic disease     History of diabetes mellitus    Personal history of other endocrine, nutritional and metabolic disease     History of thyroid disorder    Personal history of other mental and behavioral disorders     History of depression    Personal history of other mental and behavioral disorders     History of mental disorder    Personal history of other specified conditions     History of snoring       Social History  Social History     Tobacco Use    Smoking status: Every Day     Packs/day: .5     Types: Cigarettes    Smokeless tobacco: Never   Substance Use Topics    Alcohol use: Not Currently     Comment: quit 2008    Drug use: Not Currently     Types: Marijuana       Family History     Family History   Problem Relation Name Age of Onset    Hypertension Mother      Arthritis Mother      No Known Problems Father          Allergies:  Allergies   Allergen Reactions    Duloxetine Unknown    Pregabalin Unknown    Shellfish Derived Unknown        Outpatient Medications:  Current Outpatient Medications   Medication Instructions    albuterol (Ventolin HFA) 90 mcg/actuation inhaler 2 puffs, inhalation, Every 6 hours PRN    atorvastatin (LIPITOR) 40 mg, oral,  Nightly    blood sugar diagnostic (OneTouch Verio test strips) strip Test blood sugars 4 times a day as directed    Briviact 200 mg, oral, 2 times daily    buPROPion SR (Wellbutrin SR) 150 mg 12 hr tablet     cholecalciferol (Vitamin D-3) 125 MCG (5000 UT) capsule 1 capsule, oral, Weekly, Once weekly on friday    clotrimazole (Lotrimin) 1 % cream 1 Application, Topical, 2 times daily    diclofenac sodium (Voltaren) 1 % gel gel APPLY 4 INCHES OF MEDICATION TO EACH KNEE EVERY 4 HOURS AS NEEDED    dulaglutide (TRULICITY) 0.75 mg, subcutaneous, Weekly    ergocalciferol (VITAMIN D-2) 1,250 mcg, oral, Weekly    Fycompa 4 mg, oral, Nightly    gabapentin (NEURONTIN) 600 mg, oral, 3 times daily    lamoTRIgine (LAMICTAL) 50 mg, oral, Daily    lisinopril 5 mg, oral, Daily    metoprolol tartrate (LOPRESSOR) 25 mg, oral, 2 times daily    montelukast (SINGULAIR) 10 mg, oral, Nightly    ondansetron (ZOFRAN) 8 mg, oral, Every 8 hours PRN    pantoprazole (ProtoNix) 40 mg EC tablet 1 tablet, oral, 2 times daily    topiramate (TOPAMAX) 100 mg, oral, Daily    umeclidinium-vilanteroL (Anoro Ellipta) 62.5-25 mcg/actuation blister with device 1 puff, inhalation, Daily    valACYclovir (VALTREX) 500 mg, oral, Daily          REVIEW OF SYSTEMS  Review of Systems   All other systems reviewed and are negative.        VITALS  Vitals:    04/03/24 0945   BP: 138/90   Pulse: 58       PHYSICAL EXAM  Vitals reviewed.   Constitutional:       Appearance: Normal and healthy appearance. Well-developed and not in distress.   Eyes:      Conjunctiva/sclera: Conjunctivae normal.      Pupils: Pupils are equal, round, and reactive to light.   Neck:      Vascular: No JVR. JVD normal.   Pulmonary:      Effort: Pulmonary effort is normal.      Breath sounds: Normal breath sounds. No wheezing. No rhonchi. No rales.   Chest:      Chest wall: Not tender to palpatation.   Cardiovascular:      PMI at left midclavicular line. Normal rate. Regular rhythm. Normal S1.  Normal S2.       Murmurs: There is no murmur.      No gallop.  No click. No rub.   Pulses:     Intact distal pulses.   Edema:     Peripheral edema absent.   Abdominal:      Tenderness: There is no abdominal tenderness.   Musculoskeletal: Normal range of motion.         General: No tenderness.      Cervical back: Normal range of motion. Skin:     General: Skin is warm and dry.   Neurological:      General: No focal deficit present.      Mental Status: Alert and oriented to person, place and time.   Psychiatric:         Behavior: Behavior is cooperative.           ASSESSMENT AND PLAN  Diagnoses and all orders for this visit:  Chest pain, unspecified type  NSVT (nonsustained ventricular tachycardia) (CMS/[facility])  Status post placement of implantable loop recorder  Atherosclerosis of native artery of both lower extremities with intermittent claudication (CMS/[facility])  Aortoiliac occlusive disease (CMS/[facility])  HTN (hypertension), benign  Hypercholesterolemia  Type 2 diabetes mellitus with other circulatory complication, without long-term current use of insulin (CMS/[facility])  Diabetes mellitus type II, non insulin dependent (CMS/[facility])  BMI 32.0-32.9,adult  Bipolar 2 disorder, major depressive episode (CMS/[facility])  Anxiety  Drug abuse (CMS/[facility])  Arnold-Chiari syndrome without spina bifida or hydrocephalus (CMS/[facility])  Chronic obstructive pulmonary disease, unspecified COPD type (CMS/[facility])  COPD with asthma (CMS/[facility])  PEGGY on CPAP  Current every day smoker  Hyperlipidemia LDL goal <100      [unfilled]

## 2024-04-19 ENCOUNTER — HOSPITAL ENCOUNTER (OUTPATIENT)
Dept: CARDIOLOGY | Facility: HOSPITAL | Age: 49
Discharge: HOME | End: 2024-04-19
Payer: COMMERCIAL

## 2024-04-19 DIAGNOSIS — Z95.818 PRESENCE OF OTHER CARDIAC IMPLANTS AND GRAFTS: ICD-10-CM

## 2024-04-19 DIAGNOSIS — R00.2 PALPITATIONS: ICD-10-CM

## 2024-04-19 PROCEDURE — 93298 REM INTERROG DEV EVAL SCRMS: CPT

## 2024-04-19 PROCEDURE — 93298 REM INTERROG DEV EVAL SCRMS: CPT | Performed by: INTERNAL MEDICINE

## 2024-04-29 ENCOUNTER — TELEPHONE (OUTPATIENT)
Dept: CARDIOLOGY | Facility: CLINIC | Age: 49
End: 2024-04-29
Payer: COMMERCIAL

## 2024-04-29 NOTE — TELEPHONE ENCOUNTER
Patient called and left a voicemail. She stated that she lost the monitor that goes to her loop recorder.

## 2024-05-08 ENCOUNTER — OFFICE VISIT (OUTPATIENT)
Dept: NEUROLOGY | Facility: CLINIC | Age: 49
End: 2024-05-08
Payer: COMMERCIAL

## 2024-05-08 VITALS
DIASTOLIC BLOOD PRESSURE: 60 MMHG | WEIGHT: 197.8 LBS | BODY MASS INDEX: 32.96 KG/M2 | HEART RATE: 65 BPM | HEIGHT: 65 IN | SYSTOLIC BLOOD PRESSURE: 100 MMHG

## 2024-05-08 DIAGNOSIS — Q07.00 ARNOLD-CHIARI SYNDROME WITHOUT SPINA BIFIDA OR HYDROCEPHALUS (MULTI): Primary | ICD-10-CM

## 2024-05-08 DIAGNOSIS — G40.219 PARTIAL SYMPTOMATIC EPILEPSY WITH COMPLEX PARTIAL SEIZURES, INTRACTABLE, WITHOUT STATUS EPILEPTICUS (MULTI): ICD-10-CM

## 2024-05-08 DIAGNOSIS — G44.221 CHRONIC TENSION-TYPE HEADACHE, INTRACTABLE: ICD-10-CM

## 2024-05-08 DIAGNOSIS — G95.0 SYRINGOMYELIA (MULTI): ICD-10-CM

## 2024-05-08 PROCEDURE — 4010F ACE/ARB THERAPY RXD/TAKEN: CPT | Performed by: PSYCHIATRY & NEUROLOGY

## 2024-05-08 PROCEDURE — 99214 OFFICE O/P EST MOD 30 MIN: CPT | Performed by: PSYCHIATRY & NEUROLOGY

## 2024-05-08 PROCEDURE — 3008F BODY MASS INDEX DOCD: CPT | Performed by: PSYCHIATRY & NEUROLOGY

## 2024-05-08 PROCEDURE — 3078F DIAST BP <80 MM HG: CPT | Performed by: PSYCHIATRY & NEUROLOGY

## 2024-05-08 PROCEDURE — 3074F SYST BP LT 130 MM HG: CPT | Performed by: PSYCHIATRY & NEUROLOGY

## 2024-05-08 RX ORDER — TOPIRAMATE 50 MG/1
50 TABLET, FILM COATED ORAL 2 TIMES DAILY
Qty: 60 TABLET | Refills: 6 | Status: SHIPPED | OUTPATIENT
Start: 2024-05-08

## 2024-05-08 ASSESSMENT — PATIENT HEALTH QUESTIONNAIRE - PHQ9
SUM OF ALL RESPONSES TO PHQ9 QUESTIONS 1 & 2: 2
10. IF YOU CHECKED OFF ANY PROBLEMS, HOW DIFFICULT HAVE THESE PROBLEMS MADE IT FOR YOU TO DO YOUR WORK, TAKE CARE OF THINGS AT HOME, OR GET ALONG WITH OTHER PEOPLE: SOMEWHAT DIFFICULT
1. LITTLE INTEREST OR PLEASURE IN DOING THINGS: SEVERAL DAYS
2. FEELING DOWN, DEPRESSED OR HOPELESS: NOT AT ALL
2. FEELING DOWN, DEPRESSED OR HOPELESS: SEVERAL DAYS
SUM OF ALL RESPONSES TO PHQ9 QUESTIONS 1 & 2: 0
1. LITTLE INTEREST OR PLEASURE IN DOING THINGS: NOT AT ALL

## 2024-05-08 ASSESSMENT — ENCOUNTER SYMPTOMS
LOSS OF SENSATION IN FEET: 1
OCCASIONAL FEELINGS OF UNSTEADINESS: 1
SLEEP DISTURBANCE: 1
DEPRESSION: 1
SEIZURES: 1

## 2024-05-08 NOTE — PROGRESS NOTES
Ruth Marin  48 y.o.       SUBJECTIVE    Seizures        Ruth is a 48-year-old young lady was seen today for follow-up of her partial complex seizure with secondary generalization with history of Arnold-Chiari malformation s/p surgery.  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  She is getting Topamax and Briviact from me and has been seizure-free and only had 1 breakthrough seizure few seconds when she was upset.  No history of any other symptoms since last seen no side effects of the medication.  Today her physical and neurological nonfocal except slightly wide-based ataxic gait but she was able to ambulate without difficulty.  I would like to continue her medicine the way she is taking and have requested her to bring all her medications since she is on quite a few of the medication which she has not been taking.  I have discussed the risk and the precautions importance of taking medications regularly and the trigger factors for breakthrough seizures which patient understood.      Due to technical limitations of voice recognition and human error, this note may not accurately reflect the care of the patient.    Review of Systems   Neurological:  Positive for seizures.   Psychiatric/Behavioral:  Positive for behavioral problems and sleep disturbance.         Patient Active Problem List   Diagnosis    Diabetes mellitus type II, non insulin dependent (Multi)    Mixed conductive and sensorineural hearing loss of right ear with restricted hearing of left ear    Arnold-Chiari syndrome without spina bifida or hydrocephalus (Multi)    Diabetic neuropathy (Multi)    Fluid level behind tympanic membrane of right ear    History of thyroid nodule    Hyperlipidemia LDL goal <100     Hypokalemia    Multinodular goiter (nontoxic)    Nausea and vomiting    PCOS (polycystic ovarian syndrome)    Seizure disorder (Multi)    Sensorineural hearing loss (SNHL) of left ear with restricted hearing of right ear    Syringomyelia (Multi)    Unilateral headache    Vitamin D deficiency    Bradycardia    Obesity    Neuropathy    Sinus node dysfunction (Multi)    Syncope and collapse    Vitamin B12 deficiency    Current every day smoker    Other emphysema (Multi)    Abnormal loss of weight    Acid reflux    Anxiety    Aortoiliac occlusive disease (Multi)    Atherosclerosis of native artery of both lower extremities with intermittent claudication (CMS-HCC)    Brunner's gland hyperplasia of duodenum    Chronic midline low back pain with bilateral sciatica    Constipation, unspecified    Cramps, extremity    Depression    Disturbance of skin sensation    Drug abuse (Multi)    DUB (dysfunctional uterine bleeding)    Epigastric pain    Genital HSV    H/O ETOH abuse    Irregular menses    ITB syndrome    Melena    Menorrhagia    PEGGY on CPAP    Sleep apnea    Rectal bleed    Thyroid nodule    Tubular adenoma of colon    HTN (hypertension), benign    Diabetes mellitus (Multi)    Diabetes (Multi)    High cholesterol    Hypercholesterolemia    Asymmetric SNHL (sensorineural hearing loss)    Morbid obesity (Multi)    Chronic obstructive pulmonary disease (Multi)    COPD with asthma (Multi)    Epilepsy (Multi)    Seizure (Multi)    Headache disorder    BMI 32.0-32.9,adult    Wide-complex tachycardia    Palpitations    Night sweats    Chest pain    Shortness of breath    Status post placement of implantable loop recorder    NSTEMI (non-ST elevated myocardial infarction) (Multi)    Carotid bruit    Bipolar 2 disorder, major depressive episode (Multi)    NSVT (nonsustained ventricular tachycardia) (Multi)     Past Medical History:   Diagnosis Date    Asthma ([facility])     Current smoker     Diabetes mellitus (Multi)     Disease  of thyroid gland     History of Chiari malformation     Hypercortisolism (Multi) 09/21/2023    Hyperlipidemia     Hypertension     OCD (obsessive compulsive disorder)     Other disorders of lung     Lung trouble    Personal history of other diseases of the digestive system     History of gastroesophageal reflux (GERD)    Personal history of other diseases of the musculoskeletal system and connective tissue     History of arthritis    Personal history of other diseases of the nervous system and sense organs     History of sleep apnea    Personal history of other diseases of the respiratory system     History of chronic obstructive lung disease    Personal history of other diseases of the respiratory system     History of bronchitis    Personal history of other diseases of the respiratory system     History of asthma    Personal history of other endocrine, nutritional and metabolic disease     History of diabetes mellitus    Personal history of other endocrine, nutritional and metabolic disease     History of thyroid disorder    Personal history of other mental and behavioral disorders     History of depression    Personal history of other mental and behavioral disorders     History of mental disorder    Personal history of other specified conditions     History of snoring     Past Surgical History:   Procedure Laterality Date    BRAIN SURGERY      CARDIAC ELECTROPHYSIOLOGY PROCEDURE Left 2/2/2024    Procedure: Loop Insertion;  Surgeon: Latrell Monson MD;  Location: [facility];  Service: Electrophysiology;  Laterality: Left;    GALLBLADDER SURGERY  12/07/2017    Gallbladder Surgery    TONSILLECTOMY  12/07/2017    Tonsillectomy With Adenoidectomy       reports that she has been smoking cigarettes. She has never used smokeless tobacco. She reports that she does not currently use alcohol. She reports that she does not currently use drugs after having used the following drugs: Marijuana.    /60 (BP  "Location: Left arm, Patient Position: Sitting, BP Cuff Size: Large adult)   Pulse 65   Ht 1.651 m (5' 5\")   Wt 89.7 kg (197 lb 12.8 oz)   BMI 32.92 kg/m²     OBJECTIVE  Physical Exam/Neurological Exam     Constitutional: General appearance: no acute distress .  Patient has quite a few coarse hairs and on the face probably from hirsutism.  Auscultation of Heart: Regular rate and rhythm, no murmurs, normal S1 and S2.   Carotid Arteries: Intact without any bruits.   Neck is supple.   No lymph adenopathy.   Peripheral Vascular Exam: Pulses 1 +and equal in all extremities. No swelling, varicosities in lower extremities,, edema or tenderness to palpations.   Abdomen is soft, nondistended. No organomegaly.  Mental status: The patient was in no distress, alert, interactive and cooperative. Affect is appropriate.   Orientation: oriented to person, oriented to place and oriented to time.   Memory: recent memory intact and remote memory intact.   Attention: normal attention span and normal concentrating ability.   Language: normal comprehension and no difficulty naming common objects.   Fund of knowledge: Patient displays adequate knowledge of current events, adequate fund of knowledge regarding past history and adequate fund of knowledge regarding vocabulary.   Eyes: The ophthalmoscopic examination was normal. The fundi are visualized with normal disc margins and without.  Cranial nerve II: Visual fields full to confrontation.   Cranial nerves III, IV, and VI: Pupils round, equally reactive to light; no ptosis. EOMs intact. No nystagmus.   Cranial Nerve V: Facial sensation intact bilaterally.   Cranial nerve VII: Normal and symmetric facial strength.   Cranial nerve VIII: Hearing is intact bilaterally to finger rub / whisper.   Cranial nerves IX and X: Palate elevates symmetrically.   Cranial nerve XI: Shoulder shrug and neck rotation strength are intact.   Cranial nerve XII: Tongue midline with normal strength.   Motor: " Motor exam was normal. Muscle bulk was normal in both upper and lower extremities. Muscle tone was normal in both upper and lower extremities. Muscle strength was 5/5 throughout. no abnormal or adventitious movements were present.   Deep Tendon Reflexes: left biceps 2+ , right biceps 2+, left triceps 2+, right triceps 2+, left brachioradialis 2+, right brachioradialis 2+, left patella 2+, right patella 2+, left ankle jerk 2+, right ankle jerk 2+   Plantar Reflex: Toes downgoing to plantar stimulation on the left. Toes downgoing to plantar stimulation on the right.   Sensory Exam: Impaired to light touch and pinprick both in the hands and legs in the glove and stocking type.  Coordination: There is no limb dystaxia and rapid alternating movements are intact.  Gait: Gait is ataxic but she was able to ambulate without any assistance.    ASSESSMENT/PLAN  Diagnoses and all orders for this visit:  Arnold-Chiari syndrome without spina bifida or hydrocephalus (Multi)  Partial symptomatic epilepsy with complex partial seizures, intractable, without status epilepticus (Multi)  -     topiramate (Topamax) 50 mg tablet; Take 1 tablet (50 mg) by mouth 2 times a day.  -     brivaracetam (Briviact) 100 mg tablet tablet; Take 2 tablets (200 mg) by mouth 2 times a day.  Syringomyelia (Multi)  Chronic tension-type headache, intractable        Lenny Mcdaniels MD  5/8/2024  2:03 PM

## 2024-05-24 ENCOUNTER — HOSPITAL ENCOUNTER (OUTPATIENT)
Dept: CARDIOLOGY | Facility: HOSPITAL | Age: 49
Discharge: HOME | End: 2024-05-24
Payer: COMMERCIAL

## 2024-05-24 DIAGNOSIS — Z95.818 PRESENCE OF OTHER CARDIAC IMPLANTS AND GRAFTS: ICD-10-CM

## 2024-05-24 DIAGNOSIS — R00.2 PALPITATIONS: ICD-10-CM

## 2024-05-24 PROCEDURE — 93298 REM INTERROG DEV EVAL SCRMS: CPT | Performed by: INTERNAL MEDICINE

## 2024-05-24 PROCEDURE — 93298 REM INTERROG DEV EVAL SCRMS: CPT

## 2024-06-12 ENCOUNTER — HOSPITAL ENCOUNTER (OUTPATIENT)
Dept: CARDIOLOGY | Facility: HOSPITAL | Age: 49
Discharge: HOME | End: 2024-06-12
Payer: COMMERCIAL

## 2024-06-12 ENCOUNTER — APPOINTMENT (OUTPATIENT)
Dept: CARDIOLOGY | Facility: CLINIC | Age: 49
End: 2024-06-12
Payer: COMMERCIAL

## 2024-06-12 VITALS
DIASTOLIC BLOOD PRESSURE: 60 MMHG | HEIGHT: 65 IN | WEIGHT: 196.4 LBS | HEART RATE: 52 BPM | BODY MASS INDEX: 32.72 KG/M2 | SYSTOLIC BLOOD PRESSURE: 90 MMHG

## 2024-06-12 DIAGNOSIS — I49.5 SICK SINUS SYNDROME (MULTI): ICD-10-CM

## 2024-06-12 DIAGNOSIS — R00.0 WIDE-COMPLEX TACHYCARDIA: Primary | ICD-10-CM

## 2024-06-12 DIAGNOSIS — R00.2 PALPITATIONS: ICD-10-CM

## 2024-06-12 DIAGNOSIS — I49.5 SINUS NODE DYSFUNCTION (MULTI): ICD-10-CM

## 2024-06-12 DIAGNOSIS — F17.200 CURRENT EVERY DAY SMOKER: ICD-10-CM

## 2024-06-12 DIAGNOSIS — R42 DIZZINESS: ICD-10-CM

## 2024-06-12 DIAGNOSIS — N92.6 IRREGULAR MENSES: ICD-10-CM

## 2024-06-12 DIAGNOSIS — R00.1 BRADYCARDIA: ICD-10-CM

## 2024-06-12 DIAGNOSIS — Z45.018 PACEMAKER REPROGRAMMING/CHECK: ICD-10-CM

## 2024-06-12 DIAGNOSIS — Z95.818 STATUS POST PLACEMENT OF IMPLANTABLE LOOP RECORDER: ICD-10-CM

## 2024-06-12 PROCEDURE — 93000 ELECTROCARDIOGRAM COMPLETE: CPT | Mod: DISTINCT PROCEDURAL SERVICE | Performed by: INTERNAL MEDICINE

## 2024-06-12 PROCEDURE — 4010F ACE/ARB THERAPY RXD/TAKEN: CPT | Performed by: INTERNAL MEDICINE

## 2024-06-12 PROCEDURE — 3074F SYST BP LT 130 MM HG: CPT | Performed by: INTERNAL MEDICINE

## 2024-06-12 PROCEDURE — 93291 INTERROG DEV EVAL SCRMS IP: CPT

## 2024-06-12 PROCEDURE — 99215 OFFICE O/P EST HI 40 MIN: CPT | Performed by: INTERNAL MEDICINE

## 2024-06-12 PROCEDURE — 3078F DIAST BP <80 MM HG: CPT | Performed by: INTERNAL MEDICINE

## 2024-06-12 PROCEDURE — 93291 INTERROG DEV EVAL SCRMS IP: CPT | Performed by: INTERNAL MEDICINE

## 2024-06-12 PROCEDURE — 3008F BODY MASS INDEX DOCD: CPT | Performed by: INTERNAL MEDICINE

## 2024-06-12 RX ORDER — CEFAZOLIN SODIUM 2 G/100ML
2 INJECTION, SOLUTION INTRAVENOUS ONCE
OUTPATIENT
Start: 2024-06-12 | End: 2024-06-12

## 2024-06-12 RX ORDER — MUPIROCIN 20 MG/G
1 OINTMENT TOPICAL ONCE
OUTPATIENT
Start: 2024-06-12 | End: 2024-06-12

## 2024-06-12 RX ORDER — CHLORHEXIDINE GLUCONATE 40 MG/ML
SOLUTION TOPICAL ONCE
OUTPATIENT
Start: 2024-06-12 | End: 2024-06-12

## 2024-06-12 RX ORDER — SODIUM CHLORIDE 9 MG/ML
100 INJECTION, SOLUTION INTRAVENOUS CONTINUOUS
OUTPATIENT
Start: 2024-06-12

## 2024-06-12 ASSESSMENT — ENCOUNTER SYMPTOMS
LIGHT-HEADEDNESS: 1
DIZZINESS: 1

## 2024-06-12 NOTE — H&P (VIEW-ONLY)
CARDIOLOGY OFFICE VISIT      CHIEF COMPLAINT  Chief Complaint   Patient presents with    Follow-up     Pt is here today following up with [facility]        HISTORY OF PRESENT ILLNESS  HPI  48-year-old female with a past medical history of cystic compulsive disorder and also history of Chiari malformation with brain surgery done approximately a year ago. Patient has been followed by gynecology service since August last year after patient was admitted to outside hospital. 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 Then she got some radiation into the right arm. Then she started noticing right arm and right lower extremity weakness and she went home with those symptoms. At home her blood pressure systolic was found to be in the 80s with heart rates in the 40s. She came to emergency department for an evaluation. During telemetry patient states that her heart rate was always bradycardic. She was referred for cardiology for an evaluation. She had an a stress test and echocardiogram and also a Holter monitor that were unremarkable.     Echocardiogram in February 2023 shows left ventricular ejection fraction of 60 to 65% with no significant valvular normalities. Holter monitor in December 2022 shows underlying rhythm of sinus rhythm with minimum heart rate 45 bpm maximal heart rate of 129 beats per hours heart of 64 bpm. There were brief episodes of nonsustained supraventricular tachycardia up to 4 beats of duration. Asymptomatic. No evidence of heart block or atrial fibrillation. Had a stress test in February 2023 shows no evidence of ischemia with a left ventricular ejection fraction of 69%.     Due to persisting of symptoms of palpitations chest discomfort and sometimes diaphoresis an event monitor was ordered in May 2023. Event monitor shows underlying rhythm was sinus rhythm. There were 23 triggered events with symptoms that they were not  specified. 1 of these episodes were related with wide-complex tachycardia total of 9 beats that occurred on April 17, 2023 at 1 in the morning. rate of 173 bpm.     Based on the results of the event monitor a cardiac MRI was done that shows left ventricular ejection fraction of 62% with delayed enhancement normal. No significant valvular normalities with mild asymmetric left ventricular hypertrophy.      Patient with implantation of a loop recorder in February 2, 2024 with no complications.    Patient states that she continues having episodes of dizziness most of the times with postural changes but sometimes when she is not doing too many activities.    Loop recorder interrogation has been showing episodes of sinus bradycardia with pauses up to 5 seconds of duration multiple times for the last 2 months.  These episodes happen between afternoon-early evening.    EKG performed today shows sinus bradycardia rate of 52 bpm QRS duration 90 ms QT corrected 480 ms.  Rhythm strip shows the same pattern.      Past Medical History  Past Medical History:   Diagnosis Date    Asthma ([facility]     Current smoker     Diabetes mellitus (Multi)     Disease of thyroid gland     History of Chiari malformation     Hypercortisolism (Multi) 09/21/2023    Hyperlipidemia     Hypertension     OCD (obsessive compulsive disorder)     Other disorders of lung     Lung trouble    Personal history of other diseases of the digestive system     History of gastroesophageal reflux (GERD)    Personal history of other diseases of the musculoskeletal system and connective tissue     History of arthritis    Personal history of other diseases of the nervous system and sense organs     History of sleep apnea    Personal history of other diseases of the respiratory system     History of chronic obstructive lung disease    Personal history of other diseases of the respiratory system     History of bronchitis    Personal history of other diseases of the  respiratory system     History of asthma    Personal history of other endocrine, nutritional and metabolic disease     History of diabetes mellitus    Personal history of other endocrine, nutritional and metabolic disease     History of thyroid disorder    Personal history of other mental and behavioral disorders     History of depression    Personal history of other mental and behavioral disorders     History of mental disorder    Personal history of other specified conditions     History of snoring       Social History  Social History     Tobacco Use    Smoking status: Every Day     Current packs/day: 0.50     Types: Cigarettes    Smokeless tobacco: Never   Vaping Use    Vaping status: Never Used   Substance Use Topics    Alcohol use: Not Currently     Comment: quit 2008    Drug use: Not Currently     Types: Marijuana       Family History     Family History   Problem Relation Name Age of Onset    Hypertension Mother      Arthritis Mother      No Known Problems Father          Allergies:  Allergies   Allergen Reactions    Duloxetine Unknown    Pregabalin Unknown    Shellfish Derived Unknown        Outpatient Medications:  Current Outpatient Medications   Medication Instructions    albuterol (Ventolin HFA) 90 mcg/actuation inhaler 2 puffs, inhalation, Every 6 hours PRN    aspirin 81 mg, oral, Daily    atorvastatin (LIPITOR) 80 mg, oral, Nightly    blood sugar diagnostic (OneTouch Verio test strips) strip Test blood sugars 4 times a day as directed    brivaracetam (BRIVIACT) 200 mg, oral, 2 times daily    buPROPion SR (Wellbutrin SR) 150 mg 12 hr tablet     cholecalciferol (Vitamin D-3) 125 MCG (5000 UT) capsule 1 capsule, oral, Once Weekly, Once weekly on friday    clotrimazole (Lotrimin) 1 % cream 1 Application, Topical, 2 times daily    diclofenac sodium (Voltaren) 1 % gel gel APPLY 4 INCHES OF MEDICATION TO EACH KNEE EVERY 4 HOURS AS NEEDED    dulaglutide (TRULICITY) 0.75 mg, subcutaneous, Once Weekly     ergocalciferol (VITAMIN D-2) 1,250 mcg, oral, Once Weekly    Fycompa 4 mg, oral, Nightly    gabapentin (NEURONTIN) 1,200 mg, oral, 3 times daily    lamoTRIgine (LAMICTAL) 200 mg, oral, Daily    lisinopril 5 mg, oral, Daily    metoprolol tartrate (LOPRESSOR) 25 mg, oral, 2 times daily    montelukast (SINGULAIR) 10 mg, oral, Nightly    ondansetron (ZOFRAN) 8 mg, oral, Every 8 hours PRN    pantoprazole (ProtoNix) 40 mg EC tablet 1 tablet, oral, 2 times daily    topiramate (TOPAMAX) 50 mg, oral, 2 times daily    umeclidinium-vilanteroL (Anoro Ellipta) 62.5-25 mcg/actuation blister with device 1 puff, inhalation, Daily    valACYclovir (VALTREX) 500 mg, oral, Daily          REVIEW OF SYSTEMS  Review of Systems   Neurological:  Positive for dizziness and light-headedness.         VITALS  Vitals:    06/12/24 0941   BP: 90/60   Pulse: 52       PHYSICAL EXAM  Constitutional:       General: Awake.      Appearance: Normal and healthy appearance. Well-developed and not in distress.   Neck:      Vascular: No JVR. JVD normal.   Pulmonary:      Effort: Pulmonary effort is normal.      Breath sounds: Normal breath sounds. No wheezing. No rhonchi. No rales.   Chest:      Chest wall: Not tender to palpatation.      Comments: Loop recorder in place  Cardiovascular:      PMI at left midclavicular line. Bradycardia present. Regular rhythm. Normal S1. Normal S2.       Murmurs: There is no murmur.      No gallop.  No click. No rub.   Pulses:     Intact distal pulses.   Edema:     Peripheral edema absent.   Abdominal:      Tenderness: There is no abdominal tenderness.   Musculoskeletal: Normal range of motion.         General: No tenderness. Skin:     General: Skin is warm and dry.   Neurological:      General: No focal deficit present.      Mental Status: Alert and oriented to person, place and time.           ASSESSMENT AND PLAN  Clinical impression     1. Evidence of bradycardia during admission in outside hospital.  2. Palpitations  3.  Evidence of nonsustained ventricular tachycardia on event monitor  4. Normal left ventricular function per echocardiogram in 2023 and cardiac MRI in 2023  5. No evidence of ischemia per stress test in 2023  6. 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 Obsessive-compulsive disorder  8.  Status post loop recorder implantation in February 2024 with no complications  9.  Evidence of pauses that may be related with near syncope.  This was discussed with patient and family members during this office visit    Plan recommendations    I had a lengthy discussion with patient and family member regarding findings of the loop recorder.  Patient is having significant pauses up to 5 seconds duration that may be related with near syncope.  Patient also understood that episodes of near syncope may be unrelated with this.  Somewhat in could be related to postural changes/neuromediated syncope.  Definitely patient will benefit of a dual-chamber pacemaker and removal of loop recorder.   Procedure, risk, benefits and possible complications were explained to patient.  All questions were answered.  Patient agrees with plan.  Informed consent was signed.    Patient is to hold Trulicity for 7 days.    Get echocardiogram for left ventricular ejection fraction relation prior to pacemaker.    Follow my office 7 days postprocedure for wound assessment.    Risk factor modification and lifestyle modification discussed with patient. Diet , exercise and hydration discussed with patient.    I have personally review with patient during this office visit, laboratory data, echocardiogram results, stress test results, Holter-event monitor results prior and after the last electrophysiology visit. All questions has been answered.    Please excuse any errors in grammar or translation related to this dictation.  Voice recognition software was utilized to prepare this document.

## 2024-06-12 NOTE — PATIENT INSTRUCTIONS
Continue same medications/treatment.  Patient educated on proper medication use.  Patient educated on risk factor modification.  Please bring any lab results from other providers/physicians to your next appointment.    Please bring all medicines, vitamins, and herbal supplements with you when you come to the office.    Prescriptions will not be filled unless you are compliant with your follow up appointments or have a follow up appointment scheduled as per instruction of your physician. Refills should be requested at the time of your visit.    SCHEDULE Echocardiogram  SCHEDULE Dual chamber PPM implant. Obtain labs 1 week prior to procedure. Orders are in the system.  HOLD Trulicity for 1 week prior to procedure.    Lucía GALLEGO RN, AM SCRIBING FOR, AND IN THE PRESENCE OF DR. JEFF JAMESON MD

## 2024-06-12 NOTE — PROGRESS NOTES
CARDIOLOGY OFFICE VISIT      CHIEF COMPLAINT  Chief Complaint   Patient presents with    Follow-up     Pt is here today following up with [facility]        HISTORY OF PRESENT ILLNESS  HPI  48-year-old female with a past medical history of cystic compulsive disorder and also history of Chiari malformation with brain surgery done approximately a year ago. Patient has been followed by gynecology service since August last year after patient was admitted to outside hospital. 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 Then she got some radiation into the right arm. Then she started noticing right arm and right lower extremity weakness and she went home with those symptoms. At home her blood pressure systolic was found to be in the 80s with heart rates in the 40s. She came to emergency department for an evaluation. During telemetry patient states that her heart rate was always bradycardic. She was referred for cardiology for an evaluation. She had an a stress test and echocardiogram and also a Holter monitor that were unremarkable.     Echocardiogram in February 2023 shows left ventricular ejection fraction of 60 to 65% with no significant valvular normalities. Holter monitor in December 2022 shows underlying rhythm of sinus rhythm with minimum heart rate 45 bpm maximal heart rate of 129 beats per hours heart of 64 bpm. There were brief episodes of nonsustained supraventricular tachycardia up to 4 beats of duration. Asymptomatic. No evidence of heart block or atrial fibrillation. Had a stress test in February 2023 shows no evidence of ischemia with a left ventricular ejection fraction of 69%.     Due to persisting of symptoms of palpitations chest discomfort and sometimes diaphoresis an event monitor was ordered in May 2023. Event monitor shows underlying rhythm was sinus rhythm. There were 23 triggered events with symptoms that they were not  specified. 1 of these episodes were related with wide-complex tachycardia total of 9 beats that occurred on April 17, 2023 at 1 in the morning. rate of 173 bpm.     Based on the results of the event monitor a cardiac MRI was done that shows left ventricular ejection fraction of 62% with delayed enhancement normal. No significant valvular normalities with mild asymmetric left ventricular hypertrophy.      Patient with implantation of a loop recorder in February 2, 2024 with no complications.    Patient states that she continues having episodes of dizziness most of the times with postural changes but sometimes when she is not doing too many activities.    Loop recorder interrogation has been showing episodes of sinus bradycardia with pauses up to 5 seconds of duration multiple times for the last 2 months.  These episodes happen between afternoon-early evening.    EKG performed today shows sinus bradycardia rate of 52 bpm QRS duration 90 ms QT corrected 480 ms.  Rhythm strip shows the same pattern.      Past Medical History  Past Medical History:   Diagnosis Date    Asthma ([facility])     Current smoker     Diabetes mellitus (Multi)     Disease of thyroid gland     History of Chiari malformation     Hypercortisolism (Multi) 09/21/2023    Hyperlipidemia     Hypertension     OCD (obsessive compulsive disorder)     Other disorders of lung     Lung trouble    Personal history of other diseases of the digestive system     History of gastroesophageal reflux (GERD)    Personal history of other diseases of the musculoskeletal system and connective tissue     History of arthritis    Personal history of other diseases of the nervous system and sense organs     History of sleep apnea    Personal history of other diseases of the respiratory system     History of chronic obstructive lung disease    Personal history of other diseases of the respiratory system     History of bronchitis    Personal history of other diseases of the  respiratory system     History of asthma    Personal history of other endocrine, nutritional and metabolic disease     History of diabetes mellitus    Personal history of other endocrine, nutritional and metabolic disease     History of thyroid disorder    Personal history of other mental and behavioral disorders     History of depression    Personal history of other mental and behavioral disorders     History of mental disorder    Personal history of other specified conditions     History of snoring       Social History  Social History     Tobacco Use    Smoking status: Every Day     Current packs/day: 0.50     Types: Cigarettes    Smokeless tobacco: Never   Vaping Use    Vaping status: Never Used   Substance Use Topics    Alcohol use: Not Currently     Comment: quit 2008    Drug use: Not Currently     Types: Marijuana       Family History     Family History   Problem Relation Name Age of Onset    Hypertension Mother      Arthritis Mother      No Known Problems Father          Allergies:  Allergies   Allergen Reactions    Duloxetine Unknown    Pregabalin Unknown    Shellfish Derived Unknown        Outpatient Medications:  Current Outpatient Medications   Medication Instructions    albuterol (Ventolin HFA) 90 mcg/actuation inhaler 2 puffs, inhalation, Every 6 hours PRN    aspirin 81 mg, oral, Daily    atorvastatin (LIPITOR) 80 mg, oral, Nightly    blood sugar diagnostic (OneTouch Verio test strips) strip Test blood sugars 4 times a day as directed    brivaracetam (BRIVIACT) 200 mg, oral, 2 times daily    buPROPion SR (Wellbutrin SR) 150 mg 12 hr tablet     cholecalciferol (Vitamin D-3) 125 MCG (5000 UT) capsule 1 capsule, oral, Once Weekly, Once weekly on friday    clotrimazole (Lotrimin) 1 % cream 1 Application, Topical, 2 times daily    diclofenac sodium (Voltaren) 1 % gel gel APPLY 4 INCHES OF MEDICATION TO EACH KNEE EVERY 4 HOURS AS NEEDED    dulaglutide (TRULICITY) 0.75 mg, subcutaneous, Once Weekly     ergocalciferol (VITAMIN D-2) 1,250 mcg, oral, Once Weekly    Fycompa 4 mg, oral, Nightly    gabapentin (NEURONTIN) 1,200 mg, oral, 3 times daily    lamoTRIgine (LAMICTAL) 200 mg, oral, Daily    lisinopril 5 mg, oral, Daily    metoprolol tartrate (LOPRESSOR) 25 mg, oral, 2 times daily    montelukast (SINGULAIR) 10 mg, oral, Nightly    ondansetron (ZOFRAN) 8 mg, oral, Every 8 hours PRN    pantoprazole (ProtoNix) 40 mg EC tablet 1 tablet, oral, 2 times daily    topiramate (TOPAMAX) 50 mg, oral, 2 times daily    umeclidinium-vilanteroL (Anoro Ellipta) 62.5-25 mcg/actuation blister with device 1 puff, inhalation, Daily    valACYclovir (VALTREX) 500 mg, oral, Daily          REVIEW OF SYSTEMS  Review of Systems   Neurological:  Positive for dizziness and light-headedness.         VITALS  Vitals:    06/12/24 0941   BP: 90/60   Pulse: 52       PHYSICAL EXAM  Constitutional:       General: Awake.      Appearance: Normal and healthy appearance. Well-developed and not in distress.   Neck:      Vascular: No JVR. JVD normal.   Pulmonary:      Effort: Pulmonary effort is normal.      Breath sounds: Normal breath sounds. No wheezing. No rhonchi. No rales.   Chest:      Chest wall: Not tender to palpatation.      Comments: Loop recorder in place  Cardiovascular:      PMI at left midclavicular line. Bradycardia present. Regular rhythm. Normal S1. Normal S2.       Murmurs: There is no murmur.      No gallop.  No click. No rub.   Pulses:     Intact distal pulses.   Edema:     Peripheral edema absent.   Abdominal:      Tenderness: There is no abdominal tenderness.   Musculoskeletal: Normal range of motion.         General: No tenderness. Skin:     General: Skin is warm and dry.   Neurological:      General: No focal deficit present.      Mental Status: Alert and oriented to person, place and time.           ASSESSMENT AND PLAN  Clinical impression     1. Evidence of bradycardia during admission in outside hospital.  2. Palpitations  3.  Evidence of nonsustained ventricular tachycardia on event monitor  4. Normal left ventricular function per echocardiogram in 2023 and cardiac MRI in 2023  5. No evidence of ischemia per stress test in 2023  6. 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 Obsessive-compulsive disorder  8.  Status post loop recorder implantation in February 2024 with no complications  9.  Evidence of pauses that may be related with near syncope.  This was discussed with patient and family members during this office visit    Plan recommendations    I had a lengthy discussion with patient and family member regarding findings of the loop recorder.  Patient is having significant pauses up to 5 seconds duration that may be related with near syncope.  Patient also understood that episodes of near syncope may be unrelated with this.  Somewhat in could be related to postural changes/neuromediated syncope.  Definitely patient will benefit of a dual-chamber pacemaker and removal of loop recorder.   Procedure, risk, benefits and possible complications were explained to patient.  All questions were answered.  Patient agrees with plan.  Informed consent was signed.    Patient is to hold Trulicity for 7 days.    Get echocardiogram for left ventricular ejection fraction relation prior to pacemaker.    Follow my office 7 days postprocedure for wound assessment.    Risk factor modification and lifestyle modification discussed with patient. Diet , exercise and hydration discussed with patient.    I have personally review with patient during this office visit, laboratory data, echocardiogram results, stress test results, Holter-event monitor results prior and after the last electrophysiology visit. All questions has been answered.    Please excuse any errors in grammar or translation related to this dictation.  Voice recognition software was utilized to prepare this document.

## 2024-06-17 DIAGNOSIS — G40.219 PARTIAL SYMPTOMATIC EPILEPSY WITH COMPLEX PARTIAL SEIZURES, INTRACTABLE, WITHOUT STATUS EPILEPTICUS (MULTI): ICD-10-CM

## 2024-06-18 LAB
NON-UH HIE ANION GAP: 12 (ref 6–15)
NON-UH HIE BASOPHILS # (AUTO): 0.1 10*3/UL (ref 0–0.2)
NON-UH HIE BASOPHILS % (AUTO): 1.1 %
NON-UH HIE BLOOD UREA NITROGEN: 16 MG/DL (ref 7–25)
NON-UH HIE CALCIUM: 9.3 MG/DL (ref 8.6–10.3)
NON-UH HIE CARBON DIOXIDE: 20.9 MMOL/L (ref 21–31)
NON-UH HIE CHLORIDE: 109 MMOL/L (ref 98–107)
NON-UH HIE CREATININE: 0.76 MG/DL (ref 0.6–1.2)
NON-UH HIE EOSINOPHILS # (AUTO): 0.2 10*3/UL (ref 0–0.45)
NON-UH HIE EOSINOPHILS % (AUTO): 1.7 %
NON-UH HIE ESTIMATED GFR: > 60
NON-UH HIE GLUCOSE: 169 MG/DL (ref 70–100)
NON-UH HIE HEMATOCRIT: 49.9 % (ref 34–46.4)
NON-UH HIE HEMOGLOBIN: 16.6 G/DL (ref 11.8–15.4)
NON-UH HIE INR: 1
NON-UH HIE LYMPHOCYTES # (AUTO): 3.2 10*3/UL (ref 1–4.8)
NON-UH HIE LYMPHOCYTES % (AUTO): 28.1 %
NON-UH HIE MEAN CORPUSCULAR HEMOGLOBIN: 33.5 PG (ref 24.7–34.3)
NON-UH HIE MEAN CORPUSCULAR HGB CONC: 33.2 G/DL (ref 32–35)
NON-UH HIE MEAN CORPUSCULAR VOLUME: 100.9 FL (ref 80–100)
NON-UH HIE MEAN PLATELET VOLUME: 10.3 FL (ref 6.3–10.7)
NON-UH HIE MONOCYTES # (AUTO): 0.6 10*3/UL (ref 0–0.8)
NON-UH HIE MONOCYTES % (AUTO): 5.1 %
NON-UH HIE NEUTROPHILS # (AUTO): 7.4 10*3/UL (ref 1.8–7.7)
NON-UH HIE NEUTROPHILS % (AUTO): 64 %
NON-UH HIE NRBC%: 0.1 /100{WBC} (ref 0–0.5)
NON-UH HIE PARTIAL THROMBOPLASTIN TIME: 32.1 S (ref 25.1–36.5)
NON-UH HIE PLATELET COUNT: 231 10*3/UL (ref 150–450)
NON-UH HIE POTASSIUM: 3.9 MMOL/L (ref 3.5–5.1)
NON-UH HIE PROTHROMBIN TIME: 11.3 S (ref 9–12.9)
NON-UH HIE RED BLOOD COUNT: 4.94 (ref 3.6–5)
NON-UH HIE RED CELL DISTRIBUTION WIDTH: 14.4 % (ref 11.9–15.3)
NON-UH HIE SODIUM: 138 MMOL/L (ref 136–145)
NON-UH HIE UNCORRECTED WBC: 11.6 10*3/UL (ref 3.8–11.6)
NON-UH HIE WHITE BLOOD COUNT: 11.6 10*3/UL (ref 3.8–11.6)

## 2024-06-18 RX ORDER — PERAMPANEL 4 MG/1
4 TABLET ORAL NIGHTLY
Qty: 30 TABLET | Refills: 3 | Status: SHIPPED | OUTPATIENT
Start: 2024-06-18

## 2024-06-19 ENCOUNTER — TELEPHONE (OUTPATIENT)
Dept: CARDIOLOGY | Facility: CLINIC | Age: 49
End: 2024-06-19
Payer: COMMERCIAL

## 2024-06-19 DIAGNOSIS — I49.5 SICK SINUS SYNDROME (MULTI): ICD-10-CM

## 2024-06-19 DIAGNOSIS — Z95.818 STATUS POST PLACEMENT OF IMPLANTABLE LOOP RECORDER: ICD-10-CM

## 2024-06-19 NOTE — TELEPHONE ENCOUNTER
Patient called and LM that someone called to fax her orders for her echo to a hospital of her choice. [facility] so whichever one she can get into the quickest would be the best place to send it. This nurse attempted to find the person who called the patient but there is no note in the chart. Routed to [place] [id number] [facility] for follow up in faxing orders.

## 2024-06-21 NOTE — TELEPHONE ENCOUNTER
Patient called and LM asking about her echo. Per BLANCA Huber, order can be changed to a stat echo on admit prior to the pacemaker implant. Advised the patient of this and she verbalized understanding. Routed to BLANCA Huber for signing.

## 2024-06-25 ENCOUNTER — APPOINTMENT (OUTPATIENT)
Dept: RADIOLOGY | Facility: HOSPITAL | Age: 49
End: 2024-06-25
Payer: COMMERCIAL

## 2024-06-25 ENCOUNTER — APPOINTMENT (OUTPATIENT)
Dept: CARDIOLOGY | Facility: HOSPITAL | Age: 49
End: 2024-06-25
Payer: COMMERCIAL

## 2024-06-25 ENCOUNTER — HOSPITAL ENCOUNTER (OUTPATIENT)
Facility: HOSPITAL | Age: 49
Discharge: HOME | End: 2024-06-25
Attending: INTERNAL MEDICINE | Admitting: INTERNAL MEDICINE
Payer: COMMERCIAL

## 2024-06-25 ENCOUNTER — HOSPITAL ENCOUNTER (OUTPATIENT)
Dept: CARDIOLOGY | Facility: HOSPITAL | Age: 49
Discharge: HOME | End: 2024-06-25
Payer: COMMERCIAL

## 2024-06-25 VITALS
SYSTOLIC BLOOD PRESSURE: 120 MMHG | HEIGHT: 65 IN | DIASTOLIC BLOOD PRESSURE: 63 MMHG | TEMPERATURE: 95.9 F | OXYGEN SATURATION: 95 % | HEART RATE: 60 BPM | WEIGHT: 199.52 LBS | RESPIRATION RATE: 16 BRPM | BODY MASS INDEX: 33.24 KG/M2

## 2024-06-25 DIAGNOSIS — G89.18 POSTOPERATIVE PAIN: ICD-10-CM

## 2024-06-25 DIAGNOSIS — I49.5 SINUS NODE DYSFUNCTION (MULTI): Primary | ICD-10-CM

## 2024-06-25 DIAGNOSIS — R00.1 BRADYCARDIA: ICD-10-CM

## 2024-06-25 DIAGNOSIS — I47.29 NSVT (NONSUSTAINED VENTRICULAR TACHYCARDIA) (MULTI): ICD-10-CM

## 2024-06-25 DIAGNOSIS — I49.5 SICK SINUS SYNDROME (MULTI): ICD-10-CM

## 2024-06-25 DIAGNOSIS — Z95.0 PACEMAKER: ICD-10-CM

## 2024-06-25 DIAGNOSIS — R42 DIZZINESS: ICD-10-CM

## 2024-06-25 DIAGNOSIS — Z01.810 ENCOUNTER FOR PREPROCEDURAL CARDIOVASCULAR EXAMINATION: ICD-10-CM

## 2024-06-25 LAB
ANION GAP SERPL CALC-SCNC: 11 MMOL/L (ref 10–20)
AORTIC VALVE MEAN GRADIENT: 3 MMHG
AORTIC VALVE PEAK VELOCITY: 1.16 M/S
APTT PPP: 32 SECONDS (ref 27–38)
AV PEAK GRADIENT: 5.4 MMHG
AVA (PEAK VEL): 2.26 CM2
AVA (VTI): 2.31 CM2
B-HCG SERPL-ACNC: 2 MIU/ML
BUN SERPL-MCNC: 13 MG/DL (ref 6–23)
CALCIUM SERPL-MCNC: 8.1 MG/DL (ref 8.6–10.3)
CHLORIDE SERPL-SCNC: 111 MMOL/L (ref 98–107)
CO2 SERPL-SCNC: 20 MMOL/L (ref 21–32)
CREAT SERPL-MCNC: 0.64 MG/DL (ref 0.5–1.05)
EGFRCR SERPLBLD CKD-EPI 2021: >90 ML/MIN/1.73M*2
EJECTION FRACTION APICAL 4 CHAMBER: 56.1
GLUCOSE SERPL-MCNC: 142 MG/DL (ref 74–99)
INR PPP: 1 (ref 0.9–1.1)
LEFT ATRIUM VOLUME AREA LENGTH INDEX BSA: 19 ML/M2
LEFT VENTRICLE INTERNAL DIMENSION DIASTOLE: 4.62 CM (ref 3.5–6)
LEFT VENTRICULAR OUTFLOW TRACT DIAMETER: 1.98 CM
LV EJECTION FRACTION BIPLANE: 58 %
MITRAL VALVE E/A RATIO: 2.12
POTASSIUM SERPL-SCNC: 3.8 MMOL/L (ref 3.5–5.3)
PROTHROMBIN TIME: 11.6 SECONDS (ref 9.8–12.8)
RIGHT VENTRICLE FREE WALL PEAK S': 17.7 CM/S
SODIUM SERPL-SCNC: 138 MMOL/L (ref 136–145)
TRICUSPID ANNULAR PLANE SYSTOLIC EXCURSION: 2.8 CM

## 2024-06-25 PROCEDURE — 2500000005 HC RX 250 GENERAL PHARMACY W/O HCPCS: Performed by: INTERNAL MEDICINE

## 2024-06-25 PROCEDURE — 99153 MOD SED SAME PHYS/QHP EA: CPT | Performed by: INTERNAL MEDICINE

## 2024-06-25 PROCEDURE — 7100000009 HC PHASE TWO TIME - INITIAL BASE CHARGE: Performed by: INTERNAL MEDICINE

## 2024-06-25 PROCEDURE — 93306 TTE W/DOPPLER COMPLETE: CPT

## 2024-06-25 PROCEDURE — 33208 INSRT HEART PM ATRIAL & VENT: CPT | Performed by: INTERNAL MEDICINE

## 2024-06-25 PROCEDURE — 93291 INTERROG DEV EVAL SCRMS IP: CPT | Performed by: INTERNAL MEDICINE

## 2024-06-25 PROCEDURE — 2750000001 HC OR 275 NO HCPCS: Performed by: INTERNAL MEDICINE

## 2024-06-25 PROCEDURE — 7100000010 HC PHASE TWO TIME - EACH INCREMENTAL 1 MINUTE: Performed by: INTERNAL MEDICINE

## 2024-06-25 PROCEDURE — 85610 PROTHROMBIN TIME: CPT | Performed by: NURSE PRACTITIONER

## 2024-06-25 PROCEDURE — C1785 PMKR, DUAL, RATE-RESP: HCPCS | Performed by: INTERNAL MEDICINE

## 2024-06-25 PROCEDURE — 2720000007 HC OR 272 NO HCPCS: Performed by: INTERNAL MEDICINE

## 2024-06-25 PROCEDURE — 93306 TTE W/DOPPLER COMPLETE: CPT | Performed by: INTERNAL MEDICINE

## 2024-06-25 PROCEDURE — 7100000011 HC EXTENDED STAY RECOVERY HOURLY - NURSING UNIT

## 2024-06-25 PROCEDURE — 93285 PRGRMG DEV EVAL SCRMS IP: CPT | Performed by: INTERNAL MEDICINE

## 2024-06-25 PROCEDURE — 2780000003 HC OR 278 NO HCPCS: Performed by: INTERNAL MEDICINE

## 2024-06-25 PROCEDURE — 71045 X-RAY EXAM CHEST 1 VIEW: CPT | Performed by: RADIOLOGY

## 2024-06-25 PROCEDURE — 2500000004 HC RX 250 GENERAL PHARMACY W/ HCPCS (ALT 636 FOR OP/ED): Performed by: NURSE PRACTITIONER

## 2024-06-25 PROCEDURE — 2500000001 HC RX 250 WO HCPCS SELF ADMINISTERED DRUGS (ALT 637 FOR MEDICARE OP): Performed by: NURSE PRACTITIONER

## 2024-06-25 PROCEDURE — 99152 MOD SED SAME PHYS/QHP 5/>YRS: CPT | Performed by: INTERNAL MEDICINE

## 2024-06-25 PROCEDURE — 93010 ELECTROCARDIOGRAM REPORT: CPT | Performed by: INTERNAL MEDICINE

## 2024-06-25 PROCEDURE — 84702 CHORIONIC GONADOTROPIN TEST: CPT | Performed by: NURSE PRACTITIONER

## 2024-06-25 PROCEDURE — 93005 ELECTROCARDIOGRAM TRACING: CPT

## 2024-06-25 PROCEDURE — 71045 X-RAY EXAM CHEST 1 VIEW: CPT

## 2024-06-25 PROCEDURE — C1898 LEAD, PMKR, OTHER THAN TRANS: HCPCS | Performed by: INTERNAL MEDICINE

## 2024-06-25 PROCEDURE — 33286 RMVL SUBQ CAR RHYTHM MNTR: CPT | Performed by: INTERNAL MEDICINE

## 2024-06-25 PROCEDURE — 36415 COLL VENOUS BLD VENIPUNCTURE: CPT | Performed by: NURSE PRACTITIONER

## 2024-06-25 PROCEDURE — 93005 ELECTROCARDIOGRAM TRACING: CPT | Mod: 59

## 2024-06-25 PROCEDURE — 2500000004 HC RX 250 GENERAL PHARMACY W/ HCPCS (ALT 636 FOR OP/ED): Performed by: INTERNAL MEDICINE

## 2024-06-25 PROCEDURE — C1892 INTRO/SHEATH,FIXED,PEEL-AWAY: HCPCS | Performed by: INTERNAL MEDICINE

## 2024-06-25 PROCEDURE — 82374 ASSAY BLOOD CARBON DIOXIDE: CPT | Performed by: NURSE PRACTITIONER

## 2024-06-25 DEVICE — IPG W1DR01 AZURE XT DR MRI WL USA BCP
Type: IMPLANTABLE DEVICE | Site: CHEST | Status: FUNCTIONAL
Brand: AZURE™ XT DR MRI SURESCAN™

## 2024-06-25 DEVICE — LEAD 5076-52 CAPSUREFIX NOVUS US EN
Type: IMPLANTABLE DEVICE | Site: CHEST | Status: FUNCTIONAL
Brand: CAPSUREFIX® NOVUS

## 2024-06-25 DEVICE — LEAD 5076-45 CAPSUREFIX NOVUS US EN
Type: IMPLANTABLE DEVICE | Site: CHEST | Status: FUNCTIONAL
Brand: CAPSUREFIX® NOVUS

## 2024-06-25 RX ORDER — GABAPENTIN 400 MG/1
1200 CAPSULE ORAL 3 TIMES DAILY
Status: CANCELLED | OUTPATIENT
Start: 2024-06-25

## 2024-06-25 RX ORDER — NAPROXEN SODIUM 220 MG/1
81 TABLET, FILM COATED ORAL DAILY
Status: CANCELLED | OUTPATIENT
Start: 2024-06-26

## 2024-06-25 RX ORDER — IBUPROFEN 400 MG/1
800 TABLET ORAL EVERY 8 HOURS PRN
Status: DISCONTINUED | OUTPATIENT
Start: 2024-06-25 | End: 2024-06-25 | Stop reason: HOSPADM

## 2024-06-25 RX ORDER — LAMOTRIGINE 100 MG/1
200 TABLET ORAL DAILY
Status: CANCELLED | OUTPATIENT
Start: 2024-06-26

## 2024-06-25 RX ORDER — MONTELUKAST SODIUM 10 MG/1
10 TABLET ORAL NIGHTLY
Status: CANCELLED | OUTPATIENT
Start: 2024-06-25

## 2024-06-25 RX ORDER — MIDAZOLAM HYDROCHLORIDE 1 MG/ML
INJECTION, SOLUTION INTRAMUSCULAR; INTRAVENOUS AS NEEDED
Status: DISCONTINUED | OUTPATIENT
Start: 2024-06-25 | End: 2024-06-25 | Stop reason: HOSPADM

## 2024-06-25 RX ORDER — LIDOCAINE HYDROCHLORIDE 10 MG/ML
INJECTION, SOLUTION EPIDURAL; INFILTRATION; INTRACAUDAL; PERINEURAL AS NEEDED
Status: DISCONTINUED | OUTPATIENT
Start: 2024-06-25 | End: 2024-06-25 | Stop reason: HOSPADM

## 2024-06-25 RX ORDER — IBUPROFEN 800 MG/1
800 TABLET ORAL EVERY 8 HOURS PRN
Qty: 30 TABLET | Refills: 0 | Status: SHIPPED | OUTPATIENT
Start: 2024-06-25

## 2024-06-25 RX ORDER — ACETAMINOPHEN 325 MG/1
650 TABLET ORAL EVERY 4 HOURS PRN
Status: DISCONTINUED | OUTPATIENT
Start: 2024-06-25 | End: 2024-06-25 | Stop reason: HOSPADM

## 2024-06-25 RX ORDER — BUPROPION HYDROCHLORIDE 150 MG/1
150 TABLET, EXTENDED RELEASE ORAL 2 TIMES DAILY
Status: CANCELLED | OUTPATIENT
Start: 2024-06-25

## 2024-06-25 RX ORDER — ACETAMINOPHEN 325 MG/1
650 TABLET ORAL EVERY 4 HOURS PRN
Start: 2024-06-25 | End: 2024-06-25 | Stop reason: HOSPADM

## 2024-06-25 RX ORDER — ONDANSETRON 4 MG/1
8 TABLET, FILM COATED ORAL EVERY 8 HOURS PRN
Status: CANCELLED | OUTPATIENT
Start: 2024-06-25

## 2024-06-25 RX ORDER — TRAMADOL HYDROCHLORIDE 50 MG/1
50 TABLET ORAL EVERY 6 HOURS PRN
Status: DISCONTINUED | OUTPATIENT
Start: 2024-06-25 | End: 2024-06-25

## 2024-06-25 RX ORDER — ALBUTEROL SULFATE 90 UG/1
2 AEROSOL, METERED RESPIRATORY (INHALATION) EVERY 6 HOURS PRN
Status: CANCELLED | OUTPATIENT
Start: 2024-06-25

## 2024-06-25 RX ORDER — FENTANYL CITRATE 50 UG/ML
INJECTION, SOLUTION INTRAMUSCULAR; INTRAVENOUS AS NEEDED
Status: DISCONTINUED | OUTPATIENT
Start: 2024-06-25 | End: 2024-06-25 | Stop reason: HOSPADM

## 2024-06-25 RX ORDER — ONDANSETRON HYDROCHLORIDE 2 MG/ML
4 INJECTION, SOLUTION INTRAVENOUS EVERY 8 HOURS PRN
Status: DISCONTINUED | OUTPATIENT
Start: 2024-06-25 | End: 2024-06-25

## 2024-06-25 RX ORDER — TRAMADOL HYDROCHLORIDE 50 MG/1
50 TABLET ORAL EVERY 8 HOURS PRN
Qty: 10 TABLET | Refills: 0 | Status: SHIPPED | OUTPATIENT
Start: 2024-06-25 | End: 2024-06-25 | Stop reason: HOSPADM

## 2024-06-25 RX ORDER — PANTOPRAZOLE SODIUM 40 MG/1
40 TABLET, DELAYED RELEASE ORAL 2 TIMES DAILY
Status: CANCELLED | OUTPATIENT
Start: 2024-06-25

## 2024-06-25 RX ORDER — CHLORHEXIDINE GLUCONATE 40 MG/ML
SOLUTION TOPICAL ONCE
Status: COMPLETED | OUTPATIENT
Start: 2024-06-25 | End: 2024-06-25

## 2024-06-25 RX ORDER — MUPIROCIN 20 MG/G
1 OINTMENT TOPICAL ONCE
Status: COMPLETED | OUTPATIENT
Start: 2024-06-25 | End: 2024-06-25

## 2024-06-25 RX ORDER — SODIUM CHLORIDE 9 MG/ML
10 INJECTION, SOLUTION INTRAVENOUS CONTINUOUS
Status: DISCONTINUED | OUTPATIENT
Start: 2024-06-25 | End: 2024-06-25

## 2024-06-25 RX ORDER — CEFAZOLIN SODIUM 1 G/50ML
1 SOLUTION INTRAVENOUS ONCE
Status: COMPLETED | OUTPATIENT
Start: 2024-06-25 | End: 2024-06-25

## 2024-06-25 RX ORDER — LISINOPRIL 5 MG/1
5 TABLET ORAL DAILY
Status: CANCELLED | OUTPATIENT
Start: 2024-06-26

## 2024-06-25 RX ORDER — METOPROLOL TARTRATE 25 MG/1
25 TABLET, FILM COATED ORAL 2 TIMES DAILY
Status: CANCELLED | OUTPATIENT
Start: 2024-06-25

## 2024-06-25 RX ORDER — ATORVASTATIN CALCIUM 20 MG/1
80 TABLET, FILM COATED ORAL NIGHTLY
Status: CANCELLED | OUTPATIENT
Start: 2024-06-25

## 2024-06-25 RX ORDER — TOPIRAMATE 25 MG/1
50 TABLET ORAL 2 TIMES DAILY
Status: CANCELLED | OUTPATIENT
Start: 2024-06-25

## 2024-06-25 RX ORDER — SODIUM CHLORIDE 9 MG/ML
20 INJECTION, SOLUTION INTRAVENOUS CONTINUOUS
Status: DISCONTINUED | OUTPATIENT
Start: 2024-06-25 | End: 2024-06-25

## 2024-06-25 ASSESSMENT — COLUMBIA-SUICIDE SEVERITY RATING SCALE - C-SSRS
1. IN THE PAST MONTH, HAVE YOU WISHED YOU WERE DEAD OR WISHED YOU COULD GO TO SLEEP AND NOT WAKE UP?: NO
6. HAVE YOU EVER DONE ANYTHING, STARTED TO DO ANYTHING, OR PREPARED TO DO ANYTHING TO END YOUR LIFE?: NO
2. HAVE YOU ACTUALLY HAD ANY THOUGHTS OF KILLING YOURSELF?: NO

## 2024-06-25 ASSESSMENT — PAIN - FUNCTIONAL ASSESSMENT: PAIN_FUNCTIONAL_ASSESSMENT: 0-10

## 2024-06-25 ASSESSMENT — PAIN SCALES - GENERAL: PAINLEVEL_OUTOF10: 0 - NO PAIN

## 2024-06-25 NOTE — NURSING NOTE
Patient discharged to home, ambulated with girlfriend, refused the w/c. Final Lt chest dressing check done at 1830, IV's removed and patient discharge instructions reviewed, patient able to teachback instructions.

## 2024-06-25 NOTE — NURSING NOTE
Patient received from EP lab into room 209, S/P Loop Removal and PPM Implant via left chest. Patient has 2 Aquacell dressings to upper lt chest, both are dry/intact, no ecchymosis, no hematoma. Lt arm in sling. Ice pack to Lt chest. Patient denies any complaints of pain, vital signs stable. Patient's significant other and father at bedside. Post-procedure EKG completed. Tele monitor: 1:1 Atrial paced 100% at 60. Nursing call light within reach.

## 2024-06-25 NOTE — NURSING NOTE
Patient met with Dr. Monson, is discussing plan of care and patient is now asking to go home tonight. Dr. Monson states patient may opt to go home tonight as long as she has her bloodword drawn tomorrow. Now plan is to discharge patient tonight, once device rep meets with her. Dr. Monson will contact [employer] rep.

## 2024-06-25 NOTE — Clinical Note
Patient ID band present and verified. Patient contact is in waiting room. Contact(s) present: significant other.

## 2024-06-25 NOTE — NURSING NOTE
Device check has been completed, all is well. Chest xray has been done. Patient has met all discharge criteria,, ambulated to BR with steady gait.

## 2024-06-26 LAB
NON-UH HIE ERYTHROCYTE DISTRIBUTION WIDTH:RATIO:PT:RBC:QN:AUTOMATED COUNT: 13.9 % (ref 10.9–14.2)
NON-UH HIE ERYTHROCYTE MEAN CORPUSCULAR HEMOGLOBIN CONCENTRATION:MCNC:PT:RB: 33.1 GM/DL (ref 31.4–36)
NON-UH HIE ERYTHROCYTE MEAN CORPUSCULAR HEMOGLOBIN:ENTMASS:PT:RBC:QN:AUTOMA: 33.6 PG (ref 27–34)
NON-UH HIE ERYTHROCYTE MEAN CORPUSCULAR VOLUME:ENTVOL:PT:RBC:QN:AUTOMATED C: 101.6 FL (ref 80–100)
NON-UH HIE ERYTHROCYTE SIZE:MORPH:PT:BLD:NOM:: NORMAL
NON-UH HIE ERYTHROCYTES:NCNC:PT:BLD:QN:AUTOMATED COUNT: 4.8 E12/L (ref 4.3–5.9)
NON-UH HIE HEMATOCRIT:VFR:PT:BLD:QN:AUTOMATED COUNT: 48.3 % (ref 34–46)
NON-UH HIE HEMOGLOBIN:MCNC:PT:BLD:QN:: 16 GM/DL (ref 12–16)
NON-UH HIE LEUKOCYTES: 15.1 E9/L (ref 4–11)
NON-UH HIE PLATELET MEAN VOLUME:ENTVOL:PT:BLD:QN:AUTOMATED COUNT: 10.1 FL (ref 6.4–10.8)
NON-UH HIE PLATELETS:NCNC:PT:BLD:QN:AUTOMATED COUNT: 230 E9/L (ref 150–500)

## 2024-06-27 ENCOUNTER — APPOINTMENT (OUTPATIENT)
Dept: OTOLARYNGOLOGY | Facility: CLINIC | Age: 49
End: 2024-06-27
Payer: COMMERCIAL

## 2024-06-27 DIAGNOSIS — R00.1 BRADYCARDIA: ICD-10-CM

## 2024-06-27 DIAGNOSIS — Z96.22 HISTORY OF TYMPANOSTOMY TUBE PLACEMENT: Primary | ICD-10-CM

## 2024-06-27 DIAGNOSIS — Z95.0 CARDIAC PACEMAKER IN SITU: Primary | ICD-10-CM

## 2024-06-27 LAB
ATRIAL RATE: 52 BPM
ATRIAL RATE: 60 BPM
P AXIS: 44 DEGREES
P AXIS: 96 DEGREES
P OFFSET: 173 MS
P OFFSET: 176 MS
P ONSET: 132 MS
P ONSET: 143 MS
PR INTERVAL: 174 MS
PR INTERVAL: 226 MS
Q ONSET: 214 MS
Q ONSET: 219 MS
QRS COUNT: 10 BEATS
QRS COUNT: 9 BEATS
QRS DURATION: 86 MS
QRS DURATION: 90 MS
QT INTERVAL: 454 MS
QT INTERVAL: 454 MS
QTC CALCULATION(BAZETT): 422 MS
QTC CALCULATION(BAZETT): 454 MS
QTC FREDERICIA: 432 MS
QTC FREDERICIA: 454 MS
R AXIS: 68 DEGREES
R AXIS: 69 DEGREES
T AXIS: 19 DEGREES
T AXIS: 33 DEGREES
T OFFSET: 441 MS
T OFFSET: 446 MS
VENTRICULAR RATE: 52 BPM
VENTRICULAR RATE: 60 BPM

## 2024-06-27 PROCEDURE — 99213 OFFICE O/P EST LOW 20 MIN: CPT | Performed by: OTOLARYNGOLOGY

## 2024-06-27 PROCEDURE — 3008F BODY MASS INDEX DOCD: CPT | Performed by: OTOLARYNGOLOGY

## 2024-06-27 PROCEDURE — 4010F ACE/ARB THERAPY RXD/TAKEN: CPT | Performed by: OTOLARYNGOLOGY

## 2024-06-27 PROCEDURE — 4004F PT TOBACCO SCREEN RCVD TLK: CPT | Performed by: OTOLARYNGOLOGY

## 2024-06-27 NOTE — PROGRESS NOTES
Impression:              1. History of tympanostomy tube placement             Recommendations/Plan:  I reassured the patient that her T-tube is functioning normally and there is no evidence of any infectious drainage.  She will continue to keep all water out of that right ear and if she sees any drainage, she will let us know and we will restart Ciprodex drops.  Otherwise I will see her back in the office in 6 months and recheck her ear    **This electronic medical record note was created with the use of voice recognition software.  Despite proofreading, typographical or grammatical errors may be present that could affect meaning of content **    Subjective:  Ruth returns to the office today as a checkup on her ears.  She denies any drainage from the right ear.  She has been keeping all water out of her ears.  No upper respiratory complaints fever or chills.    Objective:    There were no vitals taken for this visit.     Current Outpatient Medications   Medication Instructions    albuterol (Ventolin HFA) 90 mcg/actuation inhaler 2 puffs, inhalation, Every 6 hours PRN    aspirin 81 mg, oral, Daily    atorvastatin (LIPITOR) 80 mg, oral, Nightly    blood sugar diagnostic (OneTouch Verio test strips) strip Test blood sugars 4 times a day as directed    brivaracetam (BRIVIACT) 200 mg, oral, 2 times daily    buPROPion SR (Wellbutrin SR) 150 mg 12 hr tablet     cholecalciferol (Vitamin D-3) 125 MCG (5000 UT) capsule 1 capsule, oral, Daily, Once weekly on friday    clotrimazole (Lotrimin) 1 % cream 1 Application, Topical, 2 times daily    diclofenac sodium (Voltaren) 1 % gel gel APPLY 4 INCHES OF MEDICATION TO EACH KNEE EVERY 4 HOURS AS NEEDED    dulaglutide (TRULICITY) 0.75 mg, subcutaneous, Once Weekly    ergocalciferol (VITAMIN D-2) 1,250 mcg, oral, Once Weekly    Fycompa 4 mg, oral, Nightly    gabapentin (NEURONTIN) 1,200 mg, oral, 3 times daily    ibuprofen 800 mg, oral, Every 8 hours PRN    lamoTRIgine  (LAMICTAL) 200 mg, oral, Daily    lisinopril 5 mg, oral, Daily    metoprolol tartrate (LOPRESSOR) 25 mg, oral, 2 times daily    montelukast (SINGULAIR) 10 mg, oral, Nightly    ondansetron (ZOFRAN) 8 mg, oral, Every 8 hours PRN    pantoprazole (ProtoNix) 40 mg EC tablet 1 tablet, oral, 2 times daily    topiramate (TOPAMAX) 50 mg, oral, 2 times daily    umeclidinium-vilanteroL (Anoro Ellipta) 62.5-25 mcg/actuation blister with device 1 puff, inhalation, Daily    valACYclovir (VALTREX) 500 mg, oral, Daily        Allergies   Allergen Reactions    Duloxetine Unknown    Pregabalin Unknown    Shellfish Derived Unknown        Physical Exam:  Right ear-external canal is patent.  Her T-tube is functioning normally.  No drainage or signs of infection.  Mastoid nontender.  Left ear-external canal is patent.  TM intact.  No effusion.  Mastoid nontender.  Nose-clear no rhinorrhea  Oral cavity-clear, no lesions, no postnasal drip.    Results:  []    Procedure:  []    Marcin Montenegro, DO

## 2024-06-28 PROBLEM — E24.0 PITUITARY CUSHING'S SYNDROME (MULTI): Status: ACTIVE | Noted: 2024-06-28

## 2024-06-28 PROBLEM — E23.6 CYST OF PITUITARY GLAND (MULTI): Status: ACTIVE | Noted: 2024-06-28

## 2024-06-28 PROBLEM — I10 BENIGN ESSENTIAL HYPERTENSION: Status: ACTIVE | Noted: 2024-06-28

## 2024-06-28 PROBLEM — K72.90 LIVER FAILURE (MULTI): Status: ACTIVE | Noted: 2024-06-28

## 2024-06-28 PROBLEM — J44.89 ASTHMA-CHRONIC OBSTRUCTIVE PULMONARY DISEASE OVERLAP SYNDROME (MULTI): Status: ACTIVE | Noted: 2024-06-28

## 2024-07-02 ENCOUNTER — APPOINTMENT (OUTPATIENT)
Dept: OTOLARYNGOLOGY | Facility: CLINIC | Age: 49
End: 2024-07-02
Payer: COMMERCIAL

## 2024-07-02 ENCOUNTER — CLINICAL SUPPORT (OUTPATIENT)
Dept: CARDIOLOGY | Facility: CLINIC | Age: 49
End: 2024-07-02
Payer: COMMERCIAL

## 2024-07-02 DIAGNOSIS — I49.5 SINUS NODE DYSFUNCTION (MULTI): ICD-10-CM

## 2024-07-02 DIAGNOSIS — R55 SYNCOPE AND COLLAPSE: ICD-10-CM

## 2024-07-02 DIAGNOSIS — I49.5 SICK SINUS SYNDROME (MULTI): ICD-10-CM

## 2024-07-02 DIAGNOSIS — Z95.0 PACEMAKER: ICD-10-CM

## 2024-07-02 NOTE — PROGRESS NOTES
Pt. here for wound check of loop explant and pacemaker implant by Dr. Monson on 6/25/24 at [facility]. L clavicular area is well approximated with no erythema or drainage. Pt. denies any fever or chills. Temp 98.4

## 2024-07-18 ENCOUNTER — APPOINTMENT (OUTPATIENT)
Dept: CARDIOLOGY | Facility: HOSPITAL | Age: 49
End: 2024-07-18
Payer: COMMERCIAL

## 2024-08-07 ENCOUNTER — HOSPITAL ENCOUNTER (OUTPATIENT)
Dept: CARDIOLOGY | Facility: HOSPITAL | Age: 49
Discharge: HOME | End: 2024-08-07
Payer: COMMERCIAL

## 2024-08-07 DIAGNOSIS — R00.1 BRADYCARDIA: ICD-10-CM

## 2024-08-07 DIAGNOSIS — Z95.0 CARDIAC PACEMAKER IN SITU: ICD-10-CM

## 2024-08-12 ENCOUNTER — APPOINTMENT (OUTPATIENT)
Dept: ENDOCRINOLOGY | Facility: HOSPITAL | Age: 49
End: 2024-08-12
Payer: COMMERCIAL

## 2024-08-22 ENCOUNTER — LAB (OUTPATIENT)
Dept: LAB | Facility: LAB | Age: 49
End: 2024-08-22
Payer: COMMERCIAL

## 2024-08-22 ENCOUNTER — APPOINTMENT (OUTPATIENT)
Dept: ENDOCRINOLOGY | Facility: CLINIC | Age: 49
End: 2024-08-22
Payer: COMMERCIAL

## 2024-08-22 VITALS
DIASTOLIC BLOOD PRESSURE: 80 MMHG | HEART RATE: 85 BPM | SYSTOLIC BLOOD PRESSURE: 119 MMHG | WEIGHT: 197.4 LBS | BODY MASS INDEX: 32.85 KG/M2 | TEMPERATURE: 97 F

## 2024-08-22 DIAGNOSIS — E55.9 VITAMIN D DEFICIENCY: Primary | ICD-10-CM

## 2024-08-22 DIAGNOSIS — I49.5 SINUS NODE DYSFUNCTION (MULTI): ICD-10-CM

## 2024-08-22 DIAGNOSIS — R00.2 PALPITATIONS: ICD-10-CM

## 2024-08-22 DIAGNOSIS — E78.00 HYPERCHOLESTEROLEMIA: ICD-10-CM

## 2024-08-22 DIAGNOSIS — E11.9 DIABETES MELLITUS TYPE II, NON INSULIN DEPENDENT (MULTI): ICD-10-CM

## 2024-08-22 DIAGNOSIS — R00.1 BRADYCARDIA: ICD-10-CM

## 2024-08-22 DIAGNOSIS — E55.9 VITAMIN D DEFICIENCY: ICD-10-CM

## 2024-08-22 DIAGNOSIS — R61 NIGHT SWEATS: ICD-10-CM

## 2024-08-22 DIAGNOSIS — R42 DIZZINESS: ICD-10-CM

## 2024-08-22 DIAGNOSIS — E04.2 MULTINODULAR GOITER (NONTOXIC): ICD-10-CM

## 2024-08-22 DIAGNOSIS — E66.01 MORBID OBESITY (MULTI): ICD-10-CM

## 2024-08-22 DIAGNOSIS — R00.0 WIDE-COMPLEX TACHYCARDIA: ICD-10-CM

## 2024-08-22 DIAGNOSIS — R07.9 CHEST PAIN, UNSPECIFIED TYPE: ICD-10-CM

## 2024-08-22 DIAGNOSIS — I49.5 SICK SINUS SYNDROME (MULTI): ICD-10-CM

## 2024-08-22 DIAGNOSIS — R55 SYNCOPE AND COLLAPSE: ICD-10-CM

## 2024-08-22 DIAGNOSIS — R06.02 SHORTNESS OF BREATH: ICD-10-CM

## 2024-08-22 DIAGNOSIS — Z95.0 PACEMAKER: ICD-10-CM

## 2024-08-22 LAB
25(OH)D3 SERPL-MCNC: 29 NG/ML (ref 30–100)
ALBUMIN SERPL BCP-MCNC: 4.7 G/DL (ref 3.4–5)
ANION GAP SERPL CALC-SCNC: 17 MMOL/L (ref 10–20)
APTT PPP: 22 SECONDS (ref 27–38)
BUN SERPL-MCNC: 21 MG/DL (ref 6–23)
CALCIUM SERPL-MCNC: 9.5 MG/DL (ref 8.6–10.6)
CHLORIDE SERPL-SCNC: 108 MMOL/L (ref 98–107)
CHOLEST SERPL-MCNC: 183 MG/DL (ref 0–199)
CHOLESTEROL/HDL RATIO: 5.7
CO2 SERPL-SCNC: 19 MMOL/L (ref 21–32)
CORTIS SERPL-MCNC: 8.8 UG/DL (ref 2.5–20)
CREAT SERPL-MCNC: 0.75 MG/DL (ref 0.5–1.05)
CREAT UR-MCNC: 151.3 MG/DL (ref 20–320)
DHEA-S SERPL-MCNC: 91 UG/DL (ref 12–379)
EGFRCR SERPLBLD CKD-EPI 2021: >90 ML/MIN/1.73M*2
ERYTHROCYTE [DISTWIDTH] IN BLOOD BY AUTOMATED COUNT: 13.3 % (ref 11.5–14.5)
EST. AVERAGE GLUCOSE BLD GHB EST-MCNC: 120 MG/DL
ESTRADIOL SERPL-MCNC: 33 PG/ML
FSH SERPL-ACNC: 29.7 IU/L
GLUCOSE SERPL-MCNC: 97 MG/DL (ref 74–99)
HBA1C MFR BLD: 5.8 %
HCT VFR BLD AUTO: 60.6 % (ref 36–46)
HDLC SERPL-MCNC: 32 MG/DL
HGB BLD-MCNC: 19.3 G/DL (ref 12–16)
INR PPP: 0.9 (ref 0.9–1.1)
LDLC SERPL CALC-MCNC: 93 MG/DL
LH SERPL-ACNC: 32.9 IU/L
MCH RBC QN AUTO: 33.4 PG (ref 26–34)
MCHC RBC AUTO-ENTMCNC: 31.8 G/DL (ref 32–36)
MCV RBC AUTO: 105 FL (ref 80–100)
MICROALBUMIN UR-MCNC: 19.4 MG/L
MICROALBUMIN/CREAT UR: 12.8 UG/MG CREAT
NON HDL CHOLESTEROL: 151 MG/DL (ref 0–149)
NRBC BLD-RTO: 0 /100 WBCS (ref 0–0)
PHOSPHATE SERPL-MCNC: 3.5 MG/DL (ref 2.5–4.9)
PLATELET # BLD AUTO: 64 X10*3/UL (ref 150–450)
POTASSIUM SERPL-SCNC: 4.9 MMOL/L (ref 3.5–5.3)
PROLACTIN SERPL-MCNC: 5.1 UG/L (ref 3–20)
PROTHROMBIN TIME: 10.2 SECONDS (ref 9.8–12.8)
RBC # BLD AUTO: 5.78 X10*6/UL (ref 4–5.2)
SODIUM SERPL-SCNC: 139 MMOL/L (ref 136–145)
TRIGL SERPL-MCNC: 288 MG/DL (ref 0–149)
TSH SERPL-ACNC: 0.94 MIU/L (ref 0.44–3.98)
VLDL: 58 MG/DL (ref 0–40)
WBC # BLD AUTO: 10 X10*3/UL (ref 4.4–11.3)

## 2024-08-22 PROCEDURE — 3074F SYST BP LT 130 MM HG: CPT | Performed by: STUDENT IN AN ORGANIZED HEALTH CARE EDUCATION/TRAINING PROGRAM

## 2024-08-22 PROCEDURE — 80061 LIPID PANEL: CPT

## 2024-08-22 PROCEDURE — 85027 COMPLETE CBC AUTOMATED: CPT

## 2024-08-22 PROCEDURE — 82570 ASSAY OF URINE CREATININE: CPT

## 2024-08-22 PROCEDURE — 3061F NEG MICROALBUMINURIA REV: CPT | Performed by: STUDENT IN AN ORGANIZED HEALTH CARE EDUCATION/TRAINING PROGRAM

## 2024-08-22 PROCEDURE — 83036 HEMOGLOBIN GLYCOSYLATED A1C: CPT

## 2024-08-22 PROCEDURE — 82306 VITAMIN D 25 HYDROXY: CPT

## 2024-08-22 PROCEDURE — 80069 RENAL FUNCTION PANEL: CPT

## 2024-08-22 PROCEDURE — 84402 ASSAY OF FREE TESTOSTERONE: CPT

## 2024-08-22 PROCEDURE — 4010F ACE/ARB THERAPY RXD/TAKEN: CPT | Performed by: STUDENT IN AN ORGANIZED HEALTH CARE EDUCATION/TRAINING PROGRAM

## 2024-08-22 PROCEDURE — 3044F HG A1C LEVEL LT 7.0%: CPT | Performed by: STUDENT IN AN ORGANIZED HEALTH CARE EDUCATION/TRAINING PROGRAM

## 2024-08-22 PROCEDURE — 82670 ASSAY OF TOTAL ESTRADIOL: CPT

## 2024-08-22 PROCEDURE — 82627 DEHYDROEPIANDROSTERONE: CPT

## 2024-08-22 PROCEDURE — 85730 THROMBOPLASTIN TIME PARTIAL: CPT

## 2024-08-22 PROCEDURE — 83001 ASSAY OF GONADOTROPIN (FSH): CPT

## 2024-08-22 PROCEDURE — 82024 ASSAY OF ACTH: CPT

## 2024-08-22 PROCEDURE — 84146 ASSAY OF PROLACTIN: CPT

## 2024-08-22 PROCEDURE — 36415 COLL VENOUS BLD VENIPUNCTURE: CPT

## 2024-08-22 PROCEDURE — 83002 ASSAY OF GONADOTROPIN (LH): CPT

## 2024-08-22 PROCEDURE — 85610 PROTHROMBIN TIME: CPT

## 2024-08-22 PROCEDURE — 99215 OFFICE O/P EST HI 40 MIN: CPT | Performed by: STUDENT IN AN ORGANIZED HEALTH CARE EDUCATION/TRAINING PROGRAM

## 2024-08-22 PROCEDURE — 3048F LDL-C <100 MG/DL: CPT | Performed by: STUDENT IN AN ORGANIZED HEALTH CARE EDUCATION/TRAINING PROGRAM

## 2024-08-22 PROCEDURE — 82533 TOTAL CORTISOL: CPT

## 2024-08-22 PROCEDURE — 84443 ASSAY THYROID STIM HORMONE: CPT

## 2024-08-22 PROCEDURE — 3079F DIAST BP 80-89 MM HG: CPT | Performed by: STUDENT IN AN ORGANIZED HEALTH CARE EDUCATION/TRAINING PROGRAM

## 2024-08-22 PROCEDURE — 82043 UR ALBUMIN QUANTITATIVE: CPT

## 2024-08-22 RX ORDER — BLOOD-GLUCOSE CONTROL, NORMAL
EACH MISCELLANEOUS
Qty: 200 EACH | Refills: 3 | Status: SHIPPED | OUTPATIENT
Start: 2024-08-22

## 2024-08-22 ASSESSMENT — PAIN SCALES - GENERAL: PAINLEVEL: 5

## 2024-08-22 NOTE — PATIENT INSTRUCTIONS
Continue Trulicity  Continue to check BG Bring glucometer next time  Follow with ophthalmology  Continue Gabapentin    Blood work today  We will decide on Vitamin D dose accordingly    RTC in 6 months

## 2024-08-22 NOTE — PROGRESS NOTES
Patient coming in for follow up for T2DM    Subjective   Ruth Marin is a 48 y.o. female who presents for follow up for Type 2 diabetes mellitus.   Lab Results   Component Value Date    HGBA1C 5.6 10/31/2023      Ruth Marin is a 48 y.o. female who presents for follow-up of Type 2 diabetes mellitus. The initial diagnosis of diabetes was made  over 10 years ago . The patient does have a known family history of diabetes.  Current meds:  Trulicity 0.75 mg once weekly  Was 420lbs now 197lbs.   Checked BG random and its always in the normal range. Checks it 5 times a week 120s  Per patient sometimes 70s.  Per patient was diagnosed with cushings and was on korlym? Per records saw Dr. Singh and per notes 24 hour urine showed cortisol of 19.  Stopped Korlym Dec 2020  Chronic pancreatitis?  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 The patient is on an ACE inhibitor or angiotensin II receptor blocker.  The patient has not been previously hospitalized due to diabetic ketoacidosis.     The patient is currently checking the blood glucose 2+ times per day.     Patient is using: glucometer     Hypoglycemia frequency: n/a  Hypoglycemia awareness: Yes      Exercise: intermittently   Meal panning: She is using avoidance of concentrated sweets.     Getting set up for L knee surgery and aortic stent placement. Hopes to find a new plastic surgeon who accepts her insurance for pannus skin reduction.  Foot Exam: Not following with podiatry. On Gabapentin  Eye Exam: was  seen last oct 2023  Lipid Panel: atorvastatin 80 mg and fenofibrate LDL: 96 and T  Urine Albumin: UACR in Oct 2023 18.3. Was previously on lisinopril   Per patient she can grow a beard  She shaves her face daily  Still smoking now cigars  Last GC injection in her left knee 5-6 months ago    Review of Systems  all pertinent systems reviewed and are otherwise negative   Objective   Visit Vitals  /80 (BP Location: Left arm, Patient Position: Sitting, BP Cuff Size: Adult)   Pulse 85   Temp 36.1 °C (97 °F)   Wt 89.5 kg (197 lb 6.4 oz)   LMP  (LMP Unknown)   BMI 32.85 kg/m²   OB Status Ablation   Smoking Status Every Day   BSA 2.03 m²      Physical Exam  Constitutional:       General: She is not in acute distress.     Appearance: Normal appearance. She is obese.   Eyes:      Extraocular Movements: Extraocular movements intact.      Pupils: Pupils are equal, round, and reactive to light.   Neck:      Comments: Mild buffalo hump  Cardiovascular:      Rate and Rhythm: Normal rate and regular rhythm.   Pulmonary:      Effort: Pulmonary effort is normal. No respiratory distress.      Breath sounds: Normal breath sounds.   Abdominal:      General: Bowel sounds are normal.      Palpations: Abdomen is soft.      Tenderness: There is no abdominal tenderness.   Skin:     Coloration: Skin is not jaundiced or pale.      Findings: No erythema or rash.   Neurological:      General: No focal deficit present.      Mental Status: She is alert and oriented to person, place, and time.      Deep Tendon Reflexes: Reflexes normal.   Psychiatric:         Mood and Affect: Mood normal.         Behavior: Behavior normal.         Lab Review  Glucose (mg/dL)   Date Value   2024 142 (H)   2024 126 (H)   10/31/2023 111 (H)     Hemoglobin A1C (%)   Date Value   10/31/2023 5.6   2023 5.9 (A)   2022 8.5 (A)   06/15/2021 7.5     Bicarbonate (mmol/L)   Date Value   2024 20 (L)   2024 21   10/31/2023 18 (L)  "    Urea Nitrogen (mg/dL)   Date Value   06/25/2024 13   02/02/2024 15   10/31/2023 15     Creatinine (mg/dL)   Date Value   06/25/2024 0.64   02/02/2024 0.79   10/31/2023 0.70     Lab Results   Component Value Date    CHOL 161 10/31/2023    CHOL 292 (H) 09/05/2019     Lab Results   Component Value Date    HDL 33.1 10/31/2023    HDL 18.7 (A) 09/05/2019     Lab Results   Component Value Date    LDLCALC 96 10/31/2023     Lab Results   Component Value Date    TRIG 161 (H) 10/31/2023    TRIG 489 (H) 09/05/2019     No components found for: \"CHOLHDL\"   Lab Results   Component Value Date    TSH 0.84 10/31/2023     No results found for: \"ALBUR\", \"[id number]\"     Health Maintenance:       Assessment/Plan   Ruth Marin is a 48 y.o. female who presents for follow up for Type 2 diabetes mellitus.   Lab Results   Component Value Date    HGBA1C 5.6 10/31/2023      Ruth Marin is a 48 y.o. female who presents for follow-up of Type 2 diabetes mellitus. The initial diagnosis of diabetes was made  over 10 years ago . The patient does have a known family history of diabetes.  Current meds:  Trulicity 0.75 mg once weekly  Was 420lbs now 197lbs.   Checked BG random and its always in the normal range. Checks it 5 times a week 120s  Per patient sometimes 70s.  Per patient was diagnosed with cushings and was on korlym? Per records saw Dr. Singh and per notes 24 hour urine showed cortisol of 19.  Stopped Korlym Dec 2020  Chronic pancreatitis?  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 Hopes to find a new plastic surgeon who accepts her insurance for pannus skin reduction.  Foot Exam: Not following with podiatry. On Gabapentin  Eye Exam: was seen last oct 2023  Lipid Panel: " atorvastatin 80 mg and fenofibrate LDL: 96 and T  Urine Albumin: UACR in Oct 2023 18.3. Was previously on lisinopril   Per patient she can grow a beard  She shaves her face daily  Still smoking now cigars  Last GC injection in her left knee 5-6 months ago  Plan:  Continue Trulicity  Continue to check BG Bring glucometer next time  Follow with ophthalmology  Continue Gabapentin    Blood work today  We will decide on Vitamin D dose accordingly    RTC in 6 months  Assessment & Plan  Diabetes mellitus type II, non insulin dependent (Multi)    Orders:    Referral to Endocrinology    Testosterone,Free and Total; Future    Adrenocorticotropic Hormone (ACTH); Future    Cortisol; Future    DHEA-sulfate; Future    TSH with reflex to Free T4 if abnormal; Future    Prolactin; Future    Renal Function Panel; Future    Hemoglobin A1C; Future    Lipid Panel; Future    Albumin-Creatinine Ratio, Urine Random; Future    Estradiol; Future    FSH & LH; Future    lancets (OneTouch Delica Plus Lancet) 30 gauge misc; Check BG    Multinodular goiter (nontoxic)    Orders:    Referral to Endocrinology    TSH with reflex to Free T4 if abnormal; Future    Morbid obesity (Multi)    Orders:    Referral to Endocrinology    Testosterone,Free and Total; Future    Adrenocorticotropic Hormone (ACTH); Future    Cortisol; Future    DHEA-sulfate; Future    Estradiol; Future    FSH & LH; Future    Vitamin D deficiency    Orders:    Vitamin D 25-Hydroxy,Total (for eval of Vitamin D levels); Future

## 2024-08-24 LAB — ACTH PLAS-MCNC: 8.7 PG/ML (ref 7.2–63.3)

## 2024-08-27 ENCOUNTER — TELEPHONE (OUTPATIENT)
Dept: CARDIOLOGY | Facility: CLINIC | Age: 49
End: 2024-08-27
Payer: COMMERCIAL

## 2024-08-27 DIAGNOSIS — E78.5 HYPERLIPIDEMIA LDL GOAL <100: ICD-10-CM

## 2024-08-27 NOTE — TELEPHONE ENCOUNTER
Call placed to patient and left voice mial message requesting return call to discuss results and recommendations.  Once discussed new Rx for Atorvastatin 80 mg will need sent and lipids ordered for 2 months.

## 2024-08-27 NOTE — TELEPHONE ENCOUNTER
Patient called back to obtain lab results. Patient informed and verbalized understanding. Patient stated she has already been on 80 mg for 4 months which was increased by another doctor. Routed to Riya Olivas LPN

## 2024-08-27 NOTE — TELEPHONE ENCOUNTER
----- Message from Alfredito Hudson sent at 8/23/2024  7:46 AM EDT -----  LDL cholesterol high at 93, increase atorvastatin to 80 mg a day as long as patient has not had any problems with higher dose statin, recheck lipids in 2 months if dose going to be increased  ----- Message -----  From: Lab, Background User  Sent: 8/22/2024   7:30 PM EDT  To: Alfredito Hudson MD

## 2024-08-27 NOTE — TELEPHONE ENCOUNTER
Pt called and left message regarding test results, I did call back but no answer so I left a call back number

## 2024-08-28 ENCOUNTER — HOSPITAL ENCOUNTER (OUTPATIENT)
Dept: HOSPITAL 101 - PT | Age: 49
LOS: 1 days | Discharge: HOME | End: 2024-08-29
Payer: COMMERCIAL

## 2024-08-28 DIAGNOSIS — M54.50: Primary | ICD-10-CM

## 2024-08-28 DIAGNOSIS — G89.29: ICD-10-CM

## 2024-08-28 PROCEDURE — 97163 PT EVAL HIGH COMPLEX 45 MIN: CPT

## 2024-08-28 RX ORDER — EZETIMIBE 10 MG/1
10 TABLET ORAL DAILY
Qty: 90 TABLET | Refills: 3 | Status: SHIPPED | OUTPATIENT
Start: 2024-08-28 | End: 2025-08-28

## 2024-08-28 NOTE — TELEPHONE ENCOUNTER
Patient informed per Dr. Alfredito Hudson MD to add Zetia 10 mg and repeat lipids in 2 months. Patient verbalized understanding. Patient would like a copy of lab orders mailed to her as well. Routed to provider for signing and Riya Olivas LPN for mailing copy of orders.

## 2024-08-28 NOTE — TELEPHONE ENCOUNTER
Per Dr. Alfredito Hudson , add Ezetimibe 10 mg once a day.  Repeat lipids in 2 months as previously instructed.    Call returned to patient and left voice mail message requesting return call.  Will need to send in the Ezetimibe once confirmed with patient.

## 2024-08-30 ENCOUNTER — TELEPHONE (OUTPATIENT)
Dept: ENDOCRINOLOGY | Facility: CLINIC | Age: 49
End: 2024-08-30
Payer: COMMERCIAL

## 2024-08-30 LAB
TESTOSTERONE FREE (CHAN): 4.3 PG/ML (ref 0.1–6.4)
TESTOSTERONE,TOTAL,LC-MS/MS: 71 NG/DL (ref 2–45)

## 2024-08-30 NOTE — ASSESSMENT & PLAN NOTE
Orders:    Referral to Endocrinology    Testosterone,Free and Total; Future    Adrenocorticotropic Hormone (ACTH); Future    Cortisol; Future    DHEA-sulfate; Future    Estradiol; Future    FSH & LH; Future

## 2024-08-30 NOTE — TELEPHONE ENCOUNTER
Left voicemail to give attached message from provider. Office number was given for a call back in case of any questions or concerns.    Ebony Brambila RN   ----- Message from Betty Ivy sent at 8/29/2024 11:17 PM EDT -----  Please inform patient that we reviewed her blood work and it looks good except for elevated tryglicerides. We suggest low fat diet.  Vitamin D slightly low start vitamin D 2000IU daily

## 2024-08-30 NOTE — ASSESSMENT & PLAN NOTE
Orders:    Referral to Endocrinology    Testosterone,Free and Total; Future    Adrenocorticotropic Hormone (ACTH); Future    Cortisol; Future    DHEA-sulfate; Future    TSH with reflex to Free T4 if abnormal; Future    Prolactin; Future    Renal Function Panel; Future    Hemoglobin A1C; Future    Lipid Panel; Future    Albumin-Creatinine Ratio, Urine Random; Future    Estradiol; Future    FSH & LH; Future    lancets (OneTouch Delica Plus Lancet) 30 gauge misc; Check BG

## 2024-09-04 DIAGNOSIS — E55.9 VITAMIN D DEFICIENCY: ICD-10-CM

## 2024-09-04 NOTE — TELEPHONE ENCOUNTER
Spoke with patient to give attached message from provider. No questions or concerns raised at the time. Patient verbalized understanding of results.       ----- Message from Betty Ivy sent at 9/3/2024  9:47 AM EDT -----  She does not need the weekly she should be fine with lower dose.    Thank you,  Betty  ----- Message -----  From: Ebony Brambila RN  Sent: 8/30/2024  10:21 AM EDT  To: Betty Ivy MD    Hi was previously on a weekly dosage for vit d can we restart that or you would like her to do daily?  ----- Message -----  From: Betty Ivy MD  Sent: 8/29/2024  11:17 PM EDT  To: [email or web address] Clinical Support Staff    Please inform patient that we reviewed her blood work and it looks good except for elevated tryglicerides. We suggest low fat diet.  Vitamin D slightly low start vitamin D 2000IU daily

## 2024-09-05 RX ORDER — ERGOCALCIFEROL (VITAMIN D2) 50 MCG
2000 CAPSULE ORAL DAILY
Qty: 90 CAPSULE | Refills: 3 | Status: SHIPPED | OUTPATIENT
Start: 2024-09-05 | End: 2025-09-05

## 2024-10-02 ENCOUNTER — HOSPITAL ENCOUNTER (OUTPATIENT)
Dept: RADIOLOGY | Facility: HOSPITAL | Age: 49
Discharge: HOME | End: 2024-10-02
Payer: COMMERCIAL

## 2024-10-02 ENCOUNTER — APPOINTMENT (OUTPATIENT)
Dept: CARDIOLOGY | Facility: CLINIC | Age: 49
End: 2024-10-02
Payer: COMMERCIAL

## 2024-10-02 ENCOUNTER — HOSPITAL ENCOUNTER (OUTPATIENT)
Dept: CARDIOLOGY | Facility: HOSPITAL | Age: 49
Discharge: HOME | End: 2024-10-02
Payer: COMMERCIAL

## 2024-10-02 VITALS
WEIGHT: 207 LBS | SYSTOLIC BLOOD PRESSURE: 116 MMHG | HEIGHT: 65 IN | DIASTOLIC BLOOD PRESSURE: 72 MMHG | HEART RATE: 62 BPM | BODY MASS INDEX: 34.49 KG/M2

## 2024-10-02 DIAGNOSIS — I49.5 SINUS NODE DYSFUNCTION (MULTI): ICD-10-CM

## 2024-10-02 DIAGNOSIS — I49.5 SICK SINUS SYNDROME (MULTI): ICD-10-CM

## 2024-10-02 DIAGNOSIS — Z95.0 PACEMAKER: ICD-10-CM

## 2024-10-02 DIAGNOSIS — I47.29 NSVT (NONSUSTAINED VENTRICULAR TACHYCARDIA) (MULTI): ICD-10-CM

## 2024-10-02 DIAGNOSIS — F17.200 CURRENT EVERY DAY SMOKER: ICD-10-CM

## 2024-10-02 DIAGNOSIS — Z95.0 CARDIAC PACEMAKER IN SITU: Primary | ICD-10-CM

## 2024-10-02 PROCEDURE — 4004F PT TOBACCO SCREEN RCVD TLK: CPT | Performed by: INTERNAL MEDICINE

## 2024-10-02 PROCEDURE — 3048F LDL-C <100 MG/DL: CPT | Performed by: INTERNAL MEDICINE

## 2024-10-02 PROCEDURE — 93000 ELECTROCARDIOGRAM COMPLETE: CPT | Mod: DISTINCT PROCEDURAL SERVICE | Performed by: INTERNAL MEDICINE

## 2024-10-02 PROCEDURE — 99214 OFFICE O/P EST MOD 30 MIN: CPT | Performed by: INTERNAL MEDICINE

## 2024-10-02 PROCEDURE — 93280 PM DEVICE PROGR EVAL DUAL: CPT

## 2024-10-02 PROCEDURE — 93280 PM DEVICE PROGR EVAL DUAL: CPT | Performed by: INTERNAL MEDICINE

## 2024-10-02 PROCEDURE — 71046 X-RAY EXAM CHEST 2 VIEWS: CPT

## 2024-10-02 PROCEDURE — 3008F BODY MASS INDEX DOCD: CPT | Performed by: INTERNAL MEDICINE

## 2024-10-02 PROCEDURE — 3044F HG A1C LEVEL LT 7.0%: CPT | Performed by: INTERNAL MEDICINE

## 2024-10-02 PROCEDURE — 71046 X-RAY EXAM CHEST 2 VIEWS: CPT | Performed by: STUDENT IN AN ORGANIZED HEALTH CARE EDUCATION/TRAINING PROGRAM

## 2024-10-02 PROCEDURE — 3078F DIAST BP <80 MM HG: CPT | Performed by: INTERNAL MEDICINE

## 2024-10-02 PROCEDURE — 3074F SYST BP LT 130 MM HG: CPT | Performed by: INTERNAL MEDICINE

## 2024-10-02 PROCEDURE — 3061F NEG MICROALBUMINURIA REV: CPT | Performed by: INTERNAL MEDICINE

## 2024-10-02 RX ORDER — FENOFIBRATE 145 MG/1
145 TABLET, FILM COATED ORAL DAILY
COMMUNITY

## 2024-10-02 RX ORDER — SUCRALFATE 1 G/1
1 TABLET ORAL
COMMUNITY

## 2024-10-02 RX ORDER — LUBIPROSTONE 24 UG/1
24 CAPSULE ORAL
COMMUNITY

## 2024-10-02 ASSESSMENT — ENCOUNTER SYMPTOMS
PALPITATIONS: 0
DYSPNEA ON EXERTION: 0

## 2024-10-02 NOTE — PROGRESS NOTES
CARDIOLOGY OFFICE VISIT      CHIEF COMPLAINT  Chief Complaint   Patient presents with    Follow-up     Pt is here today following up after 6 months with device check        HISTORY OF PRESENT ILLNESS  HPI  48-year-old female with a past medical history of cystic compulsive disorder and also history of Chiari malformation with brain surgery done approximately a year ago. Patient has been followed by gynecology service since August last year after patient was admitted to outside hospital. 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 Then she got some radiation into the right arm. Then she started noticing right arm and right lower extremity weakness and she went home with those symptoms. At home her blood pressure systolic was found to be in the 80s with heart rates in the 40s. She came to emergency department for an evaluation. During telemetry patient states that her heart rate was always bradycardic. She was referred for cardiology for an evaluation. She had an a stress test and echocardiogram and also a Holter monitor that were unremarkable.     Echocardiogram in February 2023 shows left ventricular ejection fraction of 60 to 65% with no significant valvular normalities. Holter monitor in December 2022 shows underlying rhythm of sinus rhythm with minimum heart rate 45 bpm maximal heart rate of 129 beats per hours heart of 64 bpm. There were brief episodes of nonsustained supraventricular tachycardia up to 4 beats of duration. Asymptomatic. No evidence of heart block or atrial fibrillation. Had a stress test in February 2023 shows no evidence of ischemia with a left ventricular ejection fraction of 69%.     Due to persisting of symptoms of palpitations chest discomfort and sometimes diaphoresis an event monitor was ordered in May 2023. Event monitor shows underlying rhythm was sinus rhythm. There were 23 triggered events with  symptoms that they were not specified. 1 of these episodes were related with wide-complex tachycardia total of 9 beats that occurred on April 17, 2023 at 1 in the morning. rate of 173 bpm.     Based on the results of the event monitor a cardiac MRI was done that shows left ventricular ejection fraction of 62% with delayed enhancement normal. No significant valvular normalities with mild asymmetric left ventricular hypertrophy.        Patient with implantation of a loop recorder in February 2, 2024 with no complications. Loop recorder interrogation has been showing episodes of sinus bradycardia with pauses up to 5 seconds of duration multiple times for the last 2 months. These episodes happen between afternoon-early evening.     Patient underwent implantation of a dual-chamber pacemaker in June 2024 with no complications.  Loop recorder was removed.    Since then patient states that she feels much better.  She denies any symptoms.    EKG performed today shows atrial paced rhythm at rate of 62 bpm QRS duration 88 ms QT corrected 460 ms.  Rhythm strip shows the same pattern.    Patient had a device interrogation today at the device clinic and it shows a dual-chamber pacemaker [employer] with battery longevity 14 years.  No evidence of atrial fibrillation and 1 brief episode of nonsustained ventricular tachycardia.      Past Medical History  Past Medical History:   Diagnosis Date    Arrhythmia     Asthma     Current smoker     Diabetes mellitus (Multi)     Disease of thyroid gland     History of Chiari malformation     Hypercortisolism (Multi) 09/21/2023    Hyperlipidemia     Hypertension     OCD (obsessive compulsive disorder)     Other disorders of lung     Lung trouble    Personal history of other diseases of the digestive system     History of gastroesophageal reflux (GERD)    Personal history of other diseases of the musculoskeletal system and connective tissue     History of arthritis    Personal history of other  diseases of the nervous system and sense organs     History of sleep apnea    Personal history of other diseases of the respiratory system     History of chronic obstructive lung disease    Personal history of other diseases of the respiratory system     History of bronchitis    Personal history of other diseases of the respiratory system     History of asthma    Personal history of other endocrine, nutritional and metabolic disease     History of diabetes mellitus    Personal history of other endocrine, nutritional and metabolic disease     History of thyroid disorder    Personal history of other mental and behavioral disorders     History of depression    Personal history of other mental and behavioral disorders     History of mental disorder    Personal history of other specified conditions     History of snoring       Social History  Social History     Tobacco Use    Smoking status: Every Day     Current packs/day: 0.50     Types: Cigarettes    Smokeless tobacco: Never   Vaping Use    Vaping status: Never Used   Substance Use Topics    Alcohol use: Not Currently     Comment: quit 2008    Drug use: Yes     Types: Marijuana     Comment: 3 times per week       Family History     Family History   Problem Relation Name Age of Onset    Hypertension Mother      Arthritis Mother      No Known Problems Father          Allergies:  Allergies   Allergen Reactions    Fish Containing Products Anaphylaxis    Iodinated Contrast Media Anaphylaxis    Duloxetine Unknown    Pregabalin Unknown        Outpatient Medications:  Current Outpatient Medications   Medication Instructions    albuterol (Ventolin HFA) 90 mcg/actuation inhaler 2 puffs, inhalation, Every 6 hours PRN    aspirin 81 mg, oral, Daily    atorvastatin (LIPITOR) 80 mg, oral, Nightly    blood sugar diagnostic (OneTouch Verio test strips) strip Test blood sugars 4 times a day as directed    brivaracetam (BRIVIACT) 200 mg, oral, 2 times daily    buPROPion SR (Wellbutrin  SR) 150 mg 12 hr tablet Take 1 tablet (150 mg) by mouth 3 times a day.    cholecalciferol (VITAMIN D-3) 2,000 Units, oral, Daily, Once weekly on friday    clotrimazole (Lotrimin) 1 % cream 1 Application, Topical, As needed    diclofenac sodium (Voltaren) 1 % gel gel APPLY 4 INCHES OF MEDICATION TO EACH KNEE EVERY 4 HOURS AS NEEDED    dulaglutide (TRULICITY) 0.75 mg, subcutaneous, Once Weekly    ergocalciferol (VITAMIN D-2) 50 mcg, oral, Daily    ezetimibe (ZETIA) 10 mg, oral, Daily    fenofibrate (TRICOR) 145 mg, oral, Daily    gabapentin (NEURONTIN) 1,200 mg, oral, 3 times daily    ibuprofen 800 mg, oral, Every 8 hours PRN    lamoTRIgine (LAMICTAL) 200 mg, oral, Daily    lancets (OneTouch Delica Plus Lancet) 30 gauge misc Check BG    lubiprostone (AMITIZA) 24 mcg, oral, 2 times daily (morning and late afternoon), As needed    metoprolol tartrate (LOPRESSOR) 25 mg, oral, 2 times daily    montelukast (SINGULAIR) 10 mg, oral, Nightly    ondansetron (ZOFRAN) 8 mg, oral, Every 8 hours PRN    pantoprazole (ProtoNix) 40 mg EC tablet 1 tablet, oral, 2 times daily    sucralfate (CARAFATE) 1 g, oral, 4 times daily before meals and nightly, As needed    topiramate (TOPAMAX) 50 mg, oral, 2 times daily    umeclidinium-vilanteroL (Anoro Ellipta) 62.5-25 mcg/actuation blister with device 1 puff, inhalation, Daily    valACYclovir (VALTREX) 500 mg, oral, Daily          REVIEW OF SYSTEMS  Review of Systems   Cardiovascular:  Negative for chest pain, dyspnea on exertion and palpitations.   All other systems reviewed and are negative.        VITALS  Vitals:    10/02/24 1016   BP: 116/72   Pulse: 62       PHYSICAL EXAM  Constitutional:       General: Awake.      Appearance: Normal and healthy appearance. Well-developed and not in distress. Obese.   Neck:      Vascular: No JVR. JVD normal.   Pulmonary:      Effort: Pulmonary effort is normal.      Breath sounds: Normal breath sounds. No wheezing. No rhonchi. No rales.   Chest:       Chest wall: Not tender to palpatation.      Comments: Left sided device pocket- healed and well approximated. No swelling or hematoma      Cardiovascular:      PMI at left midclavicular line. Normal rate. Regular rhythm. Normal S1. Normal S2.       Murmurs: There is no murmur.      No gallop.  No click. No rub.   Pulses:     Intact distal pulses.   Edema:     Peripheral edema absent.   Abdominal:      Tenderness: There is no abdominal tenderness.   Musculoskeletal: Normal range of motion.         General: No tenderness. Skin:     General: Skin is warm and dry.   Neurological:      General: No focal deficit present.      Mental Status: Alert and oriented to person, place and time.           ASSESSMENT AND PLAN  Clinical impression     1. Evidence of bradycardia during admission in outside hospital.  2. Palpitations  3. Evidence of nonsustained ventricular tachycardia on event monitor  4. Normal left ventricular function per echocardiogram in 2023 and cardiac MRI in 2023  5. No evidence of ischemia per stress test in 2023  6. 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 Obsessive-compulsive disorder  8.  Status post loop recorder implantation in February 2024 with no complications  9.  Evidence of pauses that may be related with near syncope.  This was discussed with patient and family members during this office visit.  Status post loop recorder removal and implantation of a dual-chamber pacemaker in June 12, 2024 with no complications    Plan recommendations    Patient is doing well from the electrophysiology standpoint.  Symptoms have improved significantly.  Continue with current medical therapy.    Continue with beta-blocker therapy.    Follow my office every 6 months or sooner if needed.    Follow device clinic as scheduled.    Risk factor modification and lifestyle modification discussed with patient. Diet , exercise and hydration discussed with patient.    I have personally review with patient  during this office visit, laboratory data, echocardiogram results, stress test results, Holter-event monitor results prior and after the last electrophysiology visit. All questions has been answered.    Please excuse any errors in grammar or translation related to this dictation.  Voice recognition software was utilized to prepare this document.

## 2024-10-02 NOTE — PATIENT INSTRUCTIONS
Continue same medications/treatment.  Patient educated on proper medication use.  Patient educated on risk factor modification.  Please bring any lab results from other providers/physicians to your next appointment.    Please bring all medicines, vitamins, and herbal supplements with you when you come to the office.    Prescriptions will not be filled unless you are compliant with your follow up appointments or have a follow up appointment scheduled as per instruction of your physician. Refills should be requested at the time of your visit.    Follow up with Dr. Jameson in 6 months with device check  Continue remote checks at 3 and 9 months    Lucía GALLEGO RN, AM SCRIBING FOR, AND IN THE PRESENCE OF DR. JEFF JAMESON MD

## 2024-10-07 ENCOUNTER — TELEPHONE (OUTPATIENT)
Dept: ENDOCRINOLOGY | Facility: CLINIC | Age: 49
End: 2024-10-07
Payer: COMMERCIAL

## 2024-10-07 NOTE — TELEPHONE ENCOUNTER
Spoke with patient to give attached message from provider. No questions or concerns raised at the time. Patient verbalized understanding of results.     Ebony Brambila RN   ----- Message from Betty Ivy sent at 10/6/2024  3:16 PM EDT -----  Please inform patient that her testosterone is elevated. We will discuss management during our next visit

## 2024-10-30 ENCOUNTER — TELEPHONE (OUTPATIENT)
Dept: NEUROLOGY | Facility: CLINIC | Age: 49
End: 2024-10-30

## 2024-10-30 ENCOUNTER — APPOINTMENT (OUTPATIENT)
Dept: NEUROLOGY | Facility: CLINIC | Age: 49
End: 2024-10-30
Payer: COMMERCIAL

## 2024-10-30 VITALS
DIASTOLIC BLOOD PRESSURE: 76 MMHG | SYSTOLIC BLOOD PRESSURE: 114 MMHG | BODY MASS INDEX: 34.92 KG/M2 | WEIGHT: 209.6 LBS | HEART RATE: 68 BPM | HEIGHT: 65 IN

## 2024-10-30 DIAGNOSIS — G62.9 NEUROPATHY: ICD-10-CM

## 2024-10-30 DIAGNOSIS — G43.009 MIGRAINE WITHOUT AURA AND WITHOUT STATUS MIGRAINOSUS, NOT INTRACTABLE: ICD-10-CM

## 2024-10-30 DIAGNOSIS — G40.219 PARTIAL SYMPTOMATIC EPILEPSY WITH COMPLEX PARTIAL SEIZURES, INTRACTABLE, WITHOUT STATUS EPILEPTICUS (MULTI): Primary | ICD-10-CM

## 2024-10-30 DIAGNOSIS — G40.219 PARTIAL SYMPTOMATIC EPILEPSY WITH COMPLEX PARTIAL SEIZURES, INTRACTABLE, WITHOUT STATUS EPILEPTICUS (MULTI): ICD-10-CM

## 2024-10-30 DIAGNOSIS — Q07.00 ARNOLD-CHIARI SYNDROME WITHOUT SPINA BIFIDA OR HYDROCEPHALUS (MULTI): ICD-10-CM

## 2024-10-30 DIAGNOSIS — G95.0 SYRINGOMYELIA (MULTI): ICD-10-CM

## 2024-10-30 PROCEDURE — 3044F HG A1C LEVEL LT 7.0%: CPT | Performed by: PSYCHIATRY & NEUROLOGY

## 2024-10-30 PROCEDURE — 3078F DIAST BP <80 MM HG: CPT | Performed by: PSYCHIATRY & NEUROLOGY

## 2024-10-30 PROCEDURE — 3074F SYST BP LT 130 MM HG: CPT | Performed by: PSYCHIATRY & NEUROLOGY

## 2024-10-30 PROCEDURE — 3008F BODY MASS INDEX DOCD: CPT | Performed by: PSYCHIATRY & NEUROLOGY

## 2024-10-30 PROCEDURE — 4004F PT TOBACCO SCREEN RCVD TLK: CPT | Performed by: PSYCHIATRY & NEUROLOGY

## 2024-10-30 PROCEDURE — 3048F LDL-C <100 MG/DL: CPT | Performed by: PSYCHIATRY & NEUROLOGY

## 2024-10-30 PROCEDURE — 99214 OFFICE O/P EST MOD 30 MIN: CPT | Performed by: PSYCHIATRY & NEUROLOGY

## 2024-10-30 PROCEDURE — 3061F NEG MICROALBUMINURIA REV: CPT | Performed by: PSYCHIATRY & NEUROLOGY

## 2024-10-30 RX ORDER — TOPIRAMATE 50 MG/1
100 TABLET, FILM COATED ORAL 2 TIMES DAILY
Qty: 120 TABLET | Refills: 6 | Status: SHIPPED | OUTPATIENT
Start: 2024-10-30

## 2024-10-30 ASSESSMENT — ENCOUNTER SYMPTOMS
LOSS OF SENSATION IN FEET: 0
FREQUENCY: 0
NECK STIFFNESS: 0
ABDOMINAL PAIN: 0
ARTHRALGIAS: 0
HALLUCINATIONS: 0
PALPITATIONS: 0
DIFFICULTY URINATING: 0
SLEEP DISTURBANCE: 0
DIZZINESS: 0
HEADACHES: 1
TROUBLE SWALLOWING: 0
DEPRESSION: 1
SHORTNESS OF BREATH: 0
WEAKNESS: 0
OCCASIONAL FEELINGS OF UNSTEADINESS: 0
FACIAL ASYMMETRY: 0
FATIGUE: 0
WHEEZING: 0
UNEXPECTED WEIGHT CHANGE: 0
AGITATION: 0
EYE PAIN: 0
SPEECH DIFFICULTY: 0
BACK PAIN: 0
TREMORS: 0
SEIZURES: 1
LIGHT-HEADEDNESS: 0
JOINT SWELLING: 0
SINUS PRESSURE: 0
CONFUSION: 0
NECK PAIN: 0
BRUISES/BLEEDS EASILY: 0
HYPERACTIVE: 0
COUGH: 0
VOMITING: 0
NAUSEA: 0
NUMBNESS: 1
ADENOPATHY: 0
PHOTOPHOBIA: 0
FEVER: 0

## 2024-10-30 ASSESSMENT — PATIENT HEALTH QUESTIONNAIRE - PHQ9
1. LITTLE INTEREST OR PLEASURE IN DOING THINGS: NOT AT ALL
SUM OF ALL RESPONSES TO PHQ9 QUESTIONS 1 & 2: 0
2. FEELING DOWN, DEPRESSED OR HOPELESS: NOT AT ALL

## 2024-11-27 ENCOUNTER — HOSPITAL ENCOUNTER (OUTPATIENT)
Dept: NEUROLOGY | Facility: HOSPITAL | Age: 49
Discharge: HOME | End: 2024-11-27
Payer: COMMERCIAL

## 2024-11-27 DIAGNOSIS — G40.219 PARTIAL SYMPTOMATIC EPILEPSY WITH COMPLEX PARTIAL SEIZURES, INTRACTABLE, WITHOUT STATUS EPILEPTICUS (MULTI): ICD-10-CM

## 2024-11-27 PROCEDURE — 95816 EEG AWAKE AND DROWSY: CPT

## 2024-12-02 ENCOUNTER — APPOINTMENT (OUTPATIENT)
Dept: OTOLARYNGOLOGY | Facility: CLINIC | Age: 49
End: 2024-12-02
Payer: COMMERCIAL

## 2024-12-02 VITALS
BODY MASS INDEX: 34.88 KG/M2 | HEIGHT: 65 IN | TEMPERATURE: 97.2 F | SYSTOLIC BLOOD PRESSURE: 112 MMHG | DIASTOLIC BLOOD PRESSURE: 66 MMHG

## 2024-12-02 DIAGNOSIS — Z96.22 HISTORY OF TYMPANOSTOMY TUBE PLACEMENT: Primary | ICD-10-CM

## 2024-12-02 DIAGNOSIS — R42 VERTIGO: ICD-10-CM

## 2024-12-02 PROCEDURE — 3044F HG A1C LEVEL LT 7.0%: CPT | Performed by: OTOLARYNGOLOGY

## 2024-12-02 PROCEDURE — 3061F NEG MICROALBUMINURIA REV: CPT | Performed by: OTOLARYNGOLOGY

## 2024-12-02 PROCEDURE — 3074F SYST BP LT 130 MM HG: CPT | Performed by: OTOLARYNGOLOGY

## 2024-12-02 PROCEDURE — 3048F LDL-C <100 MG/DL: CPT | Performed by: OTOLARYNGOLOGY

## 2024-12-02 PROCEDURE — 4004F PT TOBACCO SCREEN RCVD TLK: CPT | Performed by: OTOLARYNGOLOGY

## 2024-12-02 PROCEDURE — 99214 OFFICE O/P EST MOD 30 MIN: CPT | Performed by: OTOLARYNGOLOGY

## 2024-12-02 PROCEDURE — 3078F DIAST BP <80 MM HG: CPT | Performed by: OTOLARYNGOLOGY

## 2024-12-02 NOTE — PROGRESS NOTES
Impression:              1. History of tympanostomy tube placement        2. Vertigo             Recommendations/Plan:  I reassured the patient there is no evidence of any impacted cerumen today.  Her PE tube is functioning normally and there is no evidence of any infectious drainage.  She must continue to keep all water out of that right ear.  We will try to coordinate a referral to vestibular therapy for an evaluation regarding her persistent vertigo.    **This electronic medical record note was created with the use of voice recognition software.  Despite proofreading, typographical or grammatical errors may be present that could affect meaning of content **    Subjective:  Ruth returns to the office today as a checkup on her ears.  She denies any infectious drainage fever or chills.  She did notice a large amount of black cerumen draining from her right ear.  No problems in the left ear.  Recently she has noticed some true spinning and this seems to worsen whenever she extends her neck backward.  She is touching base with her neurosurgeon over the next week or so.    Objective:    Visit Vitals  /66   Temp 36.2 °C (97.2 °F) (Temporal)        Current Outpatient Medications   Medication Instructions    albuterol (Ventolin HFA) 90 mcg/actuation inhaler 2 puffs, Every 6 hours PRN    aspirin 81 mg, oral, Daily    atorvastatin (LIPITOR) 80 mg, oral, Nightly    blood sugar diagnostic (OneTouch Verio test strips) strip Test blood sugars 4 times a day as directed    brivaracetam (BRIVIACT) 200 mg, oral, 2 times daily    buPROPion SR (Wellbutrin SR) 150 mg 12 hr tablet Take 1 tablet (150 mg) by mouth 3 times a day.    cholecalciferol (VITAMIN D-3) 2,000 Units, Daily    clotrimazole (Lotrimin) 1 % cream 1 Application, As needed    diclofenac sodium (Voltaren) 1 % gel gel APPLY 4 INCHES OF MEDICATION TO EACH KNEE EVERY 4 HOURS AS NEEDED    dulaglutide (TRULICITY) 0.75 mg, subcutaneous, Once Weekly     ergocalciferol (VITAMIN D-2) 50 mcg, oral, Daily    ezetimibe (ZETIA) 10 mg, oral, Daily    fenofibrate (TRICOR) 145 mg, Daily    gabapentin (NEURONTIN) 1,200 mg, 3 times daily    ibuprofen 800 mg, oral, Every 8 hours PRN    lamoTRIgine (LAMICTAL) 200 mg, Daily    lancets (OneTouch Delica Plus Lancet) 30 gauge misc Check BG    lubiprostone (AMITIZA) 24 mcg, 2 times daily (morning and late afternoon)    metoprolol tartrate (LOPRESSOR) 25 mg, oral, 2 times daily    montelukast (SINGULAIR) 10 mg, Nightly    ondansetron (ZOFRAN) 8 mg, Every 8 hours PRN    pantoprazole (ProtoNix) 40 mg EC tablet 1 tablet, 2 times daily    sucralfate (CARAFATE) 1 g, 4 times daily before meals and nightly    topiramate (TOPAMAX) 100 mg, oral, 2 times daily    umeclidinium-vilanteroL (Anoro Ellipta) 62.5-25 mcg/actuation blister with device 1 puff, Daily    valACYclovir (VALTREX) 500 mg, Daily        Allergies   Allergen Reactions    Fish Containing Products Anaphylaxis    Iodinated Contrast Media Anaphylaxis    Duloxetine Unknown    Pregabalin Unknown        Physical Exam:  Right ear-external canal is patent.  PE tube is functioning normally.  No drainage or signs of infection.  Mastoid nontender.  Left ear-external canal is patent.  Tympanic membrane intact.  No effusion.  Mastoid nontender.  Nose-clear no rhinorrhea    Results:  []    Procedure:  []    Marcin Montenegro, DO

## 2024-12-16 ENCOUNTER — APPOINTMENT (OUTPATIENT)
Dept: NEUROSURGERY | Facility: CLINIC | Age: 49
End: 2024-12-16
Payer: COMMERCIAL

## 2024-12-16 VITALS
RESPIRATION RATE: 18 BRPM | WEIGHT: 220 LBS | DIASTOLIC BLOOD PRESSURE: 74 MMHG | BODY MASS INDEX: 36.65 KG/M2 | HEART RATE: 84 BPM | HEIGHT: 65 IN | SYSTOLIC BLOOD PRESSURE: 122 MMHG | TEMPERATURE: 97.3 F

## 2024-12-16 DIAGNOSIS — Q07.00 ARNOLD-CHIARI SYNDROME WITHOUT SPINA BIFIDA OR HYDROCEPHALUS (MULTI): Primary | ICD-10-CM

## 2024-12-16 PROCEDURE — 3061F NEG MICROALBUMINURIA REV: CPT | Performed by: NEUROLOGICAL SURGERY

## 2024-12-16 PROCEDURE — 3044F HG A1C LEVEL LT 7.0%: CPT | Performed by: NEUROLOGICAL SURGERY

## 2024-12-16 PROCEDURE — 3074F SYST BP LT 130 MM HG: CPT | Performed by: NEUROLOGICAL SURGERY

## 2024-12-16 PROCEDURE — 3008F BODY MASS INDEX DOCD: CPT | Performed by: NEUROLOGICAL SURGERY

## 2024-12-16 PROCEDURE — 3048F LDL-C <100 MG/DL: CPT | Performed by: NEUROLOGICAL SURGERY

## 2024-12-16 PROCEDURE — 4004F PT TOBACCO SCREEN RCVD TLK: CPT | Performed by: NEUROLOGICAL SURGERY

## 2024-12-16 PROCEDURE — 3078F DIAST BP <80 MM HG: CPT | Performed by: NEUROLOGICAL SURGERY

## 2024-12-16 PROCEDURE — 99212 OFFICE O/P EST SF 10 MIN: CPT | Performed by: NEUROLOGICAL SURGERY

## 2024-12-16 ASSESSMENT — PAIN SCALES - GENERAL: PAINLEVEL_OUTOF10: 0-NO PAIN

## 2024-12-16 NOTE — PROGRESS NOTES
1- Chiari decompression. Today is having back of head and neck pain and gets a knot popping out, like a blueberry. Having seizures. Is under a lot of stress. This has been going on for around 9 months.    49-year-old woman who underwent surgery for Chiari decompression in 2022 returns for follow-up.  She is complaining about neck pain and a feeling of fullness at the region of her incision.  The region of fullness can go up and down during the day.  The neck pain can be as severe, particularly at night.    On exam, the patient is alert and interactive.  Her incision is well-healed there has been some atrophy of the soft tissue in the intermuscular plane.  She moves all extremities with good strength.    At this time the etiology of the patient's symptoms is not clear.  She relates that she has multiple areas of vascular stenosis or obstruction.  As such, I think would be extremely unlikely that she would be a candidate for any further surgical intervention.  In light of this, I think a new MRI to evaluate the Chiari would be superfluous.  I will refer her for a pain management evaluation to discuss nonoperative means of symptom control.

## 2024-12-26 ENCOUNTER — TELEPHONE (OUTPATIENT)
Dept: PAIN MEDICINE | Facility: CLINIC | Age: 49
End: 2024-12-26
Payer: COMMERCIAL

## 2025-01-07 ENCOUNTER — HOSPITAL ENCOUNTER (OUTPATIENT)
Dept: CARDIOLOGY | Facility: HOSPITAL | Age: 50
Discharge: HOME | End: 2025-01-07
Payer: COMMERCIAL

## 2025-01-07 DIAGNOSIS — Z95.0 CARDIAC PACEMAKER IN SITU: ICD-10-CM

## 2025-01-07 PROCEDURE — 93296 REM INTERROG EVL PM/IDS: CPT

## 2025-01-07 PROCEDURE — 93294 REM INTERROG EVL PM/LDLS PM: CPT | Performed by: INTERNAL MEDICINE

## 2025-02-13 DIAGNOSIS — R00.0 WIDE-COMPLEX TACHYCARDIA: ICD-10-CM

## 2025-02-13 RX ORDER — METOPROLOL TARTRATE 25 MG/1
25 TABLET, FILM COATED ORAL 2 TIMES DAILY
Qty: 180 TABLET | Refills: 3 | Status: SHIPPED | OUTPATIENT
Start: 2025-02-13 | End: 2026-02-13

## 2025-02-13 NOTE — TELEPHONE ENCOUNTER
Received request for prescription refill for patient.  Patient follows with Dr. Latrell Monson MD    Request is for metoprolol   Is patient currently on medication- yes    Last OV- 10/2/24  Next OV- 4/23/25    Pended for signing and sent to provider.

## 2025-02-19 ENCOUNTER — APPOINTMENT (OUTPATIENT)
Dept: NEUROLOGY | Facility: CLINIC | Age: 50
End: 2025-02-19
Payer: COMMERCIAL

## 2025-02-20 ENCOUNTER — APPOINTMENT (OUTPATIENT)
Dept: ENDOCRINOLOGY | Facility: CLINIC | Age: 50
End: 2025-02-20
Payer: COMMERCIAL

## 2025-02-20 VITALS
DIASTOLIC BLOOD PRESSURE: 65 MMHG | HEART RATE: 83 BPM | WEIGHT: 215 LBS | HEIGHT: 65 IN | TEMPERATURE: 97.7 F | SYSTOLIC BLOOD PRESSURE: 96 MMHG | BODY MASS INDEX: 35.82 KG/M2

## 2025-02-20 DIAGNOSIS — E11.69 TYPE 2 DIABETES MELLITUS WITH OTHER SPECIFIED COMPLICATION, UNSPECIFIED WHETHER LONG TERM INSULIN USE (MULTI): ICD-10-CM

## 2025-02-20 DIAGNOSIS — E66.01 MORBID OBESITY (MULTI): ICD-10-CM

## 2025-02-20 DIAGNOSIS — E04.2 MULTINODULAR GOITER (NONTOXIC): ICD-10-CM

## 2025-02-20 DIAGNOSIS — E11.9 DIABETES MELLITUS TYPE II, NON INSULIN DEPENDENT (MULTI): Primary | ICD-10-CM

## 2025-02-20 DIAGNOSIS — E66.01 CLASS 2 SEVERE OBESITY WITH SERIOUS COMORBIDITY AND BODY MASS INDEX (BMI) OF 35.0 TO 35.9 IN ADULT, UNSPECIFIED OBESITY TYPE: ICD-10-CM

## 2025-02-20 DIAGNOSIS — E66.812 CLASS 2 SEVERE OBESITY WITH SERIOUS COMORBIDITY AND BODY MASS INDEX (BMI) OF 35.0 TO 35.9 IN ADULT, UNSPECIFIED OBESITY TYPE: ICD-10-CM

## 2025-02-20 LAB — POC HEMOGLOBIN A1C: 7.6 % (ref 4.2–6.5)

## 2025-02-20 PROCEDURE — 3008F BODY MASS INDEX DOCD: CPT | Performed by: STUDENT IN AN ORGANIZED HEALTH CARE EDUCATION/TRAINING PROGRAM

## 2025-02-20 PROCEDURE — 99214 OFFICE O/P EST MOD 30 MIN: CPT | Performed by: STUDENT IN AN ORGANIZED HEALTH CARE EDUCATION/TRAINING PROGRAM

## 2025-02-20 PROCEDURE — 3078F DIAST BP <80 MM HG: CPT | Performed by: STUDENT IN AN ORGANIZED HEALTH CARE EDUCATION/TRAINING PROGRAM

## 2025-02-20 PROCEDURE — 3074F SYST BP LT 130 MM HG: CPT | Performed by: STUDENT IN AN ORGANIZED HEALTH CARE EDUCATION/TRAINING PROGRAM

## 2025-02-20 RX ORDER — DEXAMETHASONE 1 MG/1
1 TABLET ORAL ONCE
Qty: 1 TABLET | Refills: 0 | Status: SHIPPED | OUTPATIENT
Start: 2025-02-20 | End: 2025-03-13 | Stop reason: ALTCHOICE

## 2025-02-20 RX ORDER — DULAGLUTIDE 3 MG/.5ML
3 INJECTION, SOLUTION SUBCUTANEOUS WEEKLY
Qty: 2 ML | Refills: 3 | Status: SHIPPED | OUTPATIENT
Start: 2025-02-20

## 2025-02-20 NOTE — PROGRESS NOTES
"Patient coming in for follow up for T2DM    Subjective   Ruth Marin is a 49 y.o. female who presents for follow up for Type 2 diabetes mellitus.     Lab Results   Component Value Date    HGBA1C 5.8 (H) 2024      Ruth Marin is a 49 y.o. female who presents for follow-up of Type 2 diabetes mellitus. The initial diagnosis of diabetes was made  over 10 years ago . The patient does have a known family history of diabetes.  Current meds:  Trulicity 0.75 mg once weekly  Was 420lbs now 197lbs. Gained 18lbs.  Per patient PCP increased trulicity in January to trulicity 1.5 mg lost 5 lbs since Dec  Still working on stopping smoking Now using vape tobacco vape  Was previously on depot shots and then OCP  Checked BG random and its always in the normal range. Checks it 5 times a week 112  Per patient sometimes 70s.  Per patient was diagnosed with cushings and was on korlym? Per records saw Dr. Singh and per notes 24 hour urine showed cortisol of 19.  Stopped Korlym Dec 2020  Has been trying to eat better  Currently doing portion control  Has a pacemaker   Had a brain surgery due to [place] chiari syndrome.   Follows with PCP and prescribes gabapentin 1200 mgTID   Getting set up for L knee surgery and aortic stent placement. Hopes to find a new plastic surgeon who accepts her insurance for pannus skin reduction.  Foot Exam: Not following with podiatry. On Gabapentin  Eye Exam: was seen last oct 2023  Lipid Panel: atorvastatin 80 mg and fenofibrate LDL: 96 and T  Urine Albumin: UACR in Oct 2023 18.3. Was previously on lisinopril   Per patient she can grow a beard  She shaves her face daily Last Testosterone elevated 71    Review of Systems  all pertinent systems reviewed and are otherwise negative   Objective   BP 96/65 (BP Location: Left arm, Patient Position: Sitting, BP Cuff Size: Large adult)   Pulse 83   Temp 36.5 °C (97.7 °F)   Ht 1.651 m (5' 5\")   Wt 97.5 kg (215 lb)   BMI 35.78 kg/m² "   Physical Exam  Constitutional:       General: She is not in acute distress.     Appearance: Normal appearance.   HENT:      Head: Normocephalic and atraumatic.   Eyes:      Extraocular Movements: Extraocular movements intact.      Pupils: Pupils are equal, round, and reactive to light.   Cardiovascular:      Rate and Rhythm: Normal rate and regular rhythm.   Pulmonary:      Effort: Pulmonary effort is normal. No respiratory distress.      Breath sounds: Normal breath sounds.   Abdominal:      General: Bowel sounds are normal.      Palpations: Abdomen is soft.      Tenderness: There is no abdominal tenderness.   Skin:     Coloration: Skin is not jaundiced or pale.      Findings: No erythema or rash.   Neurological:      General: No focal deficit present.      Mental Status: She is alert and oriented to person, place, and time.      Deep Tendon Reflexes: Reflexes normal.   Psychiatric:         Mood and Affect: Mood normal.         Behavior: Behavior normal.         Lab Review  Glucose (mg/dL)   Date Value   08/22/2024 97   06/25/2024 142 (H)   02/02/2024 126 (H)     Hemoglobin A1C (%)   Date Value   08/22/2024 5.8 (H)   10/31/2023 5.6   04/04/2023 5.9 (A)   01/13/2022 8.5 (A)   06/15/2021 7.5     Bicarbonate (mmol/L)   Date Value   08/22/2024 19 (L)   06/25/2024 20 (L)   02/02/2024 21     Urea Nitrogen (mg/dL)   Date Value   08/22/2024 21   06/25/2024 13   02/02/2024 15     Creatinine (mg/dL)   Date Value   08/22/2024 0.75   06/25/2024 0.64   02/02/2024 0.79     Lab Results   Component Value Date    CHOL 183 08/22/2024    CHOL 161 10/31/2023    CHOL 292 (H) 09/05/2019     Lab Results   Component Value Date    HDL 32.0 08/22/2024    HDL 33.1 10/31/2023    HDL 18.7 (A) 09/05/2019     Lab Results   Component Value Date    LDLCALC 93 08/22/2024    LDLCALC 96 10/31/2023     Lab Results   Component Value Date    TRIG 288 (H) 08/22/2024    TRIG 161 (H) 10/31/2023    TRIG 489 (H) 09/05/2019     No components found for:  "\"CHOLHDL\"   Lab Results   Component Value Date    TSH 0.94 2024     No results found for: \"ALBUR\", \"[id number]\"     Health Maintenance:       Assessment/Plan   Ruth Marin is a 49 y.o. female who presents for follow-up of Type 2 diabetes mellitus. The initial diagnosis of diabetes was made  over 10 years ago . The patient does have a known family history of diabetes.  Current meds:  Trulicity 0.75 mg once weekly  Was 420lbs now 197lbs. Gained 18lbs.  Per patient PCP increased trulicity in January to trulicity 1.5 mg lost 5 lbs since Dec  Still working on stopping smoking Now using vape tobacco vape  Was previously on depot shots and then OCP  Checked BG random and its always in the normal range. Checks it 5 times a week 112  Per patient sometimes 70s.  Per patient was diagnosed with cushings and was on korlym? Per records saw Dr. Singh and per notes 24 hour urine showed cortisol of 19.  Stopped Korlym Dec 2020  Has been trying to eat better  Currently doing portion control  Has a pacemaker   Had a brain surgery due to [place] chiari syndrome.   Follows with PCP and prescribes gabapentin 1200 mgTID   Getting set up for L knee surgery and aortic stent placement. Hopes to find a new plastic surgeon who accepts her insurance for pannus skin reduction.  Foot Exam: Not following with podiatry. On Gabapentin  Eye Exam: was seen last oct 2023  Lipid Panel: atorvastatin 80 mg and fenofibrate LDL: 96 and T  Urine Albumin: UACR in Oct 2023 18.3. Was previously on lisinopril   Per patient she can grow a beard  She shaves her face daily Last Testosterone elevated 71    Plan:  Increase Trulicity to 3 mg once weekly  Continue to check BG 3-4 times a week  Monitor weight  Continue to watch diet    Dexamethasone suppression test       RTC in 6 months  Assessment & Plan  Diabetes mellitus type II, non insulin dependent (Multi)    Orders:    dulaglutide (Trulicity) 3 mg/0.5 mL injection; Inject 3 mg under the skin 1 " (one) time per week.    Class 2 severe obesity with serious comorbidity and body mass index (BMI) of 35.0 to 35.9 in adult, unspecified obesity type    Orders:    Follow Up In Endocrinology; Future    Adrenocorticotropic Hormone (ACTH); Future    Cortisol AM; Future    Dexamethasone; Future    DHEA-Sulfate; Future    dulaglutide (Trulicity) 3 mg/0.5 mL injection; Inject 3 mg under the skin 1 (one) time per week.    Multinodular goiter (nontoxic)         Morbid obesity (Multi)

## 2025-02-20 NOTE — PATIENT INSTRUCTIONS
Increase Trulicity to 3 mg once weekly  Continue to check BG 3-4 times a week  Monitor weight  Continue to watch diet    Dexamethasone suppression test     -take 1mg of dexamethasone around 10- 11 pm   -the following morning, around 8am (Time sensitive) you should go to the lab and get your bloodwork done   -this test is best done if you got good sleep at night and you follow your usual night routing      RTC in 6 months

## 2025-03-13 ENCOUNTER — APPOINTMENT (OUTPATIENT)
Dept: NEUROLOGY | Facility: CLINIC | Age: 50
End: 2025-03-13
Payer: COMMERCIAL

## 2025-03-13 VITALS
SYSTOLIC BLOOD PRESSURE: 120 MMHG | HEIGHT: 65 IN | DIASTOLIC BLOOD PRESSURE: 68 MMHG | BODY MASS INDEX: 36.06 KG/M2 | WEIGHT: 216.4 LBS

## 2025-03-13 DIAGNOSIS — R56.9 SEIZURE (MULTI): Primary | ICD-10-CM

## 2025-03-13 DIAGNOSIS — E11.42 DIABETIC POLYNEUROPATHY ASSOCIATED WITH TYPE 2 DIABETES MELLITUS (MULTI): ICD-10-CM

## 2025-03-13 DIAGNOSIS — Q07.00 ARNOLD-CHIARI SYNDROME WITHOUT SPINA BIFIDA OR HYDROCEPHALUS (MULTI): ICD-10-CM

## 2025-03-13 DIAGNOSIS — G40.219 PARTIAL SYMPTOMATIC EPILEPSY WITH COMPLEX PARTIAL SEIZURES, INTRACTABLE, WITHOUT STATUS EPILEPTICUS (MULTI): ICD-10-CM

## 2025-03-13 PROCEDURE — 3078F DIAST BP <80 MM HG: CPT | Performed by: PSYCHIATRY & NEUROLOGY

## 2025-03-13 PROCEDURE — 3008F BODY MASS INDEX DOCD: CPT | Performed by: PSYCHIATRY & NEUROLOGY

## 2025-03-13 PROCEDURE — 99214 OFFICE O/P EST MOD 30 MIN: CPT | Performed by: PSYCHIATRY & NEUROLOGY

## 2025-03-13 PROCEDURE — 3074F SYST BP LT 130 MM HG: CPT | Performed by: PSYCHIATRY & NEUROLOGY

## 2025-03-13 RX ORDER — TOPIRAMATE 100 MG/1
100 TABLET, FILM COATED ORAL 2 TIMES DAILY
Qty: 60 TABLET | Refills: 6 | Status: SHIPPED | OUTPATIENT
Start: 2025-03-13

## 2025-03-13 ASSESSMENT — ENCOUNTER SYMPTOMS
SINUS PRESSURE: 0
ARTHRALGIAS: 1
FEVER: 0
DIFFICULTY URINATING: 0
NECK PAIN: 0
ADENOPATHY: 0
PALPITATIONS: 0
PHOTOPHOBIA: 0
UNEXPECTED WEIGHT CHANGE: 0
NAUSEA: 0
HALLUCINATIONS: 0
ABDOMINAL PAIN: 0
FATIGUE: 0
COUGH: 0
FACIAL ASYMMETRY: 0
NUMBNESS: 0
HYPERACTIVE: 0
SPEECH DIFFICULTY: 0
VOMITING: 0
WHEEZING: 0
BRUISES/BLEEDS EASILY: 0
EYE PAIN: 0
TREMORS: 0
LIGHT-HEADEDNESS: 0
BACK PAIN: 1
JOINT SWELLING: 0
TROUBLE SWALLOWING: 0
WEAKNESS: 0
DIZZINESS: 0
SHORTNESS OF BREATH: 0
CONFUSION: 0
SLEEP DISTURBANCE: 0
SEIZURES: 1
FREQUENCY: 0
AGITATION: 0
HEADACHES: 1
NECK STIFFNESS: 0

## 2025-03-13 ASSESSMENT — PATIENT HEALTH QUESTIONNAIRE - PHQ9
SUM OF ALL RESPONSES TO PHQ9 QUESTIONS 1 & 2: 0
2. FEELING DOWN, DEPRESSED OR HOPELESS: NOT AT ALL
1. LITTLE INTEREST OR PLEASURE IN DOING THINGS: NOT AT ALL

## 2025-03-13 NOTE — PROGRESS NOTES
Ruth Marin  49 y.o.       SUBJECTIVE  Ruth is a 49-year-old young lady who was seen today for follow-up of her partial complex seizure with secondary generalization with history of Chiari malformation, syringomyelia and multiple other medical issues.  Since last seen she has been doing very well on Briviact and Topamax and has been seizure-free.  She has been complaining of aches and pains and does complain of knee pain for which she is seeing an orthopedic surgeon    I would like to continue her medication the way she is taking and I discussed the precautions to be taken and depending on how she does but make future recommendation when she comes back to see me in 6 months    I did review the medication list.  Due to technical limitations of voice recognition and human error, this note may not accurately reflect the care of the patient.    Review of Systems   Constitutional:  Negative for fatigue, fever and unexpected weight change.   HENT:  Negative for dental problem, ear pain, hearing loss, sinus pressure, tinnitus and trouble swallowing.    Eyes:  Negative for photophobia, pain and visual disturbance.   Respiratory:  Negative for cough, shortness of breath and wheezing.    Cardiovascular:  Negative for chest pain, palpitations and leg swelling.   Gastrointestinal:  Negative for abdominal pain, nausea and vomiting.   Genitourinary:  Negative for difficulty urinating, enuresis and frequency.   Musculoskeletal:  Positive for arthralgias, back pain and gait problem. Negative for joint swelling, neck pain and neck stiffness.   Skin:  Negative for pallor and rash.   Allergic/Immunologic: Negative for food allergies.   Neurological:  Positive for seizures and headaches. Negative for dizziness, tremors, syncope, facial asymmetry, speech difficulty, weakness, light-headedness and numbness.   Hematological:  Negative for adenopathy. Does not bruise/bleed easily.   Psychiatric/Behavioral:  Negative for  agitation, behavioral problems, confusion, hallucinations and sleep disturbance. The patient is not hyperactive.         Patient Active Problem List   Diagnosis    Diabetes mellitus type II, non insulin dependent (Multi)    Mixed conductive and sensorineural hearing loss of right ear with restricted hearing of left ear    Arnold-Chiari syndrome without spina bifida or hydrocephalus (Multi)    Diabetic neuropathy (Multi)    Fluid level behind tympanic membrane of right ear    History of thyroid nodule    Hyperlipidemia LDL goal <100    Hypokalemia    Multinodular goiter (nontoxic)    Nausea and vomiting    PCOS (polycystic ovarian syndrome)    Seizure disorder (Multi)    Sensorineural hearing loss (SNHL) of left ear with restricted hearing of right ear    Syringomyelia (Multi)    Unilateral headache    Vitamin D deficiency    Bradycardia    Obesity    Neuropathy    Sinus node dysfunction (Multi)    Syncope and collapse    Vitamin B12 deficiency    Current every day smoker    Other emphysema (Multi)    Abnormal loss of weight    Acid reflux    Anxiety    Aortoiliac occlusive disease (Multi)    Atherosclerosis of native artery of both lower extremities with intermittent claudication (CMS-HCC)    Brunner's gland hyperplasia of duodenum    Chronic midline low back pain with bilateral sciatica    Constipation, unspecified    Cramps, extremity    Depression    Disturbance of skin sensation    Drug abuse (Multi)    DUB (dysfunctional uterine bleeding)    Epigastric pain    Genital HSV    H/O ETOH abuse    Irregular menses    ITB syndrome    Melena    Menorrhagia    PEGGY on CPAP    Sleep apnea    Rectal bleed    Thyroid nodule    Tubular adenoma of colon    HTN (hypertension), benign    Diabetes mellitus (Multi)    Diabetes (Multi)    High cholesterol    Hypercholesterolemia    Asymmetric SNHL (sensorineural hearing loss)    Morbid obesity (Multi)    Chronic obstructive pulmonary disease (Multi)    COPD with asthma (Multi)     Epilepsy    Seizure (Multi)    Headache disorder    BMI 32.0-32.9,adult    Wide-complex tachycardia    Palpitations    Night sweats    Chest pain    Shortness of breath    Status post placement of implantable loop recorder    NSTEMI (non-ST elevated myocardial infarction) (Multi)    Carotid bruit    Bipolar 2 disorder, major depressive episode (Multi)    NSVT (nonsustained ventricular tachycardia) (Multi)    Sick sinus syndrome (Multi)    Dizziness    Cyst of pituitary gland (Multi)    Liver failure (Multi)    Benign essential hypertension    Asthma-chronic obstructive pulmonary disease overlap syndrome (Multi)    Pituitary Cushing's syndrome (Multi)     Past Medical History:   Diagnosis Date    Arrhythmia     Asthma     Current smoker     Diabetes mellitus (Multi)     Disease of thyroid gland     History of Chiari malformation     Hypercortisolism (Multi) 09/21/2023    Hyperlipidemia     Hypertension     OCD (obsessive compulsive disorder)     Other disorders of lung     Lung trouble    Personal history of other diseases of the digestive system     History of gastroesophageal reflux (GERD)    Personal history of other diseases of the musculoskeletal system and connective tissue     History of arthritis    Personal history of other diseases of the nervous system and sense organs     History of sleep apnea    Personal history of other diseases of the respiratory system     History of chronic obstructive lung disease    Personal history of other diseases of the respiratory system     History of bronchitis    Personal history of other diseases of the respiratory system     History of asthma    Personal history of other endocrine, nutritional and metabolic disease     History of diabetes mellitus    Personal history of other endocrine, nutritional and metabolic disease     History of thyroid disorder    Personal history of other mental and behavioral disorders     History of depression    Personal history of other  "mental and behavioral disorders     History of mental disorder    Personal history of other specified conditions     History of snoring     Past Surgical History:   Procedure Laterality Date    BRAIN SURGERY      CARDIAC ELECTROPHYSIOLOGY PROCEDURE Left 2/2/2024    Procedure: Loop Insertion;  Surgeon: Latrell Monson MD;  Location: [facility];  Service: Electrophysiology;  Laterality: Left;    CARDIAC ELECTROPHYSIOLOGY PROCEDURE Left 6/25/2024    Procedure: Loop Recorder Explant;  Surgeon: Latrell Monson MD;  Location: [facility];  Service: Electrophysiology;  Laterality: Left;    CARDIAC ELECTROPHYSIOLOGY PROCEDURE Left 6/25/2024    Procedure: PPM IMPLANT DUAL;  Surgeon: Latrell Monson MD;  Location: [facility];  Service: Electrophysiology;  Laterality: Left;    GALLBLADDER SURGERY  12/07/2017    Gallbladder Surgery    TONSILLECTOMY  12/07/2017    Tonsillectomy With Adenoidectomy       reports that she has been smoking cigarettes. She has never used smokeless tobacco. She reports that she does not currently use alcohol. She reports current drug use. Drug: Marijuana.    /68 (BP Location: Left arm, Patient Position: Sitting, BP Cuff Size: Adult)   Ht 1.638 m (5' 4.5\")   Wt 98.2 kg (216 lb 6.4 oz)   BMI 36.57 kg/m²     OBJECTIVE  Physical Exam/Neurological Exam   Constitutional: General appearance: no acute distress .  Patient has quite a few coarse hairs and on the face probably from hirsutism.  Auscultation of Heart: Regular rate and rhythm, no murmurs, normal S1 and S2.   Carotid Arteries: Intact without any bruits.   Neck is supple.   No lymph adenopathy.   Peripheral Vascular Exam: Pulses 1 +and equal in all extremities. No swelling, varicosities in lower extremities,, edema or tenderness to palpations.   Abdomen is soft, nondistended. No organomegaly.  Mental status: The patient was in no distress, alert, interactive and cooperative. Affect is appropriate.   Orientation: " oriented to person, oriented to place and oriented to time.   Memory: recent memory intact and remote memory intact.   Attention: normal attention span and normal concentrating ability.   Language: normal comprehension and no difficulty naming common objects.   Fund of knowledge: Patient displays adequate knowledge of current events, adequate fund of knowledge regarding past history and adequate fund of knowledge regarding vocabulary.   Eyes: The ophthalmoscopic examination was normal. The fundi are visualized with normal disc margins and without.  Cranial nerve II: Visual fields full to confrontation.   Cranial nerves III, IV, and VI: Pupils round, equally reactive to light; no ptosis. EOMs intact. No nystagmus.   Cranial Nerve V: Facial sensation intact bilaterally.   Cranial nerve VII: Normal and symmetric facial strength.   Cranial nerve VIII: Hearing is impaired in the right ear but normal in the left ear.  Cranial nerves IX and X: Palate elevates symmetrically.   Cranial nerve XI: Shoulder shrug and neck rotation strength are intact.   Cranial nerve XII: Tongue midline with normal strength.   Motor: Motor exam was normal. Muscle bulk was normal in both upper and lower extremities. Muscle tone was normal in both upper and lower extremities. Muscle strength was 5/5 throughout. no abnormal or adventitious movements were present.   Deep Tendon Reflexes: left biceps 2+ , right biceps 2+, left triceps 2+, right triceps 2+, left brachioradialis 2+, right brachioradialis 2+, left patella 2+, right patella 2+, left ankle jerk 2+, right ankle jerk 2+   Plantar Reflex: Toes downgoing to plantar stimulation on the left. Toes downgoing to plantar stimulation on the right.   Sensory Exam: Impaired to light touch and pinprick both in the hands and legs in the glove and stocking type.  Coordination: There is no limb dystaxia and rapid alternating movements are intact.  Gait: Gait is ataxic but she was able to ambulate without  any assistance.      ASSESSMENT/PLAN  Diagnoses and all orders for this visit:  Seizure (Multi)  Partial symptomatic epilepsy with complex partial seizures, intractable, without status epilepticus (Multi)  -     brivaracetam (Briviact) 100 mg tablet tablet; Take 2 tablets (200 mg) by mouth 2 times a day.  -     topiramate (Topamax) 100 mg tablet; Take 1 tablet (100 mg) by mouth 2 times a day.  Arnold-Chiari syndrome without spina bifida or hydrocephalus (Multi)  Diabetic polyneuropathy associated with type 2 diabetes mellitus (Multi)        Lenny Mcdaniels MD  3/13/2025  12:57 PM

## 2025-03-27 NOTE — ASSESSMENT & PLAN NOTE
Orders:    Follow Up In Endocrinology; Future    Adrenocorticotropic Hormone (ACTH); Future    Cortisol AM; Future    Dexamethasone; Future    DHEA-Sulfate; Future    dulaglutide (Trulicity) 3 mg/0.5 mL injection; Inject 3 mg under the skin 1 (one) time per week.

## 2025-03-27 NOTE — ASSESSMENT & PLAN NOTE
Orders:    dulaglutide (Trulicity) 3 mg/0.5 mL injection; Inject 3 mg under the skin 1 (one) time per week.

## 2025-04-09 ENCOUNTER — APPOINTMENT (OUTPATIENT)
Dept: CARDIOLOGY | Facility: CLINIC | Age: 50
End: 2025-04-09
Payer: COMMERCIAL

## 2025-04-09 VITALS
BODY MASS INDEX: 37.39 KG/M2 | HEIGHT: 64 IN | SYSTOLIC BLOOD PRESSURE: 98 MMHG | WEIGHT: 219 LBS | HEART RATE: 68 BPM | DIASTOLIC BLOOD PRESSURE: 68 MMHG

## 2025-04-09 DIAGNOSIS — I70.213 ATHEROSCLEROSIS OF NATIVE ARTERY OF BOTH LOWER EXTREMITIES WITH INTERMITTENT CLAUDICATION: ICD-10-CM

## 2025-04-09 DIAGNOSIS — F41.9 ANXIETY: ICD-10-CM

## 2025-04-09 DIAGNOSIS — R07.9 CHEST PAIN, UNSPECIFIED TYPE: ICD-10-CM

## 2025-04-09 DIAGNOSIS — F31.81 BIPOLAR 2 DISORDER, MAJOR DEPRESSIVE EPISODE (MULTI): Chronic | ICD-10-CM

## 2025-04-09 DIAGNOSIS — Z95.818 STATUS POST PLACEMENT OF IMPLANTABLE LOOP RECORDER: ICD-10-CM

## 2025-04-09 DIAGNOSIS — I10 HTN (HYPERTENSION), BENIGN: ICD-10-CM

## 2025-04-09 DIAGNOSIS — E78.5 HYPERLIPIDEMIA LDL GOAL <100: ICD-10-CM

## 2025-04-09 DIAGNOSIS — E11.9 DIABETES MELLITUS TYPE II, NON INSULIN DEPENDENT (MULTI): ICD-10-CM

## 2025-04-09 DIAGNOSIS — I47.29 NSVT (NONSUSTAINED VENTRICULAR TACHYCARDIA) (MULTI): ICD-10-CM

## 2025-04-09 DIAGNOSIS — Q07.00 ARNOLD-CHIARI SYNDROME WITHOUT SPINA BIFIDA OR HYDROCEPHALUS (MULTI): ICD-10-CM

## 2025-04-09 DIAGNOSIS — J44.89 ASTHMA-CHRONIC OBSTRUCTIVE PULMONARY DISEASE OVERLAP SYNDROME (MULTI): ICD-10-CM

## 2025-04-09 DIAGNOSIS — F19.10 DRUG ABUSE: ICD-10-CM

## 2025-04-09 DIAGNOSIS — I25.10 CORONARY ARTERY DISEASE INVOLVING NATIVE CORONARY ARTERY OF NATIVE HEART, UNSPECIFIED WHETHER ANGINA PRESENT: ICD-10-CM

## 2025-04-09 DIAGNOSIS — G47.33 OSA ON CPAP: ICD-10-CM

## 2025-04-09 DIAGNOSIS — F17.200 CURRENT EVERY DAY SMOKER: ICD-10-CM

## 2025-04-09 PROCEDURE — 3051F HG A1C>EQUAL 7.0%<8.0%: CPT | Performed by: INTERNAL MEDICINE

## 2025-04-09 PROCEDURE — 3074F SYST BP LT 130 MM HG: CPT | Performed by: INTERNAL MEDICINE

## 2025-04-09 PROCEDURE — 99214 OFFICE O/P EST MOD 30 MIN: CPT | Performed by: INTERNAL MEDICINE

## 2025-04-09 PROCEDURE — 3078F DIAST BP <80 MM HG: CPT | Performed by: INTERNAL MEDICINE

## 2025-04-09 PROCEDURE — 3008F BODY MASS INDEX DOCD: CPT | Performed by: INTERNAL MEDICINE

## 2025-04-09 RX ORDER — NITROGLYCERIN 0.4 MG/1
0.4 TABLET SUBLINGUAL EVERY 5 MIN PRN
Qty: 25 TABLET | Refills: 5 | Status: SHIPPED | OUTPATIENT
Start: 2025-04-09 | End: 2026-04-09

## 2025-04-09 RX ORDER — MECLIZINE HYDROCHLORIDE 25 MG/1
25 TABLET ORAL 3 TIMES DAILY PRN
COMMUNITY

## 2025-04-09 RX ORDER — LAMOTRIGINE 200 MG/1
200 TABLET ORAL DAILY
COMMUNITY

## 2025-04-09 ASSESSMENT — ENCOUNTER SYMPTOMS: DIZZINESS: 1

## 2025-04-09 NOTE — PATIENT INSTRUCTIONS
You are being scheduled for a Lexiscan stress test to be done soon  Patient to follow up after testing.  Continue same medications/treatment.  Patient educated on proper medication use.  Patient educated on risk factor modification.  Please bring any lab results from other providers / physicians to your next appointment.    Please bring all medicines, vitamins and herbal supplements with you when you come to the office.    Prescriptions will not be filled unless you are compliant with your follow up appointments or have a follow up  appointment scheduled as per instruction of your physician.  Refills should be requested at the time of  Your visit.

## 2025-04-09 NOTE — PROGRESS NOTES
CARDIOLOGY OFFICE VISIT      CHIEF COMPLAINT  Chief complaint: No chief complaint on file.       HISTORY OF PRESENT ILLNESS  The patient states that she has been having some episodes of high epigastric lower retrosternal chest discomfort which radiate to her back.  These usually occur when she is trying to walk a little bit to get into a store so she can get a cart to write around on.  She does have completely occluded distal abdominal aorta with distal collateral filling.  She also has COPD.  She does have shortness of breath with this.  She states she has had some studies done at [facility] which I do not have where it appears that she might have some right subclavian artery stenosis.  I told her she should always get her blood pressure done from the left arm.  She denies palpitations and syncope.  She has problems with vertigo.  She denies any problem with her medication.  I told her I would like to get a Lexiscan nuclear stress test and see her back.  She did have CT coronary angiogram done a little over a year ago which demonstrated mild to moderate coronary artery disease.      Impression:  Coronary Artery Disease, mild to moderate by [facility] 3/2024  History of nonsustained ventricular tachycardia on event monitor, Dr. Monson  Loop Recorder in place, Dr. Monson managing   Normal left ventricular systolic function by echocardiogram 2023  No evidence of myocardial ischemia per stress test 2023   Obesity  History of bronchial asthma  PAD / 100% Aortoiliac Occlusive Disease. Vascular at [facility] managing   Right subclavian artery stenosis  Seizure disorder  Arnold-Chiari Syndrome  Cushing Syndrome   Hypertension  Hyperlipidemia   Diabetes Mellitus, Type 2.  ENDO- Dr. Lechuga   Bipolar/ Anxiety / Drug Abuse   COPD/Asthma   Obstructive Sleep Apnea on PAP therapy   Current Smoker Daily  History of seizure disorder  Chest discomfort, possible angina pectoris    Plan:  Lexiscan nuclear stress test since patient cannot walk on a  treadmill because of significant PAD  Office visit after above for further recommendations    Please excuse any errors in grammar or translation related to this dictation.  Voice recognition software was utilized to prepare this document.      Past Medical History  Past Medical History:   Diagnosis Date    Arrhythmia     Asthma     Current smoker     Diabetes mellitus (Multi)     Disease of thyroid gland     History of Chiari malformation     Hypercortisolism (Multi) 09/21/2023    Hyperlipidemia     Hypertension     OCD (obsessive compulsive disorder)     Other disorders of lung     Lung trouble    Personal history of other diseases of the digestive system     History of gastroesophageal reflux (GERD)    Personal history of other diseases of the musculoskeletal system and connective tissue     History of arthritis    Personal history of other diseases of the nervous system and sense organs     History of sleep apnea    Personal history of other diseases of the respiratory system     History of chronic obstructive lung disease    Personal history of other diseases of the respiratory system     History of bronchitis    Personal history of other diseases of the respiratory system     History of asthma    Personal history of other endocrine, nutritional and metabolic disease     History of diabetes mellitus    Personal history of other endocrine, nutritional and metabolic disease     History of thyroid disorder    Personal history of other mental and behavioral disorders     History of depression    Personal history of other mental and behavioral disorders     History of mental disorder    Personal history of other specified conditions     History of snoring       Social History  Social History     Tobacco Use    Smoking status: Every Day     Current packs/day: 0.50     Types: Cigarettes    Smokeless tobacco: Never   Vaping Use    Vaping status: Never Used   Substance Use Topics    Alcohol use: Not Currently      Comment: quit 2008    Drug use: Yes     Types: Marijuana     Comment: 3 times per week       Family History     Family History   Problem Relation Name Age of Onset    Hypertension Mother      Arthritis Mother      No Known Problems Father          Allergies:  Allergies   Allergen Reactions    Fish Containing Products Anaphylaxis    Iodinated Contrast Media Anaphylaxis    Duloxetine Unknown    Pregabalin Unknown        Outpatient Medications:  Current Outpatient Medications   Medication Instructions    albuterol (Ventolin HFA) 90 mcg/actuation inhaler 2 puffs, Every 6 hours PRN    atorvastatin (LIPITOR) 80 mg, oral, Nightly    blood sugar diagnostic (OneTouch Verio test strips) strip Test blood sugars 4 times a day as directed    brivaracetam (BRIVIACT) 200 mg, oral, 2 times daily    buPROPion SR (Wellbutrin SR) 150 mg 12 hr tablet Take 1 tablet (150 mg) by mouth 3 times a day.    cholecalciferol (VITAMIN D-3) 2,000 Units, Daily    clotrimazole (Lotrimin) 1 % cream 1 Application, As needed    diclofenac sodium (Voltaren) 1 % gel gel APPLY 4 INCHES OF MEDICATION TO EACH KNEE EVERY 4 HOURS AS NEEDED    ergocalciferol (VITAMIN D-2) 50 mcg, oral, Daily    ezetimibe (ZETIA) 10 mg, oral, Daily    fenofibrate (TRICOR) 145 mg, Daily    ibuprofen 800 mg, oral, Every 8 hours PRN    lancets (OneTouch Delica Plus Lancet) 30 gauge misc Check BG    lubiprostone (AMITIZA) 24 mcg, 2 times daily (morning and late afternoon)    metoprolol tartrate (LOPRESSOR) 25 mg, oral, 2 times daily    montelukast (SINGULAIR) 10 mg, Nightly    ondansetron (ZOFRAN) 8 mg, Every 8 hours PRN    pantoprazole (ProtoNix) 40 mg EC tablet 1 tablet, 2 times daily    sucralfate (CARAFATE) 1 g, 4 times daily before meals and nightly    topiramate (TOPAMAX) 100 mg, oral, 2 times daily    Trulicity 3 mg, subcutaneous, Weekly    umeclidinium-vilanteroL (Anoro Ellipta) 62.5-25 mcg/actuation blister with device 1 puff, Daily    valACYclovir (VALTREX) 500 mg, Daily           REVIEW OF SYSTEMS  Review of Systems   Neurological:  Positive for dizziness.   All other systems reviewed and are negative.        VITALS  There were no vitals filed for this visit.    PHYSICAL EXAM  Vitals and nursing note reviewed.   Constitutional:       Appearance: Healthy appearance.   Eyes:      Conjunctiva/sclera: Conjunctivae normal.      Pupils: Pupils are equal, round, and reactive to light.   Pulmonary:      Effort: Pulmonary effort is normal.      Breath sounds: Normal breath sounds.   Cardiovascular:      PMI at left midclavicular line. Normal rate. Regular rhythm.      Murmurs: There is no murmur.      No gallop.  No click. No rub.   Pulses:     Decreased pulses.      Comments: Decreased right radial pulse  Edema:     Peripheral edema absent.   Musculoskeletal: Normal range of motion. Skin:     General: Skin is warm and dry.   Neurological:      Mental Status: Alert and oriented to person, place and time.           ASSESSMENT AND PLAN  Diagnoses and all orders for this visit:  Coronary artery disease involving native coronary artery of native heart, unspecified whether angina present  NSVT (nonsustained ventricular tachycardia) (Multi)  Status post placement of implantable loop recorder  Atherosclerosis of native artery of both lower extremities with intermittent claudication  Arnold-Chiari syndrome without spina bifida or hydrocephalus (Multi)  HTN (hypertension), benign  Hyperlipidemia LDL goal <100  Diabetes mellitus type II, non insulin dependent (Multi)  PEGGY on CPAP  Current every day smoker  Asthma-chronic obstructive pulmonary disease overlap syndrome (Multi)  Bipolar 2 disorder, major depressive episode (Multi)  Anxiety  Drug abuse  Chest pain, unspecified type      [unfilled]    I,Dunia Olivas LPN am scribing for, and in the presence of Dr. Alfredito Hudson.    I, Dr. Alfredito Hudson, personally performed the services described in the documentation as  scribed by Dunia Olivas LPN in my presence, and confirm it is both accurate and complete.    Dr. Alfredito Tobias MD  Thank you for allowing me to participate in the care of this patient. Please do not hesitate to contact me with any further questions or concerns.

## 2025-04-21 ENCOUNTER — HOSPITAL ENCOUNTER (EMERGENCY)
Age: 50
Discharge: HOME | End: 2025-04-21
Payer: COMMERCIAL

## 2025-04-21 VITALS
OXYGEN SATURATION: 96 % | TEMPERATURE: 97.9 F | DIASTOLIC BLOOD PRESSURE: 79 MMHG | HEART RATE: 71 BPM | SYSTOLIC BLOOD PRESSURE: 143 MMHG

## 2025-04-21 VITALS — BODY MASS INDEX: 37.8 KG/M2

## 2025-04-21 DIAGNOSIS — Z95.0: ICD-10-CM

## 2025-04-21 DIAGNOSIS — K62.5: Primary | ICD-10-CM

## 2025-04-21 DIAGNOSIS — Z86.0100: ICD-10-CM

## 2025-04-21 LAB
ADD MANUAL DIFF: NO
ALANINE AMINOTRANSFERASE: 32 U/L (ref 14–59)
ALBUMIN GLOBULIN RATIO: 0.9
ALBUMIN LEVEL: 3.2 G/DL (ref 3.4–5)
ALKALINE PHOSPHATASE: 51 U/L (ref 46–116)
AMYLASE: 34 U/L (ref 25–115)
ANION GAP: 14.6
ASPARTATE AMINO TRANSFERASE: 18 U/L (ref 15–37)
CALCIUM: 8.5 MG/DL (ref 8.5–10.1)
CHLORIDE: 104 MMOL/L (ref 98–107)
CO2 BLD-SCNC: 19.9 MMOL/L (ref 21–32)
ESTIMATED GFR (AFRICAN AMERICA: >60
ESTIMATED GFR (NON-AFRICAN AME: 56
GLOBULIN: 3.6 G/DL
GLUCOSE SERPLBLD-MCNC: 166 MG/DL (ref 74–106)
HCT VFR BLD CALC: 50.8 % (ref 36–48)
IMMATURE GRANULOCYTES ABS AUTO: 0.04 10^3/UL (ref 0–0.03)
IMMATURE GRANULOCYTES PCT AUTO: 0.4 % (ref 0–0.5)
LYMPHOCYTES  ABSOLUTE AUTO: 3.4 10^3/UL (ref 1.2–3.8)
MCH RBC QN AUTO: 33.6 PG (ref 26.7–34)
MCV RBC: 100.4 FL (ref 81–99)
MEAN CORPUSCULAR HGB CONC: 33.5 G/DL (ref 29.9–35.2)
PLATELET # BLD: 193 10^3/UL (ref 150–450)
POTASSIUM SERPLBLD-SCNC: 4.5 MMOL/L (ref 3.5–5.1)
RBC # BLD AUTO: 5.06 10^6/UL (ref 4.2–5.4)
SODIUM BLD-SCNC: 134 MMOL/L (ref 136–145)
TOTAL PROTEIN: 6.8 G/DL (ref 6.4–8.2)
WBC # BLD: 9.3 10^3/UL (ref 4–11)

## 2025-04-21 PROCEDURE — 99284 EMERGENCY DEPT VISIT MOD MDM: CPT

## 2025-04-21 PROCEDURE — 74176 CT ABD & PELVIS W/O CONTRAST: CPT

## 2025-04-21 PROCEDURE — 80048 BASIC METABOLIC PNL TOTAL CA: CPT

## 2025-04-21 PROCEDURE — 82150 ASSAY OF AMYLASE: CPT

## 2025-04-21 PROCEDURE — 36415 COLL VENOUS BLD VENIPUNCTURE: CPT

## 2025-04-21 PROCEDURE — 80076 HEPATIC FUNCTION PANEL: CPT

## 2025-04-21 PROCEDURE — 83690 ASSAY OF LIPASE: CPT

## 2025-04-21 PROCEDURE — 85025 COMPLETE CBC W/AUTO DIFF WBC: CPT

## 2025-04-23 ENCOUNTER — APPOINTMENT (OUTPATIENT)
Dept: CARDIOLOGY | Facility: CLINIC | Age: 50
End: 2025-04-23
Payer: COMMERCIAL

## 2025-04-23 ENCOUNTER — HOSPITAL ENCOUNTER (OUTPATIENT)
Dept: CARDIOLOGY | Facility: HOSPITAL | Age: 50
Discharge: HOME | End: 2025-04-23
Payer: COMMERCIAL

## 2025-04-23 VITALS
HEART RATE: 60 BPM | HEIGHT: 64 IN | WEIGHT: 220 LBS | BODY MASS INDEX: 37.56 KG/M2 | SYSTOLIC BLOOD PRESSURE: 134 MMHG | DIASTOLIC BLOOD PRESSURE: 66 MMHG

## 2025-04-23 DIAGNOSIS — E78.00 HIGH CHOLESTEROL: Chronic | ICD-10-CM

## 2025-04-23 DIAGNOSIS — F17.200 CURRENT EVERY DAY SMOKER: ICD-10-CM

## 2025-04-23 DIAGNOSIS — Z95.0 CARDIAC PACEMAKER IN SITU: ICD-10-CM

## 2025-04-23 DIAGNOSIS — I25.10 CORONARY ARTERY DISEASE INVOLVING NATIVE CORONARY ARTERY OF NATIVE HEART, UNSPECIFIED WHETHER ANGINA PRESENT: Primary | ICD-10-CM

## 2025-04-23 DIAGNOSIS — I47.29 NSVT (NONSUSTAINED VENTRICULAR TACHYCARDIA) (MULTI): ICD-10-CM

## 2025-04-23 DIAGNOSIS — I49.5 SINUS NODE DYSFUNCTION (MULTI): ICD-10-CM

## 2025-04-23 PROCEDURE — 3078F DIAST BP <80 MM HG: CPT | Performed by: INTERNAL MEDICINE

## 2025-04-23 PROCEDURE — 3008F BODY MASS INDEX DOCD: CPT | Performed by: INTERNAL MEDICINE

## 2025-04-23 PROCEDURE — 3051F HG A1C>EQUAL 7.0%<8.0%: CPT | Performed by: INTERNAL MEDICINE

## 2025-04-23 PROCEDURE — 3075F SYST BP GE 130 - 139MM HG: CPT | Performed by: INTERNAL MEDICINE

## 2025-04-23 PROCEDURE — 99214 OFFICE O/P EST MOD 30 MIN: CPT | Performed by: INTERNAL MEDICINE

## 2025-04-23 PROCEDURE — 93280 PM DEVICE PROGR EVAL DUAL: CPT | Performed by: INTERNAL MEDICINE

## 2025-04-23 PROCEDURE — 93280 PM DEVICE PROGR EVAL DUAL: CPT

## 2025-04-23 RX ORDER — ASPIRIN 81 MG/1
81 TABLET ORAL DAILY
COMMUNITY

## 2025-04-23 RX ORDER — GABAPENTIN 600 MG/1
2 TABLET ORAL
COMMUNITY
Start: 2025-04-02

## 2025-04-23 RX ORDER — LUBIPROSTONE 24 UG/1
24 CAPSULE ORAL 2 TIMES DAILY PRN
COMMUNITY

## 2025-04-23 ASSESSMENT — ENCOUNTER SYMPTOMS
ORTHOPNEA: 0
SHORTNESS OF BREATH: 0
SYNCOPE: 0
WHEEZING: 0
PND: 0
CLAUDICATION: 0
SNORING: 0
COUGH: 0
IRREGULAR HEARTBEAT: 0
DYSPNEA ON EXERTION: 1
NEAR-SYNCOPE: 0

## 2025-04-23 NOTE — PROGRESS NOTES
CARDIOLOGY OFFICE VISIT      CHIEF COMPLAINT  Chief Complaint   Patient presents with    Follow-up     6 month with device check       HISTORY OF PRESENT ILLNESS  HPI  49-year-old female with a past medical history of cystic compulsive disorder and also history of Chiari malformation with brain surgery done approximately a year ago. Patient has been followed by gynecology service since August last year after patient was admitted to outside hospital. 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 Then she got some radiation into the right arm. Then she started noticing right arm and right lower extremity weakness and she went home with those symptoms. At home her blood pressure systolic was found to be in the 80s with heart rates in the 40s. She came to emergency department for an evaluation. During telemetry patient states that her heart rate was always bradycardic. She was referred for cardiology for an evaluation. She had an a stress test and echocardiogram and also a Holter monitor that were unremarkable.     Echocardiogram in February 2023 shows left ventricular ejection fraction of 60 to 65% with no significant valvular normalities. Holter monitor in December 2022 shows underlying rhythm of sinus rhythm with minimum heart rate 45 bpm maximal heart rate of 129 beats per hours heart of 64 bpm. There were brief episodes of nonsustained supraventricular tachycardia up to 4 beats of duration. Asymptomatic. No evidence of heart block or atrial fibrillation. Had a stress test in February 2023 shows no evidence of ischemia with a left ventricular ejection fraction of 69%.     Due to persisting of symptoms of palpitations chest discomfort and sometimes diaphoresis an event monitor was ordered in May 2023. Event monitor shows underlying rhythm was sinus rhythm. There were 23 triggered events with symptoms that they were not specified. 1  of these episodes were related with wide-complex tachycardia total of 9 beats that occurred on April 17, 2023 at 1 in the morning. rate of 173 bpm.     Based on the results of the event monitor a cardiac MRI was done that shows left ventricular ejection fraction of 62% with delayed enhancement normal. No significant valvular normalities with mild asymmetric left ventricular hypertrophy.        Patient with implantation of a loop recorder in February 2, 2024 with no complications. Loop recorder interrogation has been showing episodes of sinus bradycardia with pauses up to 5 seconds of duration multiple times for the last 2 months. These episodes happen between afternoon-early evening.      Patient underwent implantation of a dual-chamber pacemaker in June 2024 with no complications.  Loop recorder was removed.    Since the last office visit she has been doing well.  She denies any symptoms of chest pain or shortness breath or palpitations.  Patient had a pending stress test ordered by primary cardiology service due to chest discomfort and retrosternal area with radiation to the back.    Device interrogation today at the device clinic shows dual-chamber pacemaker Medtronic with battery Ingevity 13 years 8 months.  No evidence of atrial or ventricular events noted.      Past Medical History  Medical History[1]    Social History  Social History[2]    Family History   Family History[3]     Allergies:  RX Allergies[4]     Outpatient Medications:  Current Outpatient Medications   Medication Instructions    albuterol (Ventolin HFA) 90 mcg/actuation inhaler 2 puffs, Every 6 hours PRN    aspirin 81 mg, Daily    atorvastatin (LIPITOR) 80 mg, oral, Nightly    blood sugar diagnostic (OneTouch Verio test strips) strip Test blood sugars 4 times a day as directed    brivaracetam (BRIVIACT) 200 mg, oral, 2 times daily    cholecalciferol (VITAMIN D-3) 2,000 Units, Daily    clotrimazole (Lotrimin) 1 % cream 1 Application, As needed     diclofenac sodium (Voltaren) 1 % gel gel APPLY 4 INCHES OF MEDICATION TO EACH KNEE EVERY 4 HOURS AS NEEDED    ergocalciferol (VITAMIN D-2) 50 mcg, oral, Daily    ezetimibe (ZETIA) 10 mg, oral, Daily    fenofibrate (TRICOR) 145 mg, Daily    gabapentin (Neurontin) 600 mg tablet 2 tablets, 3 times daily (0900,1400,1900)    ibuprofen 800 mg, oral, Every 8 hours PRN    lamoTRIgine (LAMICTAL) 200 mg, Daily    lancets (OneTouch Delica Plus Lancet) 30 gauge misc Check BG    lubiprostone (AMITIZA) 24 mcg, 2 times daily PRN    metoprolol tartrate (LOPRESSOR) 25 mg, oral, 2 times daily    montelukast (SINGULAIR) 10 mg, Nightly    nitroglycerin (NITROSTAT) 0.4 mg, sublingual, Every 5 min PRN, May repeat dose every 5 minutes for up to 3 doses total.    ondansetron (ZOFRAN) 8 mg, Every 8 hours PRN    pantoprazole (ProtoNix) 40 mg EC tablet 1 tablet, 2 times daily    sucralfate (CARAFATE) 1 g, 4 times daily before meals and nightly    topiramate (TOPAMAX) 100 mg, oral, 2 times daily    Trulicity 3 mg, subcutaneous, Weekly    umeclidinium-vilanteroL (Anoro Ellipta) 62.5-25 mcg/actuation blister with device 1 puff, Daily    valACYclovir (VALTREX) 500 mg, Daily          REVIEW OF SYSTEMS  Review of Systems   Constitutional: Negative for malaise/fatigue.   Cardiovascular:  Positive for chest pain and dyspnea on exertion. Negative for claudication, cyanosis, irregular heartbeat, leg swelling, near-syncope, orthopnea, paroxysmal nocturnal dyspnea and syncope.   Respiratory:  Negative for cough, shortness of breath, snoring and wheezing.    All other systems reviewed and are negative.        VITALS  Vitals:    04/23/25 1138   BP: 134/66   Pulse: 60       PHYSICAL EXAM  Constitutional:       Appearance: Normal and healthy appearance. Well-developed and not in distress. Obese.      Comments: Left sided device well healed   Neck:      Vascular: No JVR. JVD normal.   Pulmonary:      Effort: Pulmonary effort is normal.      Breath sounds:  Normal breath sounds. No wheezing. No rhonchi. No rales.   Chest:      Chest wall: Not tender to palpatation.   Cardiovascular:      PMI at left midclavicular line. Normal rate. Regular rhythm. Normal S1. Normal S2.       Murmurs: There is no murmur.      No gallop.  No click. No rub.   Pulses:     Intact distal pulses.   Edema:     Peripheral edema absent.   Abdominal:      Tenderness: There is no abdominal tenderness.   Musculoskeletal: Normal range of motion.         General: No tenderness. Skin:     General: Skin is warm and dry.   Neurological:      General: No focal deficit present.      Mental Status: Alert and oriented to person, place and time.           ASSESSMENT AND PLAN      Clinical impression     1. Evidence of bradycardia during admission in outside hospital.  2. Palpitations  3. Evidence of nonsustained ventricular tachycardia on event monitor  4. Normal left ventricular function per echocardiogram in 2023 and cardiac MRI in 2023  5. No evidence of ischemia per stress test in 2023  6. 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 Obsessive-compulsive disorder  8.  Status post loop recorder implantation in February 2024 with no complications  9.  Evidence of pauses that may be related with near syncope.  This was discussed with patient and family members during this office visit.  Status post loop recorder removal and implantation of a dual-chamber pacemaker in June 12, 2024 with no complications      Plan recommendation    From the electrophysiologist on point she is doing well.  No recurrence of atrial or ventricular arrhythmias.    Continue beta-blocker therapy.    Follow my office every 6 months or sooner if needed.    Patient should continue with the stress test ordered by cardiology service.    Risk factor modification and lifestyle modification discussed with patient. Diet , exercise and hydration discussed with patient.    I have personally review with patient during this office  visit, laboratory data, echocardiogram results, stress test results, Holter-event monitor results prior and after the last electrophysiology visit. All questions has been answered.    Please excuse any errors in grammar or translation related to this dictation.  Voice recognition software was utilized to prepare this document.    IJENAE LPN, AM SCRIBING FOR, AND IN THE PRESENCE OF DR. JEFF JAMESON MD    I, Dr. Jameson, personally performed the services described in the documentation as scribed by the nurse in my presence, and confirm it is both accurate and complete.            [1]   Past Medical History:  Diagnosis Date    Arrhythmia     Asthma     Current smoker     Diabetes mellitus (Multi)     Disease of thyroid gland     History of Chiari malformation     Hypercortisolism (Multi) 09/21/2023    Hyperlipidemia     Hypertension     OCD (obsessive compulsive disorder)     Other disorders of lung     Lung trouble    Personal history of other diseases of the digestive system     History of gastroesophageal reflux (GERD)    Personal history of other diseases of the musculoskeletal system and connective tissue     History of arthritis    Personal history of other diseases of the nervous system and sense organs     History of sleep apnea    Personal history of other diseases of the respiratory system     History of chronic obstructive lung disease    Personal history of other diseases of the respiratory system     History of bronchitis    Personal history of other diseases of the respiratory system     History of asthma    Personal history of other endocrine, nutritional and metabolic disease     History of diabetes mellitus    Personal history of other endocrine, nutritional and metabolic disease     History of thyroid disorder    Personal history of other mental and behavioral disorders     History of depression    Personal history of other mental and behavioral disorders     History of mental disorder     Personal history of other specified conditions     History of snoring   [2]   Social History  Tobacco Use    Smoking status: Every Day     Current packs/day: 0.50     Types: Cigarettes    Smokeless tobacco: Never   Vaping Use    Vaping status: Never Used   Substance Use Topics    Alcohol use: Not Currently     Comment: quit 2008    Drug use: Yes     Types: Marijuana     Comment: 3 times per week   [3]   Family History  Problem Relation Name Age of Onset    Hypertension Mother      Arthritis Mother      No Known Problems Father     [4]   Allergies  Allergen Reactions    Fish Containing Products Anaphylaxis    Iodinated Contrast Media Anaphylaxis    Duloxetine Unknown    Pregabalin Unknown

## 2025-04-23 NOTE — PATIENT INSTRUCTIONS
Follow up in 6 months with Faviola, with an in- clinic device check same day  Remote device checks at 3 and 9 months  Continue same medications and treatments.   Patient educated on proper medication use.   Patient educated on risk factor modification.   Please bring any lab results from other providers / physicians to your next appointment.     Please bring all medicines, vitamins, and herbal supplements with you when you come to the office.     Prescriptions will not be filled unless you are compliant with your follow up appointments or have a follow up appointment scheduled as per instruction of your physician. Refills should be requested at the time of your visit.

## 2025-04-24 ENCOUNTER — APPOINTMENT (OUTPATIENT)
Dept: RADIOLOGY | Facility: CLINIC | Age: 50
End: 2025-04-24
Payer: COMMERCIAL

## 2025-04-24 ENCOUNTER — APPOINTMENT (OUTPATIENT)
Dept: CARDIOLOGY | Facility: CLINIC | Age: 50
End: 2025-04-24
Payer: COMMERCIAL

## 2025-05-07 ENCOUNTER — APPOINTMENT (OUTPATIENT)
Dept: CARDIOLOGY | Facility: CLINIC | Age: 50
End: 2025-05-07
Payer: COMMERCIAL

## 2025-05-13 ENCOUNTER — APPOINTMENT (OUTPATIENT)
Dept: RADIOLOGY | Facility: CLINIC | Age: 50
End: 2025-05-13
Payer: COMMERCIAL

## 2025-05-13 ENCOUNTER — APPOINTMENT (OUTPATIENT)
Dept: CARDIOLOGY | Facility: CLINIC | Age: 50
End: 2025-05-13
Payer: COMMERCIAL

## 2025-05-14 ENCOUNTER — TELEPHONE (OUTPATIENT)
Dept: NEUROLOGY | Facility: CLINIC | Age: 50
End: 2025-05-14
Payer: COMMERCIAL

## 2025-05-14 NOTE — TELEPHONE ENCOUNTER
Dr.Todd Hernandez sent a referral for the pt to be seen sooner by you. I called the pt, she has recently been in the hospital and is having worsening symptoms of the Arnold-Chiari syndrome, stiff/painful neck, with numbness and tingling in both arms extending down passed her elbows. Still having minor seizures in her sleep once every couple of weeks. Asking if she should be seen sooner or what your recommendations are at this time.

## 2025-05-15 NOTE — TELEPHONE ENCOUNTER
Pt asking if you could call her again.  Stated  wanted you to order a full spinal MRI due to the pain she is in.

## 2025-05-21 ENCOUNTER — APPOINTMENT (OUTPATIENT)
Dept: CARDIOLOGY | Facility: CLINIC | Age: 50
End: 2025-05-21
Payer: COMMERCIAL

## 2025-05-21 VITALS
DIASTOLIC BLOOD PRESSURE: 60 MMHG | HEIGHT: 64 IN | WEIGHT: 221.3 LBS | HEART RATE: 64 BPM | SYSTOLIC BLOOD PRESSURE: 132 MMHG | BODY MASS INDEX: 37.78 KG/M2

## 2025-05-21 DIAGNOSIS — I73.9 PAD (PERIPHERAL ARTERY DISEASE): ICD-10-CM

## 2025-05-21 DIAGNOSIS — J44.89 COPD WITH ASTHMA (MULTI): ICD-10-CM

## 2025-05-21 DIAGNOSIS — I70.8 RIGHT SUBCLAVIAN ARTERY OCCLUSION: ICD-10-CM

## 2025-05-21 DIAGNOSIS — I74.09 AORTOILIAC OCCLUSIVE DISEASE (MULTI): ICD-10-CM

## 2025-05-21 DIAGNOSIS — I65.23 OCCLUSION AND STENOSIS OF BILATERAL CAROTID ARTERIES: ICD-10-CM

## 2025-05-21 DIAGNOSIS — I70.213 ATHEROSCLEROSIS OF NATIVE ARTERY OF BOTH LOWER EXTREMITIES WITH INTERMITTENT CLAUDICATION: ICD-10-CM

## 2025-05-21 DIAGNOSIS — F31.81 BIPOLAR 2 DISORDER, MAJOR DEPRESSIVE EPISODE (MULTI): Chronic | ICD-10-CM

## 2025-05-21 DIAGNOSIS — E11.59 TYPE 2 DIABETES MELLITUS WITH OTHER CIRCULATORY COMPLICATION, WITHOUT LONG-TERM CURRENT USE OF INSULIN: ICD-10-CM

## 2025-05-21 DIAGNOSIS — E78.5 HYPERLIPIDEMIA LDL GOAL <100: ICD-10-CM

## 2025-05-21 DIAGNOSIS — F17.200 CURRENT EVERY DAY SMOKER: ICD-10-CM

## 2025-05-21 DIAGNOSIS — I47.29 NSVT (NONSUSTAINED VENTRICULAR TACHYCARDIA) (MULTI): ICD-10-CM

## 2025-05-21 DIAGNOSIS — G47.33 OSA ON CPAP: ICD-10-CM

## 2025-05-21 DIAGNOSIS — I10 HTN (HYPERTENSION), BENIGN: ICD-10-CM

## 2025-05-21 DIAGNOSIS — I25.10 CORONARY ARTERY DISEASE INVOLVING NATIVE CORONARY ARTERY OF NATIVE HEART, UNSPECIFIED WHETHER ANGINA PRESENT: ICD-10-CM

## 2025-05-21 DIAGNOSIS — Q07.00 ARNOLD-CHIARI SYNDROME WITHOUT SPINA BIFIDA OR HYDROCEPHALUS (MULTI): ICD-10-CM

## 2025-05-21 DIAGNOSIS — G40.909 SEIZURE DISORDER (MULTI): ICD-10-CM

## 2025-05-21 PROCEDURE — 99214 OFFICE O/P EST MOD 30 MIN: CPT | Performed by: INTERNAL MEDICINE

## 2025-05-21 PROCEDURE — 3008F BODY MASS INDEX DOCD: CPT | Performed by: INTERNAL MEDICINE

## 2025-05-21 PROCEDURE — 3075F SYST BP GE 130 - 139MM HG: CPT | Performed by: INTERNAL MEDICINE

## 2025-05-21 PROCEDURE — 3051F HG A1C>EQUAL 7.0%<8.0%: CPT | Performed by: INTERNAL MEDICINE

## 2025-05-21 PROCEDURE — 3078F DIAST BP <80 MM HG: CPT | Performed by: INTERNAL MEDICINE

## 2025-05-21 NOTE — PROGRESS NOTES
CARDIOLOGY OFFICE VISIT      CHIEF COMPLAINT  Chief Complaint   Patient presents with    Follow-up     Follow up on coronary artery disease and chest pain    Hospital Follow-up     [facility] Emergency department for chest pain and bilateral arm numbness        HISTORY OF PRESENT ILLNESS  The patient lives in [place] and she states that she has been having to go to [facility] downtown to see vascular medicine and she would like it if we can follow it here to save her trip downtown.  I told her we can do that.  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  She also has possible stenosis of her right subclavian artery.  She also does have moderate carotid artery disease by previous carotid ultrasound.  She is seeing neurology and they want to know if she can have an MRI.  She did have permanent pacemaker put in last year by Dr. LONG which is MRI compatible.  She denies any ischemic chest discomfort.  She denies dyspnea.  She denies palpitations and syncope.  She denies symptoms of transient ischemic neurologic attacks.    Impression:  Coronary Artery Disease, mild to moderate by CCTA 3/2024, no angina  Permanent pacemaker for sinus node dysfunction, follows with Dr. Monson  Normal left ventricular systolic function by echocardiogram 2023  No evidence of myocardial ischemia per stress test 2023   Carotid artery disease, moderate, bilaterally by carotid ultrasound, asymptomatic  Obesity  History of bronchial asthma  PAD /100% occlusion of abdominal aorta at level of inferior mesenteric artery  History of possible right subclavian artery stenosis  Seizure disorder  Arnold-Chiari Syndrome  Cushing Syndrome   Hypertension  Hyperlipidemia   Diabetes Mellitus, Type 2.  JOSE- Dr. Lechuga   Bipolar/ Anxiety / Drug Abuse   COPD/Asthma   Obstructive Sleep Apnea on PAP therapy   Current Smoker Daily  History of Raynaud's        Please excuse any errors in grammar or  translation related to this dictation.  Voice recognition software was utilized to prepare this document.         Past Medical History  Medical History[1]    Social History  Social History[2]    Family History   Family History[3]     Allergies:  RX Allergies[4]     Outpatient Medications:  Current Outpatient Medications   Medication Instructions    albuterol (Ventolin HFA) 90 mcg/actuation inhaler 2 puffs, Every 6 hours PRN    aspirin 81 mg, Daily    atorvastatin (LIPITOR) 80 mg, oral, Nightly    blood sugar diagnostic (OneTouch Verio test strips) strip Test blood sugars 4 times a day as directed    brivaracetam (BRIVIACT) 200 mg, oral, 2 times daily    cholecalciferol (VITAMIN D-3) 2,000 Units, Daily    clotrimazole (Lotrimin) 1 % cream 1 Application, As needed    diclofenac sodium (Voltaren) 1 % gel gel APPLY 4 INCHES OF MEDICATION TO EACH KNEE EVERY 4 HOURS AS NEEDED    ezetimibe (ZETIA) 10 mg, oral, Daily    fenofibrate (TRICOR) 145 mg, Daily    gabapentin (Neurontin) 600 mg tablet 2 tablets, 3 times daily (0900,1400,1900)    ibuprofen 800 mg, oral, Every 8 hours PRN    lamoTRIgine (LAMICTAL) 200 mg, Daily    lancets (OneTouch Delica Plus Lancet) 30 gauge misc Check BG    lubiprostone (AMITIZA) 24 mcg, 2 times daily PRN    metoprolol tartrate (LOPRESSOR) 25 mg, oral, 2 times daily    montelukast (SINGULAIR) 10 mg, Nightly    nitroglycerin (NITROSTAT) 0.4 mg, sublingual, Every 5 min PRN, May repeat dose every 5 minutes for up to 3 doses total.    ondansetron (ZOFRAN) 8 mg, Every 8 hours PRN    pantoprazole (ProtoNix) 40 mg EC tablet 1 tablet, 2 times daily    sucralfate (CARAFATE) 1 g, 4 times daily before meals and nightly    topiramate (TOPAMAX) 100 mg, oral, 2 times daily    Trulicity 3 mg, subcutaneous, Weekly    umeclidinium-vilanteroL (Anoro Ellipta) 62.5-25 mcg/actuation blister with device 1 puff, Daily    valACYclovir (VALTREX) 500 mg, Daily          REVIEW OF SYSTEMS  Review of Systems   All other  systems reviewed and are negative.        VITALS  Vitals:    05/21/25 1344   BP: 132/60   Pulse: 64       PHYSICAL EXAM  Vitals and nursing note reviewed.   Constitutional:       Appearance: Healthy appearance.   Eyes:      Conjunctiva/sclera: Conjunctivae normal.      Pupils: Pupils are equal, round, and reactive to light.   Pulmonary:      Effort: Pulmonary effort is normal.      Breath sounds: Normal breath sounds.   Cardiovascular:      PMI at left midclavicular line. Normal rate. Regular rhythm.      Murmurs: There is no murmur.      No gallop.  No click. No rub.   Pulses:     Intact distal pulses.      Comments: Decreased bilateral lower extremity pulses.  Edema:     Peripheral edema absent.   Musculoskeletal: Normal range of motion. Skin:     General: Skin is warm and dry.   Neurological:      Mental Status: Alert and oriented to person, place and time.           ASSESSMENT AND PLAN  Diagnoses and all orders for this visit:  Coronary artery disease involving native coronary artery of native heart, unspecified whether angina present  NSVT (nonsustained ventricular tachycardia) (Multi)  COPD with asthma (Multi)  Atherosclerosis of native artery of both lower extremities with intermittent claudication  Right subclavian artery occlusion  Seizure disorder (Multi)  Arnold-Chiari syndrome without spina bifida or hydrocephalus (Multi)  HTN (hypertension), benign  Hyperlipidemia LDL goal <100  Type 2 diabetes mellitus with other circulatory complication, without long-term current use of insulin  Bipolar 2 disorder, major depressive episode (Multi)  PEGGY on CPAP  Current every day smoker  Aortoiliac occlusive disease (Multi)  Occlusion and stenosis of bilateral carotid arteries  PAD (peripheral artery disease)      [unfilled]      IDunia LPN am scribing for, and in the presence of Dr. Alfredito Hudson.    I, Dr. Alfredito Hudson, personally performed the services described in the  documentation as scribed by Dunia Olivas LPN in my presence, and confirm it is both accurate and complete.      Dr. Alfredito Nicholas MD  Thank you for allowing me to participate in the care of this patient. Please do not hesitate to contact me with any further questions or concerns.         [1]   Past Medical History:  Diagnosis Date    Arrhythmia     Asthma     Current smoker     Diabetes mellitus (Multi)     Disease of thyroid gland     History of Chiari malformation     Hypercortisolism (Multi) 09/21/2023    Hyperlipidemia     Hypertension     OCD (obsessive compulsive disorder)     Other disorders of lung     Lung trouble    Personal history of other diseases of the digestive system     History of gastroesophageal reflux (GERD)    Personal history of other diseases of the musculoskeletal system and connective tissue     History of arthritis    Personal history of other diseases of the nervous system and sense organs     History of sleep apnea    Personal history of other diseases of the respiratory system     History of chronic obstructive lung disease    Personal history of other diseases of the respiratory system     History of bronchitis    Personal history of other diseases of the respiratory system     History of asthma    Personal history of other endocrine, nutritional and metabolic disease     History of diabetes mellitus    Personal history of other endocrine, nutritional and metabolic disease     History of thyroid disorder    Personal history of other mental and behavioral disorders     History of depression    Personal history of other mental and behavioral disorders     History of mental disorder    Personal history of other specified conditions     History of snoring   [2]   Social History  Tobacco Use    Smoking status: Every Day     Current packs/day: 0.50     Types: Cigarettes    Smokeless tobacco: Never   Vaping Use    Vaping status: Never Used   Substance Use Topics    Alcohol use: Not  Currently     Comment: quit 2008    Drug use: Yes     Types: Marijuana     Comment: 3 times per week   [3]   Family History  Problem Relation Name Age of Onset    Hypertension Mother      Arthritis Mother      No Known Problems Father     [4]   Allergies  Allergen Reactions    Fish Containing Products Anaphylaxis    Iodinated Contrast Media Anaphylaxis    Duloxetine Unknown    Pregabalin Unknown

## 2025-05-21 NOTE — PATIENT INSTRUCTIONS
You are being scheduled for a CT angio of the chest, abdomen and pelvis and also a carotid duplex  You are being referred to Dr. Matthew for your peripheral disease  Will call patient with test results once reviewed by physician  Follow up office visit in 1 year.  Continue same medications/treatment.  Patient educated on proper medication use.  Patient educated on risk factor modification.  Please bring any lab results from other providers / physicians to your next appointment.    Please bring all medicines, vitamins and herbal supplements with you when you come to the office.    Prescriptions will not be filled unless you are compliant with your follow up appointments or have a follow up  appointment scheduled as per instruction of your physician.  Refills should be requested at the time of  Your visit.

## 2025-05-22 ENCOUNTER — TELEPHONE (OUTPATIENT)
Dept: NEUROLOGY | Facility: CLINIC | Age: 50
End: 2025-05-22
Payer: COMMERCIAL

## 2025-05-22 DIAGNOSIS — R56.9 SEIZURES (MULTI): Primary | ICD-10-CM

## 2025-05-22 NOTE — TELEPHONE ENCOUNTER
Pt called office stating you said that you were ordering a MRI at last visit but when she called to scheduled they stated there was no order on file. Follow up scheduled for 9/3/25.

## 2025-05-23 LAB
NON-UH HIE BLOOD UREA NITROGEN: 22 MG/DL (ref 7–25)
NON-UH HIE CREATININE: 0.87 MG/DL (ref 0.6–1.2)
NON-UH HIE ESTIMATED GFR: >60 ML/MIN

## 2025-05-26 NOTE — PROGRESS NOTES
49 y.o. female and on 1- had a Chiari decompression. Presents with intermittent left right arm pain starting in the face upper neck to the arm to forearm. She notes the pain is changes laterally and never bilateral pain. Pt presented to [facility] ED with referral to Ortho Dr. Finnegan at [facility] with referral to Neurosurgery. Pt notes some decreased range of motion. Pain is affecting her sleep.     50 yo F who previously underwent Chiari decompression presents because of pain in her neck running down her arms.  The pain is mostly in the right arm but sometimes migrates to the left.  She states is goes all the way into her hands and she gets tinging in her thumb, index finger, and middle finger.  She has a history of carpal tunnel release.      On exam, she is alert and interactive.  Her incision is well-healed.  Strength is full.  There is no evidence of hypertonia or hyperreflexia.  A Phalen sign is present in both wrists.      Xrays of the cervical spine that I personally reviewed demonstrate mild degenerative disease with normal alignment.    The patient has bilateral arm pain with mild bony cervical spondylosis.  I am concerned about the possibility of a peripheral compressive neuropathy.  I would like to obtain bilateral upper extremity neuromuscular ultrasounds for further evaluation.

## 2025-05-27 ENCOUNTER — TELEPHONE (OUTPATIENT)
Dept: CARDIOLOGY | Facility: CLINIC | Age: 50
End: 2025-05-27
Payer: COMMERCIAL

## 2025-05-27 NOTE — TELEPHONE ENCOUNTER
Called patient regarding denial we received for CT Scan. Appears there was a request already approved for this through another physician. Attempted to call patient and notify. She may need to call that office to see if they will be able to cancel that auth. No answer left vm.

## 2025-05-28 ENCOUNTER — OFFICE VISIT (OUTPATIENT)
Dept: NEUROSURGERY | Facility: HOSPITAL | Age: 50
End: 2025-05-28
Payer: COMMERCIAL

## 2025-05-28 VITALS
HEART RATE: 67 BPM | RESPIRATION RATE: 17 BRPM | SYSTOLIC BLOOD PRESSURE: 96 MMHG | DIASTOLIC BLOOD PRESSURE: 65 MMHG | WEIGHT: 221 LBS | BODY MASS INDEX: 37.73 KG/M2 | HEIGHT: 64 IN

## 2025-05-28 DIAGNOSIS — G56.03 CARPAL TUNNEL SYNDROME, BILATERAL: ICD-10-CM

## 2025-05-28 PROCEDURE — 3078F DIAST BP <80 MM HG: CPT | Performed by: NEUROLOGICAL SURGERY

## 2025-05-28 PROCEDURE — 99212 OFFICE O/P EST SF 10 MIN: CPT | Performed by: NEUROLOGICAL SURGERY

## 2025-05-28 PROCEDURE — 3051F HG A1C>EQUAL 7.0%<8.0%: CPT | Performed by: NEUROLOGICAL SURGERY

## 2025-05-28 PROCEDURE — 3008F BODY MASS INDEX DOCD: CPT | Performed by: NEUROLOGICAL SURGERY

## 2025-05-28 PROCEDURE — 3074F SYST BP LT 130 MM HG: CPT | Performed by: NEUROLOGICAL SURGERY

## 2025-05-28 ASSESSMENT — COLUMBIA-SUICIDE SEVERITY RATING SCALE - C-SSRS
2. HAVE YOU ACTUALLY HAD ANY THOUGHTS OF KILLING YOURSELF?: NO
6. HAVE YOU EVER DONE ANYTHING, STARTED TO DO ANYTHING, OR PREPARED TO DO ANYTHING TO END YOUR LIFE?: NO
1. IN THE PAST MONTH, HAVE YOU WISHED YOU WERE DEAD OR WISHED YOU COULD GO TO SLEEP AND NOT WAKE UP?: NO

## 2025-05-28 ASSESSMENT — ENCOUNTER SYMPTOMS
DEPRESSION: 0
LOSS OF SENSATION IN FEET: 1
OCCASIONAL FEELINGS OF UNSTEADINESS: 1

## 2025-05-28 ASSESSMENT — PATIENT HEALTH QUESTIONNAIRE - PHQ9
1. LITTLE INTEREST OR PLEASURE IN DOING THINGS: NOT AT ALL
2. FEELING DOWN, DEPRESSED OR HOPELESS: NOT AT ALL
SUM OF ALL RESPONSES TO PHQ9 QUESTIONS 1 AND 2: 0

## 2025-05-28 ASSESSMENT — PAIN SCALES - GENERAL: PAINLEVEL_OUTOF10: 7

## 2025-05-30 ENCOUNTER — TELEPHONE (OUTPATIENT)
Dept: CARDIOLOGY | Facility: CLINIC | Age: 50
End: 2025-05-30
Payer: COMMERCIAL

## 2025-05-30 DIAGNOSIS — I74.09 AORTOILIAC OCCLUSIVE DISEASE (MULTI): ICD-10-CM

## 2025-05-30 RX ORDER — PREDNISONE 20 MG/1
TABLET ORAL
Qty: 6 TABLET | Refills: 0 | Status: SHIPPED | OUTPATIENT
Start: 2025-05-30

## 2025-05-30 NOTE — TELEPHONE ENCOUNTER
this patient has an anaphylactic reaction to iodine contrast noted 10/2024.  How would you like to proceed with the CTA Abdomen pelvis? Thank you  Thu 10:10 AM  You were added by Alfredito Hudson MD.  Thu 10:42 AM  Patricia Midkiff and Tommy Ocampo were added by Virginia Conti Till.  Thu 11:32 AM  ND  PM  CO  Alfredito Hudson MD left.  1 min  Dr. Hudson will prescribe, Prednisone 60 mg the evening prior and again the morning of CTA along with Benadryl 50 mg the morning of also.    Call placed to patient and advised.  Patient verbalized understanding.  Rx sent to [facility] in [place].

## 2025-06-04 ENCOUNTER — APPOINTMENT (OUTPATIENT)
Dept: RADIOLOGY | Facility: HOSPITAL | Age: 50
End: 2025-06-04
Payer: COMMERCIAL

## 2025-06-16 ENCOUNTER — APPOINTMENT (OUTPATIENT)
Dept: CARDIOLOGY | Facility: CLINIC | Age: 50
End: 2025-06-16
Payer: COMMERCIAL

## 2025-06-20 ENCOUNTER — TELEPHONE (OUTPATIENT)
Dept: CARDIOLOGY | Facility: CLINIC | Age: 50
End: 2025-06-20
Payer: COMMERCIAL

## 2025-06-20 ENCOUNTER — HOSPITAL ENCOUNTER (OUTPATIENT)
Dept: RADIOLOGY | Facility: HOSPITAL | Age: 50
Discharge: HOME | End: 2025-06-20
Payer: COMMERCIAL

## 2025-06-20 ENCOUNTER — HOSPITAL ENCOUNTER (OUTPATIENT)
Dept: CARDIOLOGY | Facility: HOSPITAL | Age: 50
Discharge: HOME | End: 2025-06-20
Payer: COMMERCIAL

## 2025-06-20 DIAGNOSIS — I25.10 CORONARY ARTERY DISEASE INVOLVING NATIVE CORONARY ARTERY OF NATIVE HEART, UNSPECIFIED WHETHER ANGINA PRESENT: ICD-10-CM

## 2025-06-20 DIAGNOSIS — R94.39 ABNORMAL NUCLEAR STRESS TEST: ICD-10-CM

## 2025-06-20 DIAGNOSIS — I65.23 OCCLUSION AND STENOSIS OF BILATERAL CAROTID ARTERIES: ICD-10-CM

## 2025-06-20 DIAGNOSIS — R07.9 CHEST PAIN, UNSPECIFIED TYPE: ICD-10-CM

## 2025-06-20 PROCEDURE — 3430000001 HC RX 343 DIAGNOSTIC RADIOPHARMACEUTICALS: Performed by: INTERNAL MEDICINE

## 2025-06-20 PROCEDURE — 78452 HT MUSCLE IMAGE SPECT MULT: CPT

## 2025-06-20 PROCEDURE — 93017 CV STRESS TEST TRACING ONLY: CPT

## 2025-06-20 PROCEDURE — A9502 TC99M TETROFOSMIN: HCPCS | Performed by: INTERNAL MEDICINE

## 2025-06-20 PROCEDURE — 93880 EXTRACRANIAL BILAT STUDY: CPT

## 2025-06-20 PROCEDURE — 2500000004 HC RX 250 GENERAL PHARMACY W/ HCPCS (ALT 636 FOR OP/ED): Performed by: INTERNAL MEDICINE

## 2025-06-20 RX ORDER — REGADENOSON 0.08 MG/ML
0.4 INJECTION, SOLUTION INTRAVENOUS ONCE
Status: COMPLETED | OUTPATIENT
Start: 2025-06-20 | End: 2025-06-20

## 2025-06-20 RX ADMIN — REGADENOSON 0.4 MG: 0.08 INJECTION, SOLUTION INTRAVENOUS at 11:29

## 2025-06-20 RX ADMIN — TETROFOSMIN 9.3 MILLICURIE: 0.23 INJECTION, POWDER, LYOPHILIZED, FOR SOLUTION INTRAVENOUS at 10:15

## 2025-06-20 RX ADMIN — TETROFOSMIN 31.2 MILLICURIE: 0.23 INJECTION, POWDER, LYOPHILIZED, FOR SOLUTION INTRAVENOUS at 11:29

## 2025-06-20 NOTE — TELEPHONE ENCOUNTER
Call placed to patient and advised.  Patient verbalized understanding.  Patient was advised to take Metoprolol tartrate 50 mg 2 hours prior to CTA.  Order placed and routed to Dr. Alfredito Hudson

## 2025-06-20 NOTE — TELEPHONE ENCOUNTER
----- Message from Alfredito Hudson sent at 6/20/2025  1:57 PM EDT -----  The patient's nuclear stress test reported show multiple areas of abnormality.  However there was significant diaphragmatic and breast attenuation artifact which may account for these.  She did have   CT coronary angiography in March 2024 which is reported to show mild to moderate disease.  I would like to repeat the CT coronary angiography.  I am not sure what her heart rate is.  She might need   beta-blocker dose to make sure it is low enough that we get good pictures.  Let me know  ----- Message -----  From: Interface, Radiology Results In  Sent: 6/20/2025   1:51 PM EDT  To: Alfredito Hudson MD

## 2025-06-23 ENCOUNTER — TELEPHONE (OUTPATIENT)
Dept: CARDIOLOGY | Facility: CLINIC | Age: 50
End: 2025-06-23
Payer: COMMERCIAL

## 2025-06-23 NOTE — TELEPHONE ENCOUNTER
----- Message from Nurse Dunia HUGGINS sent at 6/23/2025 11:30 AM EDT -----    ----- Message -----  From: Alfredito Hudson MD  Sent: 6/23/2025  11:06 AM EDT  To: Dunia Olivas LPN    Carotid artery ultrasound demonstrates no significant carotid artery disease  ----- Message -----  From: Interface, Radiology Results In  Sent: 6/22/2025   3:50 PM EDT  To: Alfredito Hudson MD

## 2025-06-25 ENCOUNTER — TELEPHONE (OUTPATIENT)
Dept: CARDIOLOGY | Facility: CLINIC | Age: 50
End: 2025-06-25
Payer: COMMERCIAL

## 2025-06-25 NOTE — TELEPHONE ENCOUNTER
Patient called and stated that she was scheduled for a cardiac CTA in [place] but she is allergic to the contrast dye so they told her that she had to have it done at main [place] because they do not have an ER. I transferred the patient to scheduling.

## 2025-06-26 ENCOUNTER — HOSPITAL ENCOUNTER (OUTPATIENT)
Dept: RADIOLOGY | Facility: HOSPITAL | Age: 50
Discharge: HOME | End: 2025-06-26
Payer: COMMERCIAL

## 2025-06-26 DIAGNOSIS — R94.39 ABNORMAL NUCLEAR STRESS TEST: ICD-10-CM

## 2025-06-26 DIAGNOSIS — R56.9 SEIZURES (MULTI): ICD-10-CM

## 2025-06-26 PROCEDURE — 70551 MRI BRAIN STEM W/O DYE: CPT

## 2025-06-26 NOTE — NURSING NOTE
Pt here for MRI, pt has a pacemaker, set to sure scan On , MRI completed, pt tolerated scan well, surescan turned off.

## 2025-06-26 NOTE — PROGRESS NOTES
Orders placed for BUN an Creatinine prior to Coronary CTA.  Call placed to patient and left voice mail message requesting return call to let her know to have blood work done at least 3 days prior to CTA.

## 2025-07-01 ENCOUNTER — APPOINTMENT (OUTPATIENT)
Dept: RADIOLOGY | Facility: HOSPITAL | Age: 50
End: 2025-07-01
Payer: COMMERCIAL

## 2025-07-03 ENCOUNTER — APPOINTMENT (OUTPATIENT)
Dept: CARDIOLOGY | Facility: CLINIC | Age: 50
End: 2025-07-03
Payer: COMMERCIAL

## 2025-07-08 ENCOUNTER — PROCEDURE VISIT (OUTPATIENT)
Dept: NEUROLOGY | Facility: HOSPITAL | Age: 50
End: 2025-07-08
Payer: COMMERCIAL

## 2025-07-08 DIAGNOSIS — G56.02 CARPAL TUNNEL SYNDROME, LEFT: Primary | ICD-10-CM

## 2025-07-08 DIAGNOSIS — G56.01 CARPAL TUNNEL SYNDROME ON RIGHT: ICD-10-CM

## 2025-07-08 PROBLEM — G56.03 CARPAL TUNNEL SYNDROME, BILATERAL: Status: ACTIVE | Noted: 2025-07-08

## 2025-07-08 PROCEDURE — 76881 US COMPL JOINT R-T W/IMG: CPT | Performed by: PSYCHIATRY & NEUROLOGY

## 2025-07-08 NOTE — PROGRESS NOTES
Performed by: Kd Kapadia MD  Authorized by: Kd Kapadia MD           NEUROMUSCULAR ULTRASOUND OF THE RIGHT AND LEFT UPPER EXTREMITY    INDICATION:  Clinical Information: History of bilateral carpal tunnel release presents with bilateral upper extremities numbness/paresthesia/pain involving the ring fingers, and little fingers bilaterally that has been persistent in the last 3 months.  Also reports intermittent paresthesia involving the lateral 3 digits bilaterally, and radicular type pain involving the bilateral neck radiating distally to the elbow level.  Evaluate for bilateral median, and ulnar neuropathies.    Neuromuscular ultrasound to be performed:  (a) to evaluate the echotexture and size of the right and left median nerves in the limb with attention to the carpal tunnel;  (b) to assess for any identifiable structural source of median nerve compression;   (c) to assess adjacent structures around the median nerve for abnormalities.   (d) to assess the wrist joints for abnormalities  (e) to evaluate the echotexture and size of the right and ulnar nerves throughout its course in the limb;   (f) to assess for any identifiable mechanical source of ulnar nerve compression;   (g) to identify any dynamic source of neuropathy from nerve subluxation or dislocation;  (h) to assess adjacent structures around the elbows for abnormalities    HEIGHT: 5 ft 4 in  WEIGHT: 221 lbs.  HANDEDNESS: Right    COMPARISON:  None.    TECHNIQUE:  The bilateral median nerves and accompanying structures were studied throughout their entire anatomic course in the limb from the wrist to the axilla, including real-time cine imaging. The examination was performed using a Domee P9 ultrasound machine with a 15-6 MHz matrix linear transducer. Both axial and longitudinal views were obtained. The patient was examined supine with the arm extended and supinated. Cross sectional area (CSA) was measured within the epineurium, using the trace  method. At locations where repeat measurements were taken, the mean value is reported below. Transverse and longitudinal images were obtained. The right and left ulnar nerves and accompanying structures were studied throughout their entire anatomic course in the limb from the wrist to the axilla, including real-time cine imaging. For the ulnar nerves, the patient was placed supine with the arms externally rotated, abducted, and slightly flexed at the elbow.  With the elbow extended, the transducer was placed at the level of the medial epicondyle as the patient´s elbow was passively flexed to determine if either ulnar nerve subluxed or dislocated out of the groove.    FINDINGS:    At the right wrist at the distal wrist crease (proximal carpal tunnel), the median nerve CSA was 10.3 mm2 (NL < 10 mm2; borderline 10-13 mm2; ABN > 13 mm2).    The echogenicity of the right median nerve at the wrist was normal. The mobility of the right median nerve with flexion and extension of the fingers was normal. Color Doppler of the right median nerve showed normal median nerve vascularity.  No abnormal tenosynovitis of the right flexor tendons was noted.    Using a longitudinal scan of the right median nerve at the wrist, a notch sign was not seen under the flexor retinaculum.    A right persistent median artery was not present. A right bifid median nerve was not present. No other abnormal mass or ganglia was appreciated in the right carpal tunnel. With extension of the fingers, there was no abnormal intrusion of the right flexor digitorum sublimis. With flexion of the fingers, there was no abnormal intrusion of the right lumbrical muscles.    In the mid-forearm, approximately 12 cm proximal to the distal wrist crease, the right median nerve was seen between the flexor digitorum sublimis and flexor digitorum profundus muscles. At the mid-forearm, the right median nerve CSA was 7.1 mm2. The ratio of the right median CSAs between the  wrist and forearm (WFR) was 1.4 (NL < 1.5; borderline: 1.5 - 2.0). The echogenicity of the right median nerve in the forearm was normal.    The right median nerve was then followed proximal into the forearm and then to the axilla. The median nerve was normal in size and echogenicity adjacent to the brachial artery.     Scanning the muscles, the echogenicity was normal of the flexor digitorum sublimis, flexor digitorum profundus, flexor pollicis longus, flexor carpi radialis, and pronator teres. The echogenicity of the abductor pollicis brevis was normal.    At the right wrist joint, the radial carpal joint was normal. No abnormal effusion was seen. The flexor carpi radialis tendon was normal. Both the radial and ulnar arteries were well seen and were normal.    Attention was then turned to the ulnar nerve. At the wrist, the ulnar CSA was 6.5 mm2 (NL < 10 mm2). The echogenicity was normal.    At the mid-forearm slightly proximal to the location where the ulnar artery  from the ulnar nerve, the CSA was 8.1 mm2 (NL < 10 mm2). The echogenicity was normal.    At the level of cubital tunnel, the ulnar nerve with the largest CSA was located in between the two heads of the flexor carpi ulnaris. The CSA at this location was 7.9 mm2 (NL < 10 mm2; mild ABN 10-15 mm2; moderate ABN 15-20 mm2; severely ABN > 20 mm2). The echogenicity was normal.    At the level of retrocondylar groove, the ulnar nerve with the largest CSA was located between the medial epicondyle and olecranon. The CSA at this location was 5.6 mm2 (NL < 10 mm2; mild ABN 10-15 mm2; moderate ABN 15-20 mm2; severely ABN > 20 mm2). The echogenicity was normal.    No abnormal mass was appreciated affecting the ulnar nerve in the elbow. An anconeus epitrochlearis muscle was not appreciated at the elbow.     At the mid-arm under the fascia of the medial triceps, the CSA was 10.3 mm2 (NL < 10 mm2). The echogenicity was normal. The ulnar nerve was the followed to  the axilla and was normal.    The ratio of the largest CSAs between the elbow and mid-forearm was 0.7 (NL < 1.5).    The ratio of the largest CSAs between the elbow and mid-arm was 0.5 (NL < 1.5).    Scanning the right ulnar muscles, the echogenicity was normal in the deep head of the flexor pollicis brevis, medial flexor digitorum profundus and flexor carpi ulnaris.    At the left wrist at the distal wrist crease (proximal carpal tunnel), the median nerve CSA was 14.7 mm2 (NL < 10 mm2; borderline 10-13 mm2; ABN > 13 mm2).    The flattening ratio of the left median nerve (ratio of width / height of median nerve in the short- axis view) was 4.7 (NL < 3).    The echogenicity of the left median nerve at the wrist was reduced. The mobility of the left median nerve with flexion and extension of the fingers was normal. Color Doppler of the left median nerve showed normal median nerve vascularity.  No abnormal tenosynovitis of the left flexor tendons was noted].    Using a longitudinal scan of the left median nerve at the wrist, a notch sign was not seen under the flexor retinaculum.    A left persistent median artery was not present. A left bifid median nerve was not present. No other abnormal mass or ganglia was appreciated in the left carpal tunnel. With extension of the fingers, there was no abnormal intrusion of the left flexor digitorum sublimis. With flexion of the fingers, there was no abnormal intrusion of the left lumbrical muscles.    In the mid-forearm, approximately 12 cm proximal to the distal wrist crease, the left median nerve was seen between the flexor digitorum sublimis and flexor digitorum profundus muscles. At the mid-forearm, the left median nerve CSA was 7.1 mm2. The ratio of the left median CSAs between the wrist and forearm (WFR) was 2.1 (NL < 1.5; borderline: 1.5 - 2.0). The echogenicity of the left median nerve in the forearm was normal.    The left median nerve was then followed proximal into  the forearm and then to the axilla. The median nerve was normal in size and echogenicity adjacent to the brachial artery.     Scanning the muscles, the echogenicity was normal of the flexor digitorum sublimis, flexor digitorum profundus, flexor pollicis longus, flexor carpi radialis, and pronator teres. The echogenicity of the abductor pollicis brevis was normal.    At the left wrist joint, the radial carpal joint was normal. No abnormal effusion was seen. The flexor carpi radialis tendon was normal. Both the radial and ulnar arteries were well seen and were normal.    Attention was then turned to the ulnar nerve. At the wrist, the ulnar CSA was 7.2 mm2 (NL < 10 mm2). The echogenicity was normal.    At the mid-forearm slightly proximal to the location where the ulnar artery  from the ulnar nerve, the CSA was 7.8 mm2 (NL < 10 mm2). The echogenicity was normal.    At the level of cubital tunnel, the ulnar nerve with the largest CSA was located in between the two heads of the flexor carpi ulnaris. The CSA at this location was 6.8 mm2 (NL < 10 mm2; mild ABN 10-15 mm2; moderate ABN 15-20 mm2; severely ABN > 20 mm2). The echogenicity was normal.    At the level of retrocondylar groove, the ulnar nerve with the largest CSA was located between the medial epicondyle and olecranon. The CSA at this location was 6.2 mm2 (NL < 10 mm2; mild ABN 10-15 mm2; moderate ABN 15-20 mm2; severely ABN > 20 mm2). The echogenicity was normal.    No abnormal mass was appreciated affecting the ulnar nerve in the elbow. An anconeus epitrochlearis muscle was not appreciated at the elbow.     At the mid-arm under the fascia of the medial triceps, the CSA was 7.8 mm2 (NL < 10 mm2). The echogenicity was normal. The ulnar nerve was the followed to the axilla and was normal.    The ratio of the largest CSAs between the elbow and mid-forearm was 0.9 (NL < 1.5).    The ratio of the largest CSAs between the elbow and mid-arm was 0.9 (NL <  "1.5).    Scanning the left ulnar muscles, the echogenicity was normal in the deep head of the flexor pollicis brevis, medial flexor digitorum profundus and flexor carpi ulnaris.    IMPRESSION:    This is an essentially normal neuromuscular ultrasound examination of the right and left upper extremities.     There was no ultrasound evidence of median neuropathy at the right wrist by absolute criteria. In addition, the right median nerve was followed through its anatomic course in the limb from wrist to axilla and was normal.    There was no ultrasound evidence of ulnar neuropathy at the right elbow by absolute criteria. The right ulnar nerve was followed through its anatomic course in the limb from wrist to axilla and was normal.    The other visualized osseous, ligamentous, joint and tendinous structures on the right appeared normal.    There was ultrasound evidence of a mild median neuropathy at the left wrist. Please note, proper interpretation of this finding is more difficult since the patient has undergone carpal tunnel release surgery. Following carpal tunnel release surgery, the size and echogenicity of the median nerve improves, but may remain abnormal. There is no study prior to the carpal tunnel release surgery for comparison. The ultrasound finding reported to be most specific of ongoing or recurrent compression is that of a \"notch sign\". There was no evidence of a notch sign to suggest recurrent nerve impingement.  In addition, the left median nerve was followed through its anatomic course in the limb from wrist to axilla and was normal above the wrist.     There was no ultrasound evidence of ulnar neuropathy at the left elbow. The left ulnar nerve was followed through its anatomic course in the limb from wrist to axilla and was normal.    The other visualized osseous, ligamentous, joint and tendinous structures on the left appeared normal.    "

## 2025-07-10 ENCOUNTER — TELEPHONE (OUTPATIENT)
Dept: CARDIOLOGY | Facility: CLINIC | Age: 50
End: 2025-07-10
Payer: COMMERCIAL

## 2025-07-10 NOTE — TELEPHONE ENCOUNTER
Patient called and stated she had to cancel the CT angio ordered by Dr. Hudson because it was not approved. She is wondering what the next step would be. Will route to Dr. Hudson's nurse.

## 2025-07-10 NOTE — TELEPHONE ENCOUNTER
Received communication via secure chat from -Malathi Hanson MA- that testing was approved.     She will communicate with patient.

## 2025-07-14 ENCOUNTER — APPOINTMENT (OUTPATIENT)
Dept: CARDIOLOGY | Facility: CLINIC | Age: 50
End: 2025-07-14
Payer: COMMERCIAL

## 2025-07-14 ENCOUNTER — HOSPITAL ENCOUNTER (OUTPATIENT)
Dept: RADIOLOGY | Facility: HOSPITAL | Age: 50
End: 2025-07-14
Payer: COMMERCIAL

## 2025-07-18 ENCOUNTER — HOSPITAL ENCOUNTER (OUTPATIENT)
Dept: RADIOLOGY | Facility: CLINIC | Age: 50
End: 2025-07-18
Payer: COMMERCIAL

## 2025-07-28 ENCOUNTER — APPOINTMENT (OUTPATIENT)
Dept: CARDIOLOGY | Facility: CLINIC | Age: 50
End: 2025-07-28
Payer: COMMERCIAL

## 2025-07-28 VITALS
HEART RATE: 67 BPM | WEIGHT: 223.8 LBS | BODY MASS INDEX: 38.21 KG/M2 | SYSTOLIC BLOOD PRESSURE: 110 MMHG | HEIGHT: 64 IN | DIASTOLIC BLOOD PRESSURE: 60 MMHG

## 2025-07-28 DIAGNOSIS — I70.213 ATHEROSCLEROSIS OF NATIVE ARTERY OF BOTH LOWER EXTREMITIES WITH INTERMITTENT CLAUDICATION: ICD-10-CM

## 2025-07-28 DIAGNOSIS — I73.9 INTERMITTENT CLAUDICATION: ICD-10-CM

## 2025-07-28 DIAGNOSIS — E78.5 HYPERLIPIDEMIA LDL GOAL <100: ICD-10-CM

## 2025-07-28 DIAGNOSIS — I10 HTN (HYPERTENSION), BENIGN: ICD-10-CM

## 2025-07-28 DIAGNOSIS — F17.200 CURRENT EVERY DAY SMOKER: ICD-10-CM

## 2025-07-28 DIAGNOSIS — I70.8 RIGHT SUBCLAVIAN ARTERY OCCLUSION: Primary | ICD-10-CM

## 2025-07-28 DIAGNOSIS — F19.10 DRUG ABUSE: ICD-10-CM

## 2025-07-28 DIAGNOSIS — F10.11 H/O ETOH ABUSE: ICD-10-CM

## 2025-07-28 DIAGNOSIS — I74.09 AORTOILIAC OCCLUSIVE DISEASE (MULTI): ICD-10-CM

## 2025-07-28 PROBLEM — E78.00 HIGH CHOLESTEROL: Chronic | Status: RESOLVED | Noted: 2023-12-13 | Resolved: 2025-07-28

## 2025-07-28 PROCEDURE — 93000 ELECTROCARDIOGRAM COMPLETE: CPT | Performed by: STUDENT IN AN ORGANIZED HEALTH CARE EDUCATION/TRAINING PROGRAM

## 2025-07-28 RX ORDER — INSULIN GLARGINE 100 [IU]/ML
INJECTION, SOLUTION SUBCUTANEOUS
COMMUNITY
Start: 2025-07-23

## 2025-07-28 RX ORDER — BUPROPION HYDROCHLORIDE 150 MG/1
TABLET, EXTENDED RELEASE ORAL
Qty: 60 TABLET | Refills: 2 | Status: SHIPPED | OUTPATIENT
Start: 2025-07-28

## 2025-07-28 NOTE — PATIENT INSTRUCTIONS
Patient to follow up in 3 months with Dr. Kuldeep Matthew MD [facility]     Encouraged to quit smoking- heart healthy education given today.   Please START Wellbutrin 150mg once daily for first 3 days, then increase to twice daily thereafter.   Total therapy for 12 weeks to help with smoking cessation    Will arrange Vascular PVR in near future as well     Office will refer you to our  Smoking Cessation Program if you are interested.     No other changes today.   Continue same medications and treatments.   Patient educated on proper medication use.   Patient educated on risk factor modification.   Please bring any lab results from other providers / physicians to your next appointment.     Please bring all medicines, vitamins, and herbal supplements with you when you come to the office.     Prescriptions will not be filled unless you are compliant with your follow up appointments or have a follow up appointment scheduled as per instruction of your physician. Refills should be requested at the time of your visit.    Vickey GALLEGO RN am scribing for and in the presence of Dr. Kuldeep Matthew MD [facility]

## 2025-07-28 NOTE — PROGRESS NOTES
Referred by Alfredito Stokes *  Chief complaint:   Chief Complaint   Patient presents with    New Patient Visit     Patient is present to establish care . Patient was referred by  for Right subclavian artery occlusion and PAD.           History of Present Illness  Ruth Marin is a 49 y.o. year old female patient with history of hypertension, hyperlipidemia, diabetes, smoking, sinus node dysfunction status post pacemaker who is presenting for evaluation of peripheral artery disease.  Patient has a history of PAD with known aortoiliac occlusion, previously followed at [facility].  Previously was asymptomatic but now reporting claudication symptoms.  Denies rest pain or foot wounds.  Reports she is limited in her ambulation, very limited in her regular activities due to claudication. States she is able to walk about 100 feet before limiting leg discomfort.       Social History[1]    Outpatient Medications:  Current Outpatient Medications   Medication Instructions    albuterol (Ventolin HFA) 90 mcg/actuation inhaler 2 puffs, Every 6 hours PRN    aspirin 81 mg, Daily    atorvastatin (LIPITOR) 80 mg, oral, Nightly    blood sugar diagnostic (OneTouch Verio test strips) strip Test blood sugars 4 times a day as directed    brivaracetam (BRIVIACT) 200 mg, oral, 2 times daily    cholecalciferol (VITAMIN D-3) 2,000 Units, Daily    clotrimazole (Lotrimin) 1 % cream 1 Application, As needed    diclofenac sodium (Voltaren) 1 % gel gel APPLY 4 INCHES OF MEDICATION TO EACH KNEE EVERY 4 HOURS AS NEEDED    diphenhydrAMINE (BENADryl) 50 mg tablet Take 1 tablet the morning of the CTA    ezetimibe (ZETIA) 10 mg, oral, Daily    fenofibrate (TRICOR) 145 mg, Daily    gabapentin (Neurontin) 600 mg tablet 2 tablets, 3 times daily (0900,1400,1900)    ibuprofen 800 mg, oral, Every 8 hours PRN    lamoTRIgine (LAMICTAL) 200 mg, Daily    lancets (OneTouch Delica Plus Lancet) 30 gauge misc Check BG    Celestine Stanley  U-100 Insulin 100 unit/mL (3 mL) pen INJECT 20 UNITS UNDER THE SKIN EVERY DAY AT BEDTIME    lubiprostone (AMITIZA) 24 mcg, 2 times daily PRN    metoprolol tartrate (LOPRESSOR) 25 mg, oral, 2 times daily    montelukast (SINGULAIR) 10 mg, Nightly    nitroglycerin (NITROSTAT) 0.4 mg, sublingual, Every 5 min PRN, May repeat dose every 5 minutes for up to 3 doses total.    ondansetron (ZOFRAN) 8 mg, Every 8 hours PRN    pantoprazole (ProtoNix) 40 mg EC tablet 1 tablet, 2 times daily    predniSONE (Deltasone) 20 mg tablet Take 3 tablets the pm prior to CTA and 3 tablets the morning of the CTA    sucralfate (CARAFATE) 1 g, 4 times daily before meals and nightly    topiramate (TOPAMAX) 100 mg, oral, 2 times daily    Trulicity 3 mg, subcutaneous, Weekly    umeclidinium-vilanteroL (Anoro Ellipta) 62.5-25 mcg/actuation blister with device 1 puff, Daily    valACYclovir (VALTREX) 500 mg, Daily         Vitals:  Vitals:    07/28/25 0823   BP: 110/60   Pulse: 67       Physical Exam:  General: NAD, well-appearing  HEENT: moist mucous membranes, no jaundice  Neck: No JVD, no carotid bruit  Lungs: CTA brandon, no wheezing or rales  Cardiac: RRR, no murmurs  Abdomen: soft, non-tender, non-distended  Extremities: 2+ radial pulses,   Skin: warm, dry, no wound  Neurologic: AAOx3,  no focal deficits       Reviewed Study(s):    Carotid Duplex 6/20/25:  Diffuse atheromatous plaque, with less than 50% stenosis bilaterally  by ICA/CCA ratio.    Echo 6/25/24:   1. The left ventricular systolic function is normal, with a visually estimated ejection fraction of 55-60%.   2. There is normal right ventricular global systolic function.   3. There is no evidence of mitral valve stenosis.   4. No evidence of mitral valve regurgitation.   5. Trace tricuspid regurgitation is visualized.   6. Aortic valve stenosis is not present.   7. The main pulmonary artery is normal in size, and position, with normal bifurcation into the left and right pulmonary  arteries.    CTA Abd/Pelvis 3/7/24 ([facility])  Occlusion of the abdominal aorta inferior to the origin of the inferior   mesenteric artery. The occlusion extends into both common iliac arteries.   There is reconstitution filling of both external iliac arteries. Findings   are unchanged on direct comparison to the prior study     Assessment/Plan   Diagnoses and all orders for this visit:  Right subclavian artery occlusion  -     ECG 12 lead (Clinic Performed)  Hyperlipidemia LDL goal <100  HTN (hypertension), benign  Aortoiliac occlusive disease (Multi)  Atherosclerosis of native artery of both lower extremities with intermittent claudication  BMI 38.0-38.9,adult  Drug abuse  H/O ETOH abuse  Current every day smoker  Other orders  -     Referral to Vascular Medicine      #AortoIliac Occlusive Disease  #Right Subclavian Artery Stenosis  #Hyperlipidemia  #Intermittent Claudication  #Tobacco Abuse   -general cardiology has been extensively working on precert for updated CTA Abd/Pelvis. Will await those images to review.   -Discussed smoking cessation in detail, patient has tried Chantix and Nicotine Patches and Wellbutrin in past with out success. Discussed referral to Smoking Cessation program- agreeable to consider   -Will start Wellbutrin 150mg twice daily, goal 12 week therapy for smoking cessation  -Will update VELIA    RTC 3 months     IVickey RN   am scribing for, and in the presence of Dr. Kuldeep Matthew MD [facility] .    I, Kuldeep Matthew MD [facility] , personally performed the services described in the documentation as scribed by iVckey Murray RN   in my presence, and confirm it is both accurate and complete.       Kuldeep Matthew MD [facility]  Interventional Cardiology  Endovascular Interventions  [email or web address]    **Disclaimer: This note was dictated by speech recognition, and every effort has been made to prevent any error in transcription, however  minor errors may be present**             [1]   Social History  Tobacco Use    Smoking status: Every Day     Current packs/day: 0.50     Types: Cigarettes, Cigars    Smokeless tobacco: Never   Vaping Use    Vaping status: Never Used   Substance Use Topics    Alcohol use: Not Currently     Comment: quit 2008    Drug use: Yes     Types: Marijuana     Comment: 3 times per week

## 2025-07-30 ENCOUNTER — HOSPITAL ENCOUNTER (OUTPATIENT)
Dept: CARDIOLOGY | Facility: HOSPITAL | Age: 50
Discharge: HOME | End: 2025-07-30
Payer: COMMERCIAL

## 2025-07-30 DIAGNOSIS — Z95.0 CARDIAC PACEMAKER IN SITU: ICD-10-CM

## 2025-07-30 DIAGNOSIS — I49.5 SINUS NODE DYSFUNCTION (MULTI): ICD-10-CM

## 2025-07-30 PROCEDURE — 93296 REM INTERROG EVL PM/IDS: CPT

## 2025-07-30 PROCEDURE — 93294 REM INTERROG EVL PM/LDLS PM: CPT | Performed by: INTERNAL MEDICINE

## 2025-07-31 ENCOUNTER — TELEPHONE (OUTPATIENT)
Dept: CARDIOLOGY | Facility: CLINIC | Age: 50
End: 2025-07-31
Payer: COMMERCIAL

## 2025-07-31 NOTE — TELEPHONE ENCOUNTER
Patient called and l/m that she was trying to schedule her CTA and they were insisting it needed to be done in [place] due to a contrast allergy.  She stated she doesn't have the means to go to [place] to get the CTA and in the past she has always gotten it locally.  She asked in the voicemail that we speak to Riya MCGRATH for Dr. Alfredito Hudson MD. I spoke with her and she explained that it is a [facility] policy anyone with a contrast allergy needs to have a physician present at the time of the testing in case of an emergency.  I tried to call patient back to explain this, however, there was no answer and I left a voicemail asking she call back so we can let her know.

## 2025-08-07 DIAGNOSIS — I74.09 AORTOILIAC OCCLUSIVE DISEASE (MULTI): ICD-10-CM

## 2025-08-07 RX ORDER — PREDNISONE 20 MG/1
TABLET ORAL
Qty: 6 TABLET | Refills: 0 | Status: SHIPPED | OUTPATIENT
Start: 2025-08-07

## 2025-08-07 NOTE — TELEPHONE ENCOUNTER
Per note 5/3025, Dr. Alfredito Hudson prescribed Prednisone 60 mg the ppm prior and am of testing along with Benadryl 50 mg the morning of.  Rx sent to [facility] as requested.

## 2025-08-07 NOTE — TELEPHONE ENCOUNTER
Patient called stating she is scheduled for a CT angio and she needs premedication due to her allergy to contrast. Preferred pharmacy is [facility] in [place]. Will route to Dr. Hudson's nurse to address.

## 2025-08-15 DIAGNOSIS — I70.213 ATHEROSCLEROSIS OF NATIVE ARTERY OF BOTH LOWER EXTREMITIES WITH INTERMITTENT CLAUDICATION: ICD-10-CM

## 2025-08-15 DIAGNOSIS — E78.5 HYPERLIPIDEMIA LDL GOAL <100: ICD-10-CM

## 2025-08-15 RX ORDER — EZETIMIBE 10 MG/1
10 TABLET ORAL DAILY
Qty: 90 TABLET | Refills: 3 | Status: SHIPPED | OUTPATIENT
Start: 2025-08-15

## 2025-08-19 ENCOUNTER — APPOINTMENT (OUTPATIENT)
Facility: CLINIC | Age: 50
End: 2025-08-19
Payer: COMMERCIAL

## 2025-08-19 LAB — NON-UH HIE BLOOD UREA NITROGEN: 23 MG/DL (ref 7–25)

## 2025-08-27 ENCOUNTER — TELEPHONE (OUTPATIENT)
Dept: CARDIOLOGY | Facility: CLINIC | Age: 50
End: 2025-08-27
Payer: COMMERCIAL

## 2025-08-28 ENCOUNTER — HOSPITAL ENCOUNTER (OUTPATIENT)
Dept: RADIOLOGY | Facility: HOSPITAL | Age: 50
Discharge: HOME | End: 2025-08-28
Payer: COMMERCIAL

## 2025-09-03 ENCOUNTER — APPOINTMENT (OUTPATIENT)
Dept: NEUROLOGY | Facility: CLINIC | Age: 50
End: 2025-09-03
Payer: COMMERCIAL

## 2025-09-03 ENCOUNTER — HOSPITAL ENCOUNTER (OUTPATIENT)
Dept: RADIOLOGY | Facility: HOSPITAL | Age: 50
Discharge: HOME | End: 2025-09-03
Payer: COMMERCIAL

## 2025-09-03 DIAGNOSIS — I74.09 AORTOILIAC OCCLUSIVE DISEASE (MULTI): ICD-10-CM

## 2025-09-03 DIAGNOSIS — I70.213 ATHEROSCLEROSIS OF NATIVE ARTERY OF BOTH LOWER EXTREMITIES WITH INTERMITTENT CLAUDICATION: ICD-10-CM

## 2025-09-03 DIAGNOSIS — I73.9 INTERMITTENT CLAUDICATION: ICD-10-CM

## 2025-09-03 PROCEDURE — 93922 UPR/L XTREMITY ART 2 LEVELS: CPT | Performed by: SURGERY

## 2025-09-03 PROCEDURE — 93922 UPR/L XTREMITY ART 2 LEVELS: CPT

## 2025-09-03 RX ORDER — ACETAMINOPHEN 500 MG
50 TABLET ORAL
Qty: 90 CAPSULE | Refills: 3 | OUTPATIENT
Start: 2025-09-03

## 2025-09-03 ASSESSMENT — ENCOUNTER SYMPTOMS
FATIGUE: 0
PHOTOPHOBIA: 0
WEAKNESS: 0
LIGHT-HEADEDNESS: 0
HALLUCINATIONS: 0
UNEXPECTED WEIGHT CHANGE: 0
NECK PAIN: 0
DIFFICULTY URINATING: 0
OCCASIONAL FEELINGS OF UNSTEADINESS: 1
FREQUENCY: 0
VOMITING: 0
ARTHRALGIAS: 1
NECK STIFFNESS: 0
FACIAL ASYMMETRY: 0
SPEECH DIFFICULTY: 0
WHEEZING: 0
DEPRESSION: 0
TREMORS: 0
EYE PAIN: 0
DIZZINESS: 0
ABDOMINAL PAIN: 0
JOINT SWELLING: 0
AGITATION: 0
PALPITATIONS: 0
LOSS OF SENSATION IN FEET: 0
TROUBLE SWALLOWING: 0
BRUISES/BLEEDS EASILY: 0
COUGH: 0
FEVER: 0
HEADACHES: 0
SINUS PRESSURE: 0
NUMBNESS: 0
SEIZURES: 1
BACK PAIN: 0
HYPERACTIVE: 0
SLEEP DISTURBANCE: 0
ADENOPATHY: 0
CONFUSION: 0
SHORTNESS OF BREATH: 0
NAUSEA: 0

## 2025-09-03 ASSESSMENT — PATIENT HEALTH QUESTIONNAIRE - PHQ9
SUM OF ALL RESPONSES TO PHQ9 QUESTIONS 1 & 2: 0
1. LITTLE INTEREST OR PLEASURE IN DOING THINGS: NOT AT ALL
2. FEELING DOWN, DEPRESSED OR HOPELESS: NOT AT ALL

## 2025-09-04 LAB
NON-UH HIE CREATININE: 0.8 MG/DL (ref 0.6–1.2)
NON-UH HIE ESTIMATED GFR: >60

## 2025-09-25 ENCOUNTER — APPOINTMENT (OUTPATIENT)
Facility: CLINIC | Age: 50
End: 2025-09-25
Payer: COMMERCIAL

## 2025-10-20 ENCOUNTER — APPOINTMENT (OUTPATIENT)
Dept: CARDIOLOGY | Facility: CLINIC | Age: 50
End: 2025-10-20
Payer: COMMERCIAL

## 2025-10-23 ENCOUNTER — APPOINTMENT (OUTPATIENT)
Dept: CARDIOLOGY | Facility: CLINIC | Age: 50
End: 2025-10-23
Payer: COMMERCIAL

## 2026-05-20 ENCOUNTER — APPOINTMENT (OUTPATIENT)
Dept: CARDIOLOGY | Facility: CLINIC | Age: 51
End: 2026-05-20
Payer: COMMERCIAL

## (undated) DEVICE — INTRODUCER SYSTEM, PRELUDE SNAP, SPLITTABLE, 9 FR X 13 CM

## (undated) DEVICE — INTRODUCER SYSTEM, PRELUDE SNAP, SPLITTABLE, HEMOSTATIC, 7FR

## (undated) DEVICE — DRESSING, AQUACEL AG, HYDROFIBER W/SILVER, 3.5 X 6 IN

## (undated) DEVICE — 3.6 X 4 (9X10CM) MEPILEX BORDER POST-OP AG ANTIMICROBIAL DRESSING "

## (undated) DEVICE — SHIELD, PERIPHERAL, SCATPAD, 500 SERIES, 34 X 11

## (undated) DEVICE — HEMOSTAT, ARISTA, ABSORBABLE, 3 GRAMS